# Patient Record
Sex: FEMALE | Race: WHITE | HISPANIC OR LATINO | Employment: FULL TIME | URBAN - METROPOLITAN AREA
[De-identification: names, ages, dates, MRNs, and addresses within clinical notes are randomized per-mention and may not be internally consistent; named-entity substitution may affect disease eponyms.]

---

## 2018-03-03 LAB — EXTERNAL HIV SCREEN: NORMAL

## 2019-11-22 RX ORDER — NORGESTIMATE AND ETHINYL ESTRADIOL 0.25-0.035
KIT ORAL EVERY 24 HOURS
COMMUNITY
Start: 2017-03-24 | End: 2019-11-25

## 2019-11-25 ENCOUNTER — OFFICE VISIT (OUTPATIENT)
Dept: INTERNAL MEDICINE CLINIC | Facility: CLINIC | Age: 44
End: 2019-11-25

## 2019-11-25 VITALS
DIASTOLIC BLOOD PRESSURE: 88 MMHG | OXYGEN SATURATION: 99 % | TEMPERATURE: 98.1 F | BODY MASS INDEX: 26.37 KG/M2 | HEIGHT: 65 IN | HEART RATE: 80 BPM | SYSTOLIC BLOOD PRESSURE: 110 MMHG | WEIGHT: 158.29 LBS

## 2019-11-25 DIAGNOSIS — Z01.419 ROUTINE GYNECOLOGICAL EXAMINATION: ICD-10-CM

## 2019-11-25 DIAGNOSIS — Z13.220 SCREENING, LIPID: ICD-10-CM

## 2019-11-25 DIAGNOSIS — D64.9 ANEMIA, UNSPECIFIED TYPE: ICD-10-CM

## 2019-11-25 DIAGNOSIS — Z00.00 ROUTINE ADULT HEALTH MAINTENANCE: Primary | ICD-10-CM

## 2019-11-25 DIAGNOSIS — M43.6 NECK STIFFNESS: ICD-10-CM

## 2019-11-25 DIAGNOSIS — Z12.39 SCREENING FOR BREAST CANCER: ICD-10-CM

## 2019-11-25 DIAGNOSIS — E66.3 OVERWEIGHT (BMI 25.0-29.9): ICD-10-CM

## 2019-11-25 DIAGNOSIS — Z13.1 SCREENING FOR DIABETES MELLITUS: ICD-10-CM

## 2019-11-25 DIAGNOSIS — Z11.4 SCREENING FOR HIV (HUMAN IMMUNODEFICIENCY VIRUS): ICD-10-CM

## 2019-11-25 PROBLEM — D50.9 IRON DEFICIENCY ANEMIA: Status: ACTIVE | Noted: 2017-03-25

## 2019-11-25 PROCEDURE — 99386 PREV VISIT NEW AGE 40-64: CPT | Performed by: PHYSICIAN ASSISTANT

## 2019-11-25 RX ORDER — CYCLOBENZAPRINE HCL 10 MG
10 TABLET ORAL
Qty: 14 TABLET | Refills: 0 | Status: SHIPPED | OUTPATIENT
Start: 2019-11-25 | End: 2019-12-04

## 2019-11-25 RX ORDER — MELOXICAM 15 MG/1
15 TABLET ORAL DAILY
Qty: 14 TABLET | Refills: 0 | Status: SHIPPED | OUTPATIENT
Start: 2019-11-25 | End: 2020-09-14

## 2019-11-25 NOTE — PATIENT INSTRUCTIONS
Dieta baja en grasa   CUIDADO AMBULATORIO:   Kristen dieta baja en grasa  es un plan alimenticio con un bajo contenido de grasa total, de grasa no saludable y de colesterol  Es posible que deba seguir kristen dieta baja en grasas si tiene dificultad para digerir o absorber las grasas  Puede también que deba seguir robert tipo de dieta si tiene colesterol alto  [place] que consume para bajar fox nivel de colesterol  La fibra soluble es un tipo de fibra que ayuda a bajar el nivel de [place]  Distintos tipos de grasa que contienen los alimentos:   · Limite las grasas no saludables  [place]ra [place] con un alto contenido de colesterol, grasas saturadas y grasas trans puede hacer que fox colesterol suba a un nivel no saludable  El riesgo de tener kristen enfermedad cardíaca aumenta cuando el nivel de colesterol no se encuentra dentro de un margen saludable  ¨ Colesterol:  Limite el consumo de colesterol a menos de 200 mg al día  El colesterol se encuentra en las vasquez, los huevos y productos lácteos  ¨ Grasas saturadas:  Limite el consumo de grasas saturadas a menos del 7% del total de bridgett calorías diarias  Pregunte a fox dietista cuántas calorías necesita por día  Las grasas saturadas se encuentran en la [facility] york, el [facility], el helado, la Judy Vasquez entera y el aceite de cespedes  Las vasquez, harinder la carne de res, de cerdo, la piel de ynes y las vasquez procesadas, también contienen grasas saturadas  Las vasquez procesadas [place], los embutidos y la mortadela  ¨ Grasas trans:  Evite el consumo de grasas trans siempre que pueda  Las grasas trans se usan en los alimentos fritos y de [place]  Pese a que algunos alimentos dicen no tener grasas trans en el paquete, pueden contener hasta 0 5 gramos de grasas trans por porción  · Incluya grasas saludables    Sustituya los [facility] tienen un alto contenido de grasas saturadas y grasas trans por alimentos que tienen un alto contenido de grasas saludables  [place] puede ayudar a [employer] de [facility]  ¨ Grasas monoinsaturadas:  Se encuentran en los [address], las nueces y los [address] [place] [address] Center , harinder el aceite de [place], cancaterina y Dorothy Chaudhry poliinsaturadas: Se pueden encontrar en los [address] [place] [address] , harinder el aceite de soya o de [place]  Las grasas Omega 3 pueden contribuir a disminuir el riesgo de padecer enfermedades cardíacas  Las [place] 3 se [employer] pescados, harinder el salmón, el arenque, la trucha y [place] breeze  Las grasas Omega 3 también se encuentran en los alimentos que provienen de las plantas, harinder las nueces, la linaza, la soya y el aceite de canola o colza  Alimentos que debe limitar o evitar:   · Granos:      ¨ Meriendas elaboradas con aceites parcialmente hidrogenados, harinder papitas fritas, galletas saladas regulares y palomitas de maíz con sabor a mantequilla  ¨ Productos de panadería con un alto contenido de grasa, harinder [facility], [place], donas, tartas, galletas y pasteles      · Productos lácteos:      ¨ Leche entera, leche con 2% de [place], y yogur y helado elaborados con leche entera    ¨ Crema para el café, crema doble y crema batida    ¨ [place], [place] crema y crema agria    · Rebeccaside y proteínas:      ¨ Garcia de carne con un alto contenido de grasa (harinder [place], carne molida regular y costillas)    ¨ Carne de res, de ave (pavo y ynes) o de pescado frita    ¨ Vasquez xin (ynes y pavo) con piel    ¨ Fiambres (vickie o mortadela [facility]), perritos calientes, tocino y salchichas    ¨ Huevos enteros y yemas de [place]    · Verduras y frutas con grasa agregada:      Cory dasilva, con mantequilla o con salsas con un alto contenido de Wendy ramirez, harinder salsas de crema o de queso    ¨ Fruta frita o servida con mantequilla o crema    · Grasas:      sara Weller en [facility] y Kathy    ¨ Aceite de eduardo, de cespedes y de [place] de cespedes  Alimentos que puede incluir: · Granos:      ¨ Panes, cereales y pastas de [place] integral y arroz integral    ¨ Galletas y roscas saladas con bajo contenido de grasa    · Verduras y frutas:      ¨ Verduras frescas, congeladas o enlatadas (sin sal o con bajo contenido de sodio)    ¨ Fruta fresca, seca o enlatada (en almíbar con poca azúcar o en jugo de fruta)    ¨ [place]    · Productos lácteos bajos en grasa:      ¨ Leche descremada (sin gordura) o con un 1% de grasa    ¨ Queso descremado o con un bajo contenido de Wendy ramirez, yogur y requesón    · Rebeccaside y proteínas:      ¨ Carne de ynes o pavo sin piel    ¨ Pescado horneado o asado    ¨ Carne de res o de cerdo [place] (lomo, carne molida extra [place])    ¨ Frijoles y chícharos, nueces sin sal y productos de soya    ¨ Ana de [place] y sustitutos de huevo    ¨ Semillas y nueces    · Grasas:      ¨ Aceites no saturados, harinder de colza o canola, de [place], de [place], soya o girasol    ¨ Margarina blanda o líquida y aceite vegetal para untar    ¨ Aderezo de ensaladas bajo en grasa  Otras formas de disminuir la cantidad de grasa en fox dieta:   · Yolie las etiquetas de los alimentos antes de comprarlos  Elija alimentos con menos de un 30% del total de calorías provenientes de la grasa  Elija productos lácteos con bajo contenido de grasa o descremados  Recuerde que el hecho de que un producto no contenga grasa no quiere decir que no tiene calorías  Estos alimentos tienen calorías y puede aumentar de peso si ingiere muchas calorías  · Recorte la grasa de las gildardo y no consuma alimentos fritos  Recorte toda la grasa visible de las gildardo antes de cocinarlas  Quite la piel de las gildardo de ave  No fría la carne, el [address] aves  Es preferible que cocine al horno, ase o hierva estos alimentos  Evite los alimentos fritos  Consuma kristen papa al horno en lugar de skylar fritas  Coma verduras al vapor en lugar de verduras salteadas en mantequilla  · Agregue menos grasa a los alimentos    Use trocitos de imitación tocino en las ensaladas y en las skylar horneadas, en lugar de tocino de evangelina  Utilice aderezos para ensaladas sin grasa o bajos en grasa en lugar de aderezos comunes  Use un aderezo con sabor a mantequilla con bajo contenido de grasa o sin grasa en lugar de mantequilla o de margarina hollie [address] y otros alimentos  Maneras de usar menos grasa en las recetas de cocina:  Sustituya los ingredientes con un alto contenido de grasa por ingredientes con un bajo contenido de grasa o sin grasa  [place] puede [employer] productos horneados queden más secos que de [place]  Es posible que deba usar aceite en aerosol sin grasa en los moldes para evitar que los alimentos se peguen  Es posible que también deba modificar la cantidad de otros ingredientes de la receta, harinder el [place]  Intente lo siguiente:  · Use margarina liviana o con bajo contenido de grasa en lugar de margarina común o manteca  · Use pechuga de pavo o carne de ynes [place], o carne de res [place] (menos del 5% de grasa), en lugar de carne para hamburguesas  · Añada 1 cucharadita de aceite de canola o colza a 8 onzas de leche descremada en lugar de usar crema  · Use calabacines, zanahorias o manzanas ralladas en los panes en lugar de eduardo     · Use requesón mezclado y bajo en grasa, tofú o queso ricota bajo en grasa en lugar de queso crema  · Use 1 dona de huevo y 1 cucharadita de aceite de canola o use ¼ taza (2 onzas) de sustituto de huevo sin grasa, en lugar de usar un huevo entero  · Al hornear, sustituya la mitad del aceite que requiere la receta por puré de Synchari  Use 3 cucharadas de cocoa en polvo y 1 cucharada de aceite de canola en lugar de un bloquecito de chocolate para hornear  Cómo aumentar la cantidad de [place] en fox dieta:  Consuma suficientes alimentos ricos en fibra con el fin de ingerir entre 20 y 27 gramos de fibra a diario   Para evitar los cólicos estomacales, los gases y [address] [address], aumente gradualmente el consumo de [place]  · Consuma 3 onzas de alimentos integrales al día  Kristen rebanada de pan pesa aproximadamente 1 onza  Coma panes integrales, harinder el pan de kirti integral  El kirti integral, la harina de kirti integral y otros granos integrales deben figurar harinder el primer ingrediente en la etiqueta del producto  Sustituya la harina lorin por [place] integral o use mitad y mitad en bridgett recetas  La harina integral es más pesada que la harina lorin, por lo cual es posible que deba agregar más levadura o polvo de hornear a rankin receta  · Consuma un cereal rico en fibra en el desayuno  La harina de tennille es kristen buena nayla de fibra soluble  Busque los cereales que tengan la palabra salvado o fibra ("bran" o "fiber") en rankin nombre  Elija productos con granos integrales, harinder el arroz integral, la cebada y las pastas elaboradas con [place] integral      · Coma más frijoles, chícharos y lentejas  Agregue, por ejemplo, frijoles a las sopas o ensaladas  Consuma al menos 5 tazas de frutas o verduras a diario  Consuma las frutas y verduras sin pelar, ya que la cáscara tiene un alto contenido de [place]  © 2017 [address] Information is for End User's use only and may not be sold, redistributed or otherwise used for commercial purposes  All illustrations and images included in CareNotes® are the copyrighted property of [employer]  or Derek Light  Esta información es sólo para uso en educación  Rankin intención no es darle un consejo médico sobre enfermedades o tratamientos  Colsulte con rankin [place] farmacéutico antes de seguir cualquier régimen médico para saber si es seguro y efectivo para usted

## 2019-11-25 NOTE — PROGRESS NOTES
Assessment/Plan:      Diagnoses and all orders for this visit:    Routine adult health maintenance    Anemia, unspecified type  -     CBC and differential; Future  -     Iron Panel (Includes Ferritin, Iron Sat%, Iron, and TIBC); Future    Overweight (BMI 25 0-29  9)    Neck stiffness  -     meloxicam (MOBIC) 15 mg tablet; Take 1 tablet (15 mg total) by mouth daily for 14 days  -     cyclobenzaprine (FLEXERIL) 10 mg tablet; Take 1 tablet (10 mg total) by mouth daily at bedtime for 14 days    Screening for breast cancer  -     Mammo screening bilateral w cad; Future    Screening for diabetes mellitus  -     Comprehensive metabolic panel; Future    Screening, lipid  -     Lipid panel; Future    Screening for HIV (human immunodeficiency virus)  -     HIV 1/2 AG-AB combo; Future    Routine gynecological examination  -     Ambulatory referral to Obstetrics / Gynecology; Future      63-year-old female presenting today to establish with our office and for annual physical   Overall she is in good health  Past medical history includes anemia and reports needing IV iron infusions secondary to oral iron GI intolerance  Will check updated CBC and iron panel  She notes that she does not have as heavy periods as she used to which could have contributed to iron deficiency  Only complaint today is some neck stiffness for 2 weeks  Appears muscular in nature  Educated patient on the importance of routine gentle neck stretches at least 15 minutes 3 times a day that were demonstrated in the office in addition to moist heat or heating pad to relax the muscles  I will have patient complete a 2 week treatment of meloxicam with food once a day in the morning, cyclobenzaprine once a day before bed with side effects discussed  Age-appropriate education regarding screenings and prevention were addressed today  Patient has a very poor dietary regimen  She was advised to avoid fast food and high fat foods/fried foods    She was encouraged to eat more fruits and vegetables as well as lean protein and avoiding excessive saturated and trans fats  Also encouraged exercise 3 times a week at least 30 minutes each  Patient was given referral to [facility] gyn as she was due for her next Pap testing in January 2019  She also is overdue for mammogram screening which was provided for her today  She is also interested in other preventative labs so will check CMP, lipids as well as HIV screening  Patient unfortunately declines flu vaccine despite recommendations  Patient to follow up in 1 year for annual physical unless needed sooner regarding test results or ongoing medical issues  Chief Complaint   Patient presents with   [facility]      Patient is here for a physical and routine blood work  Anemia       Subjective:     Patient ID: Laxmi Rm is a 40 y o  female     39y/o female here today for establishment with office and physical  Office  available to establish pt  She reports hx of anemia, otherwise no specific medical diagnosis  She does not take iron  She reports used to get IV iron infusions due to GI intolerance  She is not under tx for any medical problems at present and no medications  States hx of lump right breast and has had testing done  She is overdue for screening mammogram     Last GYN exam 1/2018  Due for PAP 1/2019  LMP: 11/9/19  Periods are once a month, uncomplicated  She does note less heavy bleeding than in past causing anemia  She does not take any oral BC  Currently sexually active monogamous with   She does not use protection  She had tubal ligation  She feels she does not eat healthy, she feels she eats a lot of junk food  She eats 2 meals a day  She eats fast food a lot as well as at restaurants  She drinks a lot of water during the day  No vitamins  ETOH - none  Tobacco - denies past or present  Denies past or present drug use      Last dental exam - 3/2019  Does not require vision correction  Lives in apartment with  and children, feels safe in home  Working smoke alarms  Pt drives, wears seatbelt  She does note some problems with neck pain for 2 weeks  Noted B/L neck into upper back and shoulder  Stiffened and sore  She denies specific injury  States woke up with pain  She works as a   She tried tylenol, no other tx at home  Review of Systems   Constitutional: Negative  HENT: Negative  Eyes: Negative  Respiratory: Negative  Cardiovascular: Negative  Gastrointestinal: Negative  Endocrine: Negative  Genitourinary: Negative  Musculoskeletal:        As in HPI   Skin: Negative  Neurological: Negative  Hematological: Negative  Psychiatric/Behavioral: Negative  The following portions of the patient's history were reviewed and updated as appropriate: allergies, current medications, past family history, past medical history, past social history, past surgical history and problem list       Objective:     Physical Exam   Constitutional: She is oriented to person, place, and time  She appears well-developed  No distress  Mild overweight BMI 26 18   HENT:   Right Ear: Hearing, tympanic membrane and ear canal normal    Left Ear: Hearing, tympanic membrane and ear canal normal    Nose: Nose normal    Mouth/Throat: Oropharynx is clear and moist    Eyes: Conjunctivae, EOM and lids are normal    Neck: Neck supple  Normal carotid pulses present  Carotid bruit is not present  Cardiovascular: Normal rate, regular rhythm, normal heart sounds and normal pulses  No LE edema   Pulmonary/Chest: Effort normal and breath sounds normal    Abdominal: Soft  Normal appearance and bowel sounds are normal  There is no tenderness  Musculoskeletal:   Normal appearance of neck and UE's  Tenderness along B/L cervical paraspinals   Decreased AROM of neck in all directions with neck stiffness, mainly noted difficulty with flexion and extension  Lymphadenopathy:        Head (right side): No submandibular and no tonsillar adenopathy present  Head (left side): No submandibular and no tonsillar adenopathy present  Neurological: She is alert and oriented to person, place, and time  She displays no tremor  No sensory deficit  She exhibits normal muscle tone  Coordination and gait normal    Reflex Scores:       Brachioradialis reflexes are 2+ on the right side and 2+ on the left side  Patellar reflexes are 2+ on the right side and 2+ on the left side  Skin: Skin is intact  Psychiatric: She has a normal mood and affect  Her speech is normal and behavior is normal    Vitals reviewed  Vitals:    11/25/19 1256   BP: 110/88   BP Location: Left arm   Patient Position: Sitting   Cuff Size: Adult   Pulse: 80   Temp: 98 1 °F (36 7 °C)   TempSrc: Oral   SpO2: 99%   Weight: 71 8 kg (158 lb 4 6 oz)   Height: 5' 5 2" (1 656 m)        BMI Counseling: Body mass index is 26 18 kg/m²  The BMI is above normal  Nutrition recommendations include decreasing overall calorie intake, 3-5 servings of fruits/vegetables daily, reducing fast food intake, consuming healthier snacks, moderation in carbohydrate intake, increasing intake of lean protein, reducing intake of saturated fat and trans fat and reducing intake of cholesterol  Exercise recommendations include exercising 3-5 times per week

## 2019-12-02 ENCOUNTER — HOSPITAL ENCOUNTER (EMERGENCY)
Facility: HOSPITAL | Age: 44
Discharge: HOME | End: 2019-12-02
Attending: EMERGENCY MEDICINE | Admitting: EMERGENCY MEDICINE

## 2019-12-02 VITALS
DIASTOLIC BLOOD PRESSURE: 65 MMHG | OXYGEN SATURATION: 100 % | HEART RATE: 79 BPM | BODY MASS INDEX: 24.66 KG/M2 | TEMPERATURE: 99.3 F | RESPIRATION RATE: 18 BRPM | HEIGHT: 65 IN | SYSTOLIC BLOOD PRESSURE: 121 MMHG | WEIGHT: 148 LBS

## 2019-12-02 DIAGNOSIS — R19.7 NAUSEA VOMITING AND DIARRHEA: Primary | ICD-10-CM

## 2019-12-02 DIAGNOSIS — R11.2 NAUSEA VOMITING AND DIARRHEA: Primary | ICD-10-CM

## 2019-12-02 DIAGNOSIS — D64.9 CHRONIC ANEMIA: ICD-10-CM

## 2019-12-02 DIAGNOSIS — E86.0 DEHYDRATION: ICD-10-CM

## 2019-12-02 LAB
ALBUMIN SERPL BCP-MCNC: 4.2 G/DL (ref 3.5–5)
ALP SERPL-CCNC: 63 U/L (ref 46–116)
ALT SERPL W P-5'-P-CCNC: 17 U/L (ref 12–78)
ANION GAP SERPL CALCULATED.3IONS-SCNC: 9 MMOL/L (ref 4–13)
AST SERPL W P-5'-P-CCNC: 13 U/L (ref 5–45)
BASOPHILS # BLD AUTO: 0.01 THOUSANDS/ΜL (ref 0–0.1)
BASOPHILS NFR BLD AUTO: 0 % (ref 0–1)
BILIRUB SERPL-MCNC: 0.8 MG/DL (ref 0.2–1)
BUN SERPL-MCNC: 7 MG/DL (ref 5–25)
CALCIUM SERPL-MCNC: 9.2 MG/DL (ref 8.3–10.1)
CHLORIDE SERPL-SCNC: 107 MMOL/L (ref 100–108)
CO2 SERPL-SCNC: 23 MMOL/L (ref 21–32)
CREAT SERPL-MCNC: 0.75 MG/DL (ref 0.6–1.3)
EOSINOPHIL # BLD AUTO: 0.01 THOUSAND/ΜL (ref 0–0.61)
EOSINOPHIL NFR BLD AUTO: 0 % (ref 0–6)
ERYTHROCYTE [DISTWIDTH] IN BLOOD BY AUTOMATED COUNT: 20.3 % (ref 11.6–15.1)
GFR SERPL CREATININE-BSD FRML MDRD: 97 ML/MIN/1.73SQ M
GLUCOSE SERPL-MCNC: 119 MG/DL (ref 65–140)
HCT VFR BLD AUTO: 29 % (ref 34.8–46.1)
HGB BLD-MCNC: 7.8 G/DL (ref 11.5–15.4)
HOLD SPECIMEN: NORMAL
IMM GRANULOCYTES # BLD AUTO: 0.02 THOUSAND/UL (ref 0–0.2)
IMM GRANULOCYTES NFR BLD AUTO: 0 % (ref 0–2)
LIPASE SERPL-CCNC: 93 U/L (ref 73–393)
LYMPHOCYTES # BLD AUTO: 0.65 THOUSANDS/ΜL (ref 0.6–4.47)
LYMPHOCYTES NFR BLD AUTO: 11 % (ref 14–44)
MCH RBC QN AUTO: 17 PG (ref 26.8–34.3)
MCHC RBC AUTO-ENTMCNC: 26.9 G/DL (ref 31.4–37.4)
MCV RBC AUTO: 63 FL (ref 82–98)
MONOCYTES # BLD AUTO: 0.61 THOUSAND/ΜL (ref 0.17–1.22)
MONOCYTES NFR BLD AUTO: 11 % (ref 4–12)
NEUTROPHILS # BLD AUTO: 4.53 THOUSANDS/ΜL (ref 1.85–7.62)
NEUTS SEG NFR BLD AUTO: 78 % (ref 43–75)
NRBC BLD AUTO-RTO: 0 /100 WBCS
PLATELET # BLD AUTO: 209 THOUSANDS/UL (ref 149–390)
PMV BLD AUTO: 9.8 FL (ref 8.9–12.7)
POTASSIUM SERPL-SCNC: 3.4 MMOL/L (ref 3.5–5.3)
PROT SERPL-MCNC: 8.2 G/DL (ref 6.4–8.2)
RBC # BLD AUTO: 4.6 MILLION/UL (ref 3.81–5.12)
SODIUM SERPL-SCNC: 139 MMOL/L (ref 136–145)
WBC # BLD AUTO: 5.83 THOUSAND/UL (ref 4.31–10.16)

## 2019-12-02 PROCEDURE — 85025 COMPLETE CBC W/AUTO DIFF WBC: CPT | Performed by: EMERGENCY MEDICINE

## 2019-12-02 PROCEDURE — 99283 EMERGENCY DEPT VISIT LOW MDM: CPT

## 2019-12-02 PROCEDURE — 96361 HYDRATE IV INFUSION ADD-ON: CPT

## 2019-12-02 PROCEDURE — 36415 COLL VENOUS BLD VENIPUNCTURE: CPT

## 2019-12-02 PROCEDURE — 99284 EMERGENCY DEPT VISIT MOD MDM: CPT | Performed by: EMERGENCY MEDICINE

## 2019-12-02 PROCEDURE — 80053 COMPREHEN METABOLIC PANEL: CPT | Performed by: EMERGENCY MEDICINE

## 2019-12-02 PROCEDURE — 83690 ASSAY OF LIPASE: CPT | Performed by: EMERGENCY MEDICINE

## 2019-12-02 PROCEDURE — 96375 TX/PRO/DX INJ NEW DRUG ADDON: CPT

## 2019-12-02 PROCEDURE — 96374 THER/PROPH/DIAG INJ IV PUSH: CPT

## 2019-12-02 RX ORDER — DICYCLOMINE HCL 20 MG
20 TABLET ORAL 2 TIMES DAILY
Qty: 20 TABLET | Refills: 0 | Status: SHIPPED | OUTPATIENT
Start: 2019-12-02 | End: 2020-09-14

## 2019-12-02 RX ORDER — METOCLOPRAMIDE 10 MG/1
10 TABLET ORAL EVERY 6 HOURS PRN
Qty: 30 TABLET | Refills: 0 | Status: SHIPPED | OUTPATIENT
Start: 2019-12-02 | End: 2020-09-14

## 2019-12-02 RX ORDER — METOCLOPRAMIDE HYDROCHLORIDE 5 MG/ML
10 INJECTION INTRAMUSCULAR; INTRAVENOUS ONCE
Status: COMPLETED | OUTPATIENT
Start: 2019-12-02 | End: 2019-12-02

## 2019-12-02 RX ORDER — KETOROLAC TROMETHAMINE 30 MG/ML
15 INJECTION, SOLUTION INTRAMUSCULAR; INTRAVENOUS ONCE
Status: COMPLETED | OUTPATIENT
Start: 2019-12-02 | End: 2019-12-02

## 2019-12-02 RX ORDER — DICYCLOMINE HCL 20 MG
20 TABLET ORAL ONCE
Status: COMPLETED | OUTPATIENT
Start: 2019-12-02 | End: 2019-12-02

## 2019-12-02 RX ADMIN — KETOROLAC TROMETHAMINE 15 MG: 30 INJECTION, SOLUTION INTRAMUSCULAR at 17:11

## 2019-12-02 RX ADMIN — METOCLOPRAMIDE 10 MG: 5 INJECTION, SOLUTION INTRAMUSCULAR; INTRAVENOUS at 17:11

## 2019-12-02 RX ADMIN — DICYCLOMINE HYDROCHLORIDE 20 MG: 20 TABLET ORAL at 17:11

## 2019-12-02 RX ADMIN — SODIUM CHLORIDE 1000 ML: 0.9 INJECTION, SOLUTION INTRAVENOUS at 16:15

## 2019-12-02 NOTE — ED ATTENDING ATTESTATION
Shane Laboy MD, saw and evaluated the patient  All available labs and X-rays were ordered by me or the resident and have been reviewed by myself  I discussed the patient with the resident / non-physician and agree with the resident's / non-physician practitioner's findings and plan as documented in the resident's / non-physician practicitioner's note, except where noted  At this point, I agree with the current assessment done in the ED  I was present during key portions of all procedures performed unless otherwise stated  Chief Complaint   Patient presents with    Fever - 9 weeks to 74 years     Pt reports fever and vomitting since Saturday  States she has not been taking temps at home but feels "hot"  Pt reports takign motrin last dose at 8pm, no tylenol   Vomiting     Pt reports vomitting "too many times" since Saturday  Reports umbilical abdominal pain 7/10 "achy" in nature  States she has been able to keep water down but nothing else  This is a 41 y/o F presenting for epigastric pain, n/v/d  For the past few days, since Saturday, she has been feeling subjectively warm, pain in the upper belly that is non-radiating  Unrelated to food intake, just continuously hurting  No one else has similar  No dizziness currently but felt if she got up quickly, more dizzy than she'd normally get  Denies any urinary tract infection symptoms (burning, itching, pain, blood, frequency)  Denies any upper respiratory tract infection symptoms (cough, congestion, rhinorrhea, sore throat)  No recent abx  Diarrhea is non-bloody, non-melanotic  Vomit is multiple times, NBNB as well     No large amount of NSAIDs  PMH:  - Iron deficiency anemia from heavy menses  PSH:  - tubal ligation  No smoking drinking drugs  No recent travel outside of country  No recent ABX  PE:  Vitals:    12/02/19 1606 12/02/19 1754   BP: 131/63 121/65   BP Location: Right arm Right arm   Pulse: 101 79   Resp: 17 18   Temp: 99 3 °F (37 4 °C)    TempSrc: Oral    SpO2: 99% 100%   Weight: 67 1 kg (148 lb)    Height: 5' 5" (1 651 m)    General: VSS, NAD, awake, alert  Well-nourished, well-developed  Appears stated age  Speaking normally in full sentences  Head: Normocephalic, atraumatic, nontender  Eyes: PERRL, EOM-I  No diplopia  No hyphema  No subconjunctival hemorrhages  Symmetrical lids  ENT: Atraumatic external nose and ears  Dry MM  No malocclusion  No stridor  Normal phonation  No drooling  Normal swallowing  Neck: Symmetric, trachea midline  No JVD  CV: RRR  +S1/S2  No murmurs or gallops  Peripheral pulses +2 throughout  No chest wall tenderness  Lungs:   Unlabored No retractions  CTAB, lungs sounds equal bilateral    No tachypnea  Abd: +BS, soft, upper abd tenderness  ND  No heel strike sign  No cvat  No distention  MSK:   FROM   Back:   No rashes  Skin: Dry, intact  Neuro: AAOx3, GCS 15, CN II-XII grossly intact  Motor grossly intact  Psychiatric/Behavioral: Appropriate mood and affect   Exam: deferred  A:  - GE vs viral syndrome  P:  - anti-emetics, IVF, re-evaluate  - 13 point ROS was performed and all are normal unless stated in the history above  - Nursing note reviewed  Vitals reviewed  - Orders placed by myself and/or advanced practitioner / resident    - Previous chart was reviewed  - No language barrier    - History obtained from patient  - There are no limitations to the history obtained  - Critical care time: Not applicable for this patient  Code Status: No Order  Advance Directive and Living Will:      Power of :    POLST:      Final Diagnosis:  1  Nausea vomiting and diarrhea    2  Mild, dehydration    3   Chronic microcytic anemia        ED Course as of Dec 03 1312   Mon Dec 02, 2019   1641 WBC: 5 83   1641 Hemoglobin(!): 7 8   1641 MCV(!): 63   1641 Platelet Count: 827     Medications   sodium chloride 0 9 % bolus 1,000 mL (0 mL Intravenous Stopped 12/2/19 8886) dicyclomine (BENTYL) tablet 20 mg (20 mg Oral Given 12/2/19 1711)   ketorolac (TORADOL) injection 15 mg (15 mg Intravenous Given 12/2/19 1711)   metoclopramide (REGLAN) injection 10 mg (10 mg Intravenous Given 12/2/19 1711)     No orders to display     Orders Placed This Encounter   Procedures    CBC and differential    Comprehensive metabolic panel    Lipase     Labs Reviewed   CBC AND DIFFERENTIAL - Abnormal       Result Value Ref Range Status    WBC 5 83  4 31 - 10 16 Thousand/uL Final    RBC 4 60  3 81 - 5 12 Million/uL Final    Hemoglobin 7 8 (*) 11 5 - 15 4 g/dL Final    Hematocrit 29 0 (*) 34 8 - 46 1 % Final    MCV 63 (*) 82 - 98 fL Final    MCH 17 0 (*) 26 8 - 34 3 pg Final    MCHC 26 9 (*) 31 4 - 37 4 g/dL Final    RDW 20 3 (*) 11 6 - 15 1 % Final    MPV 9 8  8 9 - 12 7 fL Final    Platelets 119  273 - 390 Thousands/uL Final    nRBC 0  /100 WBCs Final    Neutrophils Relative 78 (*) 43 - 75 % Final    Immat GRANS % 0  0 - 2 % Final    Lymphocytes Relative 11 (*) 14 - 44 % Final    Monocytes Relative 11  4 - 12 % Final    Eosinophils Relative 0  0 - 6 % Final    Basophils Relative 0  0 - 1 % Final    Neutrophils Absolute 4 53  1 85 - 7 62 Thousands/µL Final    Immature Grans Absolute 0 02  0 00 - 0 20 Thousand/uL Final    Lymphocytes Absolute 0 65  0 60 - 4 47 Thousands/µL Final    Monocytes Absolute 0 61  0 17 - 1 22 Thousand/µL Final    Eosinophils Absolute 0 01  0 00 - 0 61 Thousand/µL Final    Basophils Absolute 0 01  0 00 - 0 10 Thousands/µL Final   COMPREHENSIVE METABOLIC PANEL - Abnormal    Sodium 139  136 - 145 mmol/L Final    Potassium 3 4 (*) 3 5 - 5 3 mmol/L Final    Chloride 107  100 - 108 mmol/L Final    CO2 23  21 - 32 mmol/L Final    ANION GAP 9  4 - 13 mmol/L Final    BUN 7  5 - 25 mg/dL Final    Creatinine 0 75  0 60 - 1 30 mg/dL Final    Comment: Standardized to IDMS reference method    Glucose 119  65 - 140 mg/dL Final    Comment:   If the patient is fasting, the ADA then defines impaired fasting glucose as > 100 mg/dL and diabetes as > or equal to 123 mg/dL  Specimen collection should occur prior to Sulfasalazine administration due to the potential for falsely depressed results  Specimen collection should occur prior to Sulfapyridine administration due to the potential for falsely elevated results  Calcium 9 2  8 3 - 10 1 mg/dL Final    AST 13  5 - 45 U/L Final    Comment:   Specimen collection should occur prior to Sulfasalazine administration due to the potential for falsely depressed results  ALT 17  12 - 78 U/L Final    Comment:   Specimen collection should occur prior to Sulfasalazine and/or Sulfapyridine administration due to the potential for falsely depressed results       Alkaline Phosphatase 63  46 - 116 U/L Final    Total Protein 8 2  6 4 - 8 2 g/dL Final    Albumin 4 2  3 5 - 5 0 g/dL Final    Total Bilirubin 0 80  0 20 - 1 00 mg/dL Final    eGFR 97  ml/min/1 73sq m Final    Narrative:     [place] guidelines for Chronic Kidney Disease (CKD):     Stage 1 with normal or high GFR (GFR > 90 mL/min/1 73 square meters)    Stage 2 Mild CKD (GFR = 60-89 mL/min/1 73 square meters)    Stage 3A Moderate CKD (GFR = 45-59 mL/min/1 73 square meters)    Stage 3B Moderate CKD (GFR = 30-44 mL/min/1 73 square meters)    Stage 4 Severe CKD (GFR = 15-29 mL/min/1 73 square meters)    Stage 5 End Stage CKD (GFR <15 mL/min/1 73 square meters)  Note: GFR calculation is accurate only with a steady state creatinine   LIPASE - Normal    Lipase 93  73 - 393 u/L Final     Time reflects when diagnosis was documented in both MDM as applicable and the Disposition within this note     Time User Action Codes Description Comment    12/2/2019  4:45 PM Oralia Bussing Add [R11 2,  R19 7] Nausea vomiting and diarrhea     12/2/2019  4:45 PM Oralia Bussing Add [E86 0] Dehydration     12/2/2019  4:45 PM Oralia Bussing Modify [E86 0] Mild, dehydration     12/2/2019 5:14 PM Rocio Anaya Add [D64 9] Chronic anemia     12/2/2019  5:14 PM Rocio Leisure Modify [D64 9] Chronic microcytic anemia       ED Disposition     ED Disposition Condition Date/Time Comment    Discharge Good Mon Dec 2, 2019  5:47 PM Gerardo Dayin discharge to home/self care  Follow-up Information     Follow up With Specialties Details Why Contact Info Additional [facility], [facility] Internal Medicine, Physician Assistant Call in 1 day Please call your PCP tomorrow for follow-up regarding anemia and other medical problems  [facility] Emergency Medicine Go to  If symptoms worsen  [address]  [phone]  ED, [address] [place] [address], [address]   [phone]        Discharge Medication List as of 12/2/2019  5:49 PM      START taking these medications    Details   dicyclomine (BENTYL) 20 mg tablet Take 1 tablet (20 mg total) by mouth 2 (two) times a day, Starting Mon 12/2/2019, Print      metoclopramide (REGLAN) 10 mg tablet Take 1 tablet (10 mg total) by mouth every 6 (six) hours as needed (Nausea and vomiting), Starting Mon 12/2/2019, Print         CONTINUE these medications which have NOT CHANGED    Details   cyclobenzaprine (FLEXERIL) 10 mg tablet Take 1 tablet (10 mg total) by mouth daily at bedtime for 14 days, Starting Mon 11/25/2019, Until Mon 12/9/2019, Normal      meloxicam (MOBIC) 15 mg tablet Take 1 tablet (15 mg total) by mouth daily for 14 days, Starting Mon 11/25/2019, Until Mon 12/9/2019, Normal           No discharge procedures on file  Prior to Admission Medications   Prescriptions Last Dose Informant Patient Reported? Taking?    cyclobenzaprine (FLEXERIL) 10 mg tablet Not Taking at Unknown time  No No   Sig: Take 1 tablet (10 mg total) by mouth daily at bedtime for 14 days   Patient not taking: Reported on 12/2/2019   meloxicam (MOBIC) 15 mg tablet Not Taking at Unknown time  No No   Sig: Take 1 tablet (15 mg total) by mouth daily for 14 days   Patient not taking: Reported on 12/2/2019      Facility-Administered Medications: None       Portions of the record may have been created with voice recognition software  Occasional wrong word or "sound a like" substitutions may have occurred due to the inherent limitations of voice recognition software  Read the chart carefully and recognize, using context, where substitutions have occurred      Electronically signed by:  Lennox Kearns

## 2019-12-02 NOTE — ED PROVIDER NOTES
History  Chief Complaint   Patient presents with    Fever - 9 weeks to 74 years     Pt reports fever and vomitting since Saturday  States she has not been taking temps at home but feels "hot"  Pt reports takign motrin last dose at 8pm, no tylenol   Vomiting     Pt reports vomitting "too many times" since Saturday  Reports umbilical abdominal pain 7/10 "achy" in nature  States she has been able to keep water down but nothing else  54-year-old female with past medical history of anemia requiring IV iron per review of records who is presenting with subjective fever, nausea, vomiting, upper abdominal pain, and diarrhea  Patient states that her symptoms began on Saturday, 2 days prior to presentation  Prior to that, the patient had been in her usual state of health  Patient states that she first felt subjective fever on Saturday  She did not take her temperature at home as she does not have a thermometer  Patient has been taking ibuprofen for the fever  Her last dose was at approximately 2000 yesterday  Patient also had upper abdominal/periumbilical pain which started yesterday as well  Patient states this pain is intermittent, coming and going every several hours without any clear precipitating factor  Patient states that starting today she has had multiple episodes of nonbloody, nonbilious vomiting  She is unable to estimate the number number of episodes of vomiting but thinks that it is at least over 5  Patient reports persistent nausea  She has been able to keep down fluids but nothing else  Patient reports associated nonbloody diarrhea with too numerous to count episodes  Patient last had vomiting and diarrhea at her home prior to arrival to the ER  Patient reports postural dizziness, particularly when she goes from a supine to sitting or sitting to standing position  No vertigo    Patient otherwise denies any shaking chills, chest pain, shortness of breath, dysuria, urinary frequency, and hematuria  Patient had her last menstrual period on November 9th and has no recent missed menstrual periods  Patient has a history of tubal ligation but no other abdominal surgeries  No other complaints on review of systems  No known sick contacts  No foreign travel recently  No recent antibiotics  As above, patient reports a history of chronic iron deficiency anemia  She has required IV iron in the past   Per review of records, this is likely secondary to heavy menstrual periods  Prior to Admission Medications   Prescriptions Last Dose Informant Patient Reported? Taking? cyclobenzaprine (FLEXERIL) 10 mg tablet Not Taking at Unknown time  No No   Sig: Take 1 tablet (10 mg total) by mouth daily at bedtime for 14 days   Patient not taking: Reported on 12/2/2019   meloxicam (MOBIC) 15 mg tablet Not Taking at Unknown time  No No   Sig: Take 1 tablet (15 mg total) by mouth daily for 14 days   Patient not taking: Reported on 12/2/2019      Facility-Administered Medications: None       Past Medical History:   Diagnosis Date    Anemia        Past Surgical History:   Procedure Laterality Date    TUBAL LIGATION         Family History   Problem Relation Age of Onset    Hypertension Mother     Stroke Mother     No Known Problems Father     Hypertension Maternal Grandmother     Stroke Maternal Grandmother     Hypertension Maternal Uncle     Stroke Maternal Uncle     Stroke Paternal Aunt      I have reviewed and agree with the history as documented  Social History     Tobacco Use    Smoking status: Never Smoker    Smokeless tobacco: Never Used   Substance Use Topics    Alcohol use: Never     Frequency: Never    Drug use: Never        Review of Systems   Constitutional: Positive for fever  Negative for diaphoresis and unexpected weight change  HENT: Negative for congestion, rhinorrhea and sore throat  Eyes: Negative for pain, discharge and visual disturbance     Respiratory: Negative for cough, shortness of breath and wheezing  Cardiovascular: Negative for chest pain, palpitations and leg swelling  Gastrointestinal: Positive for abdominal pain, diarrhea, nausea and vomiting  Negative for blood in stool and constipation  Genitourinary: Negative for dysuria, flank pain and hematuria  Musculoskeletal: Negative for arthralgias and joint swelling  Skin: Negative for rash and wound  Allergic/Immunologic: Negative for environmental allergies and food allergies  Neurological: Negative for dizziness, seizures, weakness and numbness  Hematological: Negative for adenopathy  Psychiatric/Behavioral: Negative for confusion and hallucinations  Physical Exam  ED Triage Vitals [12/02/19 1606]   Temperature Pulse Respirations Blood Pressure SpO2   99 3 °F (37 4 °C) 101 17 131/63 99 %      Temp Source Heart Rate Source Patient Position - Orthostatic VS BP Location FiO2 (%)   Oral Monitor Lying Right arm --      Pain Score       7             Orthostatic Vital Signs  Vitals:    12/02/19 1606 12/02/19 1754   BP: 131/63 121/65   Pulse: 101 79   Patient Position - Orthostatic VS: Lying Lying       Physical Exam   Constitutional: She is oriented to person, place, and time  She appears well-developed and well-nourished  No distress  HENT:   Head: Normocephalic and atraumatic  Right Ear: External ear normal    Left Ear: External ear normal    Eyes: Pupils are equal, round, and reactive to light  Conjunctivae and EOM are normal    Neck: Normal range of motion  Neck supple  Cardiovascular: Normal rate, regular rhythm and normal heart sounds  No murmur heard  Pulmonary/Chest: Effort normal and breath sounds normal  No respiratory distress  She has no wheezes  She has no rales  Abdominal: Soft  Bowel sounds are normal  She exhibits no distension  There is tenderness  There is no guarding  There is tenderness to palpation of the upper abdomen, just superior to the umbilicus    No guarding or peritoneal signs  No right upper quadrant tenderness  Negative Thayer sign  No lower abdominal tenderness  Musculoskeletal: Normal range of motion  She exhibits no deformity  Neurological: She is alert and oriented to person, place, and time  No gross motor deficits noted  Cranial nerves II-XII are intact  Speech is fluent without dysarthria or aphasia  Skin: Skin is warm and dry  Capillary refill takes less than 2 seconds  Psychiatric: She has a normal mood and affect  Her behavior is normal    Nursing note and vitals reviewed        ED Medications  Medications   sodium chloride 0 9 % bolus 1,000 mL (0 mL Intravenous Stopped 12/2/19 1736)   dicyclomine (BENTYL) tablet 20 mg (20 mg Oral Given 12/2/19 1711)   ketorolac (TORADOL) injection 15 mg (15 mg Intravenous Given 12/2/19 1711)   metoclopramide (REGLAN) injection 10 mg (10 mg Intravenous Given 12/2/19 1711)       Diagnostic Studies  Results Reviewed     Procedure Component Value Units Date/Time    Comprehensive metabolic panel [[id number]]  (Abnormal) Collected:  12/02/19 1616    Lab Status:  Final result Specimen:  Blood from Arm, Right Updated:  12/02/19 1646     Sodium 139 mmol/L      Potassium 3 4 mmol/L      Chloride 107 mmol/L      CO2 23 mmol/L      ANION GAP 9 mmol/L      BUN 7 mg/dL      Creatinine 0 75 mg/dL      Glucose 119 mg/dL      Calcium 9 2 mg/dL      AST 13 U/L      ALT 17 U/L      Alkaline Phosphatase 63 U/L      Total Protein 8 2 g/dL      Albumin 4 2 g/dL      Total Bilirubin 0 80 mg/dL      eGFR 97 ml/min/1 73sq m     Narrative:       [place] guidelines for Chronic Kidney Disease (CKD):     Stage 1 with normal or high GFR (GFR > 90 mL/min/1 73 square meters)    Stage 2 Mild CKD (GFR = 60-89 mL/min/1 73 square meters)    Stage 3A Moderate CKD (GFR = 45-59 mL/min/1 73 square meters)    Stage 3B Moderate CKD (GFR = 30-44 mL/min/1 73 square meters)    Stage 4 Severe CKD (GFR = 15-29 mL/min/1 73 square meters)    Stage 5 End Stage CKD (GFR <15 mL/min/1 73 square meters)  Note: GFR calculation is accurate only with a steady state creatinine    Lipase [[id number]]  (Normal) Collected:  12/02/19 1616    Lab Status:  Final result Specimen:  Blood from Arm, Right Updated:  12/02/19 1646     Lipase 93 u/L     CBC and differential [[id number]]  (Abnormal) Collected:  12/02/19 1616    Lab Status:  Final result Specimen:  Blood from Arm, Right Updated:  12/02/19 1625     WBC 5 83 Thousand/uL      RBC 4 60 Million/uL      Hemoglobin 7 8 g/dL      Hematocrit 29 0 %      MCV 63 fL      MCH 17 0 pg      MCHC 26 9 g/dL      RDW 20 3 %      MPV 9 8 fL      Platelets 255 Thousands/uL      nRBC 0 /100 WBCs      Neutrophils Relative 78 %      Immat GRANS % 0 %      Lymphocytes Relative 11 %      Monocytes Relative 11 %      Eosinophils Relative 0 %      Basophils Relative 0 %      Neutrophils Absolute 4 53 Thousands/µL      Immature Grans Absolute 0 02 Thousand/uL      Lymphocytes Absolute 0 65 Thousands/µL      Monocytes Absolute 0 61 Thousand/µL      Eosinophils Absolute 0 01 Thousand/µL      Basophils Absolute 0 01 Thousands/µL                  No orders to display         Procedures  Procedures      ED Course  ED Course as of Dec 02 2235   Mon Dec 02, 2019   1613 Pulse: 101   1630 Hemoglobin(!): 7 8   1630 MCV(!): 63                               MDM  Number of Diagnoses or Management Options  Chronic microcytic anemia: established and worsening  Mild, dehydration: new and requires workup  Nausea vomiting and diarrhea: new and requires workup  Diagnosis management comments:     Patient presented with nausea, vomiting, diarrhea, and abdominal pain as described above  Initial vital signs were significant for slight tachycardia  On physical examination, the patient was well appearing  She had diffuse abdominal tenderness to palpation without guarding or peritoneal signs    Overall, history and physical examination were suggestive of viral gastroenteritis as the etiology for the patient's nausea, vomiting, and diarrhea  Labs were ordered by triage nurse  CBC demonstrated microcytic anemia with a hemoglobin of 7 8 and MCV of 63  Most recent CBC from [facility] on March 3, 2018 demonstrated a hemoglobin of 10 5 and MCV of 68  Patient had slight hypokalemia but CMP was otherwise unremarkable  Patient was treated symptomatically with IV fluids, IV Reglan, IV Toradol, and Bentyl with significant improvement of her symptoms  Patient was discharged with return precautions and outpatient follow-up  She was provided with prescriptions for Reglan and Bentyl for symptomatic relief  Patient verbalized understanding of the likely diagnosis, plan for treatment, need for follow-up, and return precautions         Amount and/or Complexity of Data Reviewed  Clinical lab tests: ordered and reviewed  Decide to obtain previous medical records or to obtain history from someone other than the patient: yes  Review and summarize past medical records: yes    Risk of Complications, Morbidity, and/or Mortality  Presenting problems: moderate  Diagnostic procedures: minimal  Management options: minimal    Patient Progress  Patient progress: improved        Disposition  Final diagnoses:   Nausea vomiting and diarrhea   Mild, dehydration   Chronic microcytic anemia     Time reflects when diagnosis was documented in both MDM as applicable and the Disposition within this note     Time User Action Codes Description Comment    12/2/2019  4:45 PM Nakita Boucher [R11 2,  R19 7] Nausea vomiting and diarrhea     12/2/2019  4:45 PM Michaele Bears Add [E86 0] Dehydration     12/2/2019  4:45 PM Nurys Pittman Modify [E86 0] Mild, dehydration     12/2/2019  5:14 PM Edythe Bears Add [D64 9] Chronic anemia     12/2/2019  5:14 PM Nurys Pittman Modify [D64 9] Chronic microcytic anemia       ED Disposition     ED Disposition Condition Date/Time Comment    Discharge Good Mon Dec 2, 2019  5:47 PM Joya Sevilla discharge to home/self care  Follow-up Information     Follow up With Specialties Details Why Contact Info Additional [address], PA-C Internal Medicine, Physician Assistant Call in 1 day Please call your PCP tomorrow for follow-up regarding anemia and other medical problems  [facility] Emergency Medicine Go to  If symptoms worsen  [address]  [phone]  ED, [address] [place] [address], [facility], [address]   [phone]          Discharge Medication List as of 12/2/2019  5:49 PM      START taking these medications    Details   dicyclomine (BENTYL) 20 mg tablet Take 1 tablet (20 mg total) by mouth 2 (two) times a day, Starting Mon 12/2/2019, Print      metoclopramide (REGLAN) 10 mg tablet Take 1 tablet (10 mg total) by mouth every 6 (six) hours as needed (Nausea and vomiting), Starting Mon 12/2/2019, Print         CONTINUE these medications which have NOT CHANGED    Details   cyclobenzaprine (FLEXERIL) 10 mg tablet Take 1 tablet (10 mg total) by mouth daily at bedtime for 14 days, Starting Mon 11/25/2019, Until Mon 12/9/2019, Normal      meloxicam (MOBIC) 15 mg tablet Take 1 tablet (15 mg total) by mouth daily for 14 days, Starting Mon 11/25/2019, Until Mon 12/9/2019, Normal           No discharge procedures on file  ED Provider  Attending physically available and evaluated Joya Sevilla  I managed the patient along with the ED Attending      Electronically Signed by         Nathalie Reid MD  12/02/19 2520

## 2019-12-02 NOTE — DISCHARGE INSTRUCTIONS
Take Tylenol and ibuprofen as directed for abdominal pain  Follow package directions for dose  Take Bentyl as prescribed for abdominal pain  Take Reglan as prescribed for nausea and vomiting  Stay well hydrated  Avoid greasy, spicy, heavy, or sugary foods  Follow up with her PCP regarding anemia  Return to the ER with severe or changing abdominal pain  Return with intractable nausea and vomiting  Return with any other concerning symptoms

## 2019-12-03 ENCOUNTER — TELEPHONE (OUTPATIENT)
Dept: INTERNAL MEDICINE CLINIC | Facility: CLINIC | Age: 44
End: 2019-12-03

## 2019-12-03 DIAGNOSIS — D50.9 IRON DEFICIENCY ANEMIA, UNSPECIFIED IRON DEFICIENCY ANEMIA TYPE: Primary | ICD-10-CM

## 2019-12-03 NOTE — TELEPHONE ENCOUNTER
Please call pt - I reviewed her ED report from when she was seen in ED 11/25  Her hemoglobin is low at 7 8, and it appears she may have iron deficiency anemia like she had in the past     I would like her to at least start an OTC iron supplement called slow Fe 160mg and I want her to take it one pill every other day  I ordered labs from her appt with me prior to ED visit  I would like her to get these done maybe in 2 weeks, because there is more specific testing to confirm iron deficiency, more than what was done at ED  Labs are fasting  Also with how low her hemoglobin is, I would advise seeing hematology, in case she may need iron infusions, if oral iron is not enough  I have order in, please give pt [facility] hemat[facility] number to call and schedule

## 2019-12-04 ENCOUNTER — OFFICE VISIT (OUTPATIENT)
Dept: OBGYN CLINIC | Facility: CLINIC | Age: 44
End: 2019-12-04

## 2019-12-04 VITALS
HEIGHT: 65 IN | BODY MASS INDEX: 25.83 KG/M2 | SYSTOLIC BLOOD PRESSURE: 126 MMHG | DIASTOLIC BLOOD PRESSURE: 78 MMHG | HEART RATE: 77 BPM | WEIGHT: 155 LBS

## 2019-12-04 DIAGNOSIS — D50.0 IRON DEFICIENCY ANEMIA DUE TO CHRONIC BLOOD LOSS: ICD-10-CM

## 2019-12-04 DIAGNOSIS — N84.1 CERVICAL POLYP: ICD-10-CM

## 2019-12-04 DIAGNOSIS — Z01.419 WOMEN'S ANNUAL ROUTINE GYNECOLOGICAL EXAMINATION: Primary | ICD-10-CM

## 2019-12-04 DIAGNOSIS — N92.0 MENORRHAGIA WITH REGULAR CYCLE: ICD-10-CM

## 2019-12-04 DIAGNOSIS — Z01.419 ROUTINE GYNECOLOGICAL EXAMINATION: ICD-10-CM

## 2019-12-04 PROCEDURE — G0145 SCR C/V CYTO,THINLAYER,RESCR: HCPCS | Performed by: NURSE PRACTITIONER

## 2019-12-04 PROCEDURE — 88305 TISSUE EXAM BY PATHOLOGIST: CPT | Performed by: PATHOLOGY

## 2019-12-04 PROCEDURE — 87624 HPV HI-RISK TYP POOLED RSLT: CPT | Performed by: NURSE PRACTITIONER

## 2019-12-04 PROCEDURE — 99386 PREV VISIT NEW AGE 40-64: CPT | Performed by: NURSE PRACTITIONER

## 2019-12-04 RX ORDER — FERROUS SULFATE TAB EC 324 MG (65 MG FE EQUIVALENT) 324 (65 FE) MG
324 TABLET DELAYED RESPONSE ORAL EVERY OTHER DAY
Qty: 45 TABLET | Refills: 3 | Status: SHIPPED | OUTPATIENT
Start: 2019-12-04 | End: 2019-12-14 | Stop reason: SDUPTHER

## 2019-12-04 RX ORDER — LEVONORGESTREL / ETHINYL ESTRADIOL AND ETHINYL ESTRADIOL 150-30(84)
1 KIT ORAL DAILY
Qty: 91 EACH | Refills: 3 | Status: SHIPPED | OUTPATIENT
Start: 2019-12-04 | End: 2020-09-20

## 2019-12-04 NOTE — PROGRESS NOTES
Subjective      Celeste Vallejo is a 40 y o  female who presents for annual well woman exam   Last Pap smear about 2 years ago  Denies history of abnormal Pap smear  Will collect Pap smear today  Last mammogram about 2 years ago  Has mammogram scheduled 2020, ordered by PCP  Periods are regular every 28-30 days, lasting 6 days  First 3 days are extremely heavy necessitating pad/tampon changes every 3-5 hours  Patient is currently anemic- H/H 7  0 MCV 63 on 19  Patient has additional lab work ordered by PCP  States she has had to have IV iron infusions and was on birth control pills in the past to control menses  She is interested in restarting oral contraceptives  Has tubal ligation for contraception  No intermenstrual bleeding, spotting, or discharge  Current contraception: tubal ligation  History of abnormal Pap smear: no  Family history of uterine or ovarian cancer: no  Regular self breast exam: no  History of abnormal mammogram: no  Family history of breast cancer: no  History of abnormal lipids: no  Gardasil:  No    Menstrual History:  OB History        3    Para   3    Term   3            AB        Living   3       SAB        TAB        Ectopic        Multiple        Live Births                    Menarche age: 15  Patient's last menstrual period was 2019 (exact date)  Period Cycle (Days): 30  Period Duration (Days): 6  Period Pattern: Regular  Menstrual Flow: Heavy, Moderate, Light  Menstrual Control Change Freq (Hours): heavy for first 3 days - changing pads every every 3-4 hours   Dysmenorrhea: None    The following portions of the patient's history were reviewed and updated as appropriate: allergies, current medications, past family history, past medical history, past social history, past surgical history and problem list     Review of Systems  Pertinent items are noted in HPI        Objective      /78 (BP Location: Left arm, Patient Position: Sitting, Cuff Size: Standard)   Pulse 77   Ht 5' 5" (1 651 m)   Wt 70 3 kg (155 lb)   LMP 11/09/2019 (Exact Date)   BMI 25 79 kg/m²     General:   alert and oriented, in no acute distress, alert, appears stated age and cooperative   Heart: regular rate and rhythm, S1, S2 normal, no murmur, click, rub or gallop   Lungs: clear to auscultation bilaterally   Abdomen: soft, non-tender, without masses or organomegaly, nondistended and normal bowel sounds   Vulva: normal, Bartholin's, Urethra, [place]'s normal, female escutcheon   Vagina: normal mucosa, normal discharge, no palpable nodules   Cervix: no bleeding following Pap, no cervical motion tenderness and Cervical polyp noted on exam   Removed without difficulty  Specimen sent to pathology  Uterus: normal size, non-tender, normal shape and consistency   Adnexa: normal adnexa and no mass, fullness, tenderness   Breast:  Nontender, no palpable masses, no nipple discharge, no skin changes bilaterally          Assessment      @well woman  no contraindication to begin hormonal therapy@   Plan      All questions answered  Await pap smear results  Biopsy Cervical polyp sent to pathology  Breast self exam technique reviewed and patient encouraged to perform self-exam monthly  Contraception: tubal ligation  Diagnosis explained in detail, including differential   Dietary diary  Discussed healthy lifestyle modifications  Educational material distributed  Follow up in 1 year  Follow up as needed  Thin prep Pap smear  Breast awareness reviewed    Encouraged to continue close contact with PCP for chronic conditions  Reviewed recommendations for influenza vaccine, patient declines despite recommendations  Reviewed recommendations for Gardasil vaccine up to age 39  Written information provided  Patient declines despite recommendation  Encouraged healthy diet, exercise and lifestyle  Anemia/menorrhagia-encouraged patient to obtain labs ordered per PCP    Labs printed and provided to patient  Options to control heavy menses reviewed  Patient would like to restart oral contraceptives  Rx Seasonique 1 tablet daily  Common side effects of breast tenderness, irregular vaginal bleeding and nausea reviewed  ACHES reviewed  Reviewed quick start method  Written information provided  Rx Ferrous sulfate 1 tablet every other day ordered  Encouraged iron rich diet  Written information provided add will recheck CBC in 3 months order entered  Will call with results  VBI-   BMI Counseling: Body mass index is 25 79 kg/m²  The BMI is above normal  Nutrition recommendations include reducing portion sizes, decreasing overall calorie intake and 3-5 servings of fruits/vegetables daily  Exercise recommendations include moderate aerobic physical activity for 150 minutes/week, exercising 3-5 times per week and strength training exercises

## 2019-12-04 NOTE — PATIENT INSTRUCTIONS
Suplementos de luis (Por la boca)   Se [place] para tratar el bajo nivel de luis en la nataly o anemia, ayudando al cuerpo en la producción de glóbulos rojos  Sb(s) : Beef/Iron/Wine, Bifera, BiferaRx, Corvite FE, Duofer, EZFE 200, Enfamil Corby-In-Sol, [facility] Iron Tablets, Fe-20, Femcon Fe, Femiron, Feosol, Corby-Iron, Emily haute, [place]   Existen muchas otras marcas de May  Emily medicamento no debe ser usado cuando:   No use emily medicamento si alguna vez ha tenido kristen reacción alérgica a los suplementos de luis o tiene kristen condición llamada hemacromatosis (por exceso de luis) o hemosiderosis (luis en los pulmones) o algún tipo de anemia que no haya sido causada por deficiencia de luis  Barry de usar emily medicamento:   Dov merritt líquido, [facility], [place], [place], [place], [facility], [place] de liberación prolongada, Tableta de liberación prolongada  · Fox doctor le dirá cuanta medicina usar y que tan a menudo  No tome mas medicina de la que le indique fox doctor, ni con mayor frecuencia  Siga cuidadosamente las instrucciones de fox médico, relacionadas con alguna dieta especial   · Es mejor em emily medicamento con el estómago vacío, 1 hora antes o 2 horas después de Narcisa comida  Tómese el medicamento con un vaso lleno de agua o con jugo de frutas  Si el medicamento le causa malestar estomacal, usted puede tomarlo con comida  · La tableta masticable debe ser masticada o triturada antes de tragarla  · Mida el líquido oral con kristen cuchara o medidor graduado para medir medicamentos  · El líquido oral puede manchar bridgett dientes  Estas manchas pueden prevenirse [employer] con agua u otros líquidos (harinder jugo de frutas o de [place]) y tomando el medicamento usando kristen pajilla (pitillo)  Para remover cualquier shanda de luis, cepille bridgett dientes usando bicarbonato de soda o peróxido    Si kristen dosis es olvidada:   · Use la dosis olvidada lo más pronto posible  No use la dosis olvidada si es hora de fox próxima dosis regular  · No use medicina adicional para compensar kristen dosis Francisco  Forma de guardar y botar robert medicamento:   · Guarde a temperatura ambiente, lejos del calor, la humedad y la jessica directa  · Guarde todos los medicamentos fuera del alcance de los niños y nunca comparta el medicamento con otras personas  Medicamentos y [employer] que debe evitar:   Consulte con fox médico o farmacéutico antes de usar cualquier medicamento, incluyendo los que compra sin receta médica, las vitaminas y los productos herbales  · No tome suplementos de luis por vía oral si también está recibiendo inyecciones de ulis  · No olvide informarle a fox médico si también está usando fenitoína (Dilantin®), ácido acetohidroxamínico (Lithostat®) o antibióticos harinder demeclociclina, , doxiciclina (Vibramycin®), Cipro®, Levaquin®, minociclina, moxifloxacina (Avelox®), Tequin® o tetraciclina  · Infórmele al médico si usted esta usando antiácidos (harinder Maalox® o Mylanta®)  · Por lo menos 1 hora antes o 2 horas después de em fox dosis de luis, [employer] siguientes alimentos o tómelos en pequeñas cantidades: [place], [place], yogur, té o café, cereales y panes integrales  Precauciones ishmael el uso de robert medicamento:   · No olvide informarle a fox médico si está embarazada o dando de lactar o si tiene problemas estomacales o intestinales, krisetn infección activa, diabetes, mercedez u otros problemas médicos  · Informe al doctor o dentista que lo trate que esta utilizando esta [place]  El luis puede Covel Zing de ciertos exámenes médicos  · El luis puede hacer que bridgett excrementos markel más oscuros  [place] es normal y no es motivo de preocupación    Efectos secundarios que pueden presentarse ishmael el uso de robert medicamento:   Consulte inmediatamente con el médico si nota cualquiera de estos efectos secundarios:  · Diarrea con nataly  · Color azulado en los labios, judith o uñas  · Dolor de pecho  · Stevie Olmos o escalofríos  · Piel pálida o fría y húmeda  · Cólicos estomacales severos o continuados, vómito (con o sin nataly)  · Respiración superficial, debilidad, ritmo cardíaco débil chauncey acelerado  Consulte con el médico si nota los siguientes efectos secundarios menos graves:   · Estreñimiento, diarrea, náuseas  · Orina oscura  · [employer] en las piernas  Consulte con el médico si nota otros efectos secundarios que tahmina son causados por robert medicamento  Llame a rankin médico para consultarle [address] puede notificar bridgett efectos secundarios al FDA al [id number]  © 2017 2600 Nnamdi Chery Information is for End User's use only and may not be sold, redistributed or otherwise used for commercial purposes  Esta información es sólo para uso en educación  Rankin intención no es darle un consejo médico sobre enfermedades o tratamientos  Colsulte con rankin Viva Hint farmacéutico antes de seguir cualquier régimen médico para saber si es seguro y efectivo para usted  Dieta caleb en luis   LO QUE NECESITA SABER:   ¿Qué es kristen dieta caleb en luis? Finn Mylar caleb en luis incluye los alimentos que son buenas rivera de luis  Las etapas de la arnulfo cuando las personas requieren más luis son ishmael la niñez, la adolescencia y el [place]  El luis es un mineral que rankin cuerpo necesita para producir hemoglobina  La hemoglobina es parte de rankin nataly y Vaughn a llevar el oxígeno de bridgett pulmones al riya de rankin cuerpo  Podría ser necesario que usted consuma alimentos ricos en luis para tratar o prevenir niveles bajos de luis en la nataly o anemia por deficiencia de luis  ¿Cuánto lius necesito consumir cada día?    · Varones:      ¨ 1 a 3 años de edad: 7 mg    ¨ 4 a 8 años de edad: 10 mg    ¨ 9 a 13 años de edad: 8 mg    ¨ 14 a 18 años de edad: 11 mg    ¨ 19 años y mayores: 8 mg    · Mujeres:      ¨ 1 a 3 años de edad: 7 mg    ¨ 4 a 8 años de edad: 10 mg    ¨ 9 a 13 años de edad: 8 mg    ¨ 14 a 18 años de edad: 15 mg    ¨ 19 a 50 años de edad: 18 mg    ¨ Mayores de 51 años de edad: 8 mg    ¨ Hembras embarazadas:  27 mg  ¿Cuáles alimentos contienen luis? · La carne, el pescado y las aves son buenas rivera de luis  Estas contienen luis hemo, kristen clase de luis que el cuerpo absorbe con facilidad  Las frutas, verduras, [employer] y [place], el arroz y los cereales también contienen luis  Estos alimentos contienen luis no hemo, kristen clase de luis que el cuerpo no absorbe tan fácilmente harinder el luis hemo  Rankin cuerpo puede absorber más luis de estos alimentos si ingiere un alimento con alto contenido de vitamina C al [employer]  También aumenta la absorción del luis no hemo al consumir alimentos de carne, pescado o aves de hansen al [employer]  · El pescado y los mariscos contienen abhinav, un metal que puede ser dañino  Los bebés no nacidos y los niños corren mayor riesgo de daño provocado por el abhinav  Los niños y las mujeres embarazadas deberían de evitar el consumo de pescados rico en abhinav johan harinder el tiburón y el pez lex  Estas personas también deben consumir sólo los pescados con bajo contenido de abhinav, harinder el salmón, bagre y las conservas de atún  Limite el consumo de pescado y mariscos con bajo contenido de abhinav a menos de 12 onzas por semana  ¿Cuáles son algunos alimentos ricos en luis?    · Alimentos que contienen 2 mg o más por porción:      ¨ 3 onzas de carne de res cocida (espaldilla o pierna) o pavo cocido (carne oscura)    ¨ ½ taza de frijoles (negros, habichuelas, lentejas o de soya)    ¨ ½ taza de tofú    ¨ 1 papa mediana cocida    ¨ 1 taza de alcachofa cocida o espinacas cocidas    ¨ ¾ de taza de tennille instantánea    ¨ 1 taza de hojuelas de maíz    · [employer] contienen 1 a 2 mg por porción:      ¨ 3 onzas de ynes    ¨ 3 onzas de cerdo    ¨ 3 onzas de TERE [employer] (carne lorin)    ¨ 3 onzas de atún light (reducido en calorías)    ¨ ½ taza de pasas sin semilla    ¨ 1 rebanada de pan integral o pan jerome  ¿Cuáles alimentos son buenas rivera de vitamina C? Consuma kristen porción de vitamina C con un alimento rico en luis para ayudar a fox cuerpo a absorber más luis  Las siguientes frutas y vegetales son buenas rivera de vitamina C:  · 1 taza de jugo de naranja fresco (124 mg) o jugo de toronja (83 mg)    · 1 taza de fresas (106 mg)    · 1 taza de melón chan (68 mg)     · 1 taza de pimentón amarillo pablito (283 mg)    · 1 taza de brócoli fresco, hervido (116 mg) o 1 taza de coles de Bruselas cocidas (97 mg)    · 1 taza de col rizada (53 mg)    · 1 taza de jugo de tomate (45 mg)  ¿Qué otras pautas kelley seguir? · El té y el café pueden disminuir la cantidad de luis que fox cuerpo absorbe de los alimentos ricos en luis  No milind café y té con los alimentos que son ricos en luis  · Los [employer] de 1 año sólo necesitan 24 onzas de [place] de bhavin al día  Cuando los niños bebemiguel [facility], es probable que coman menos alimentos ricos en luis  [place] puede causar que presenten un nivel bajo de luis en la nataly  ¿Cuáles son los riesgos de no seguir kristen dieta caleb en luis? Si usted no incluye alimentos ricos en luis y vitamina C en fox dieta cada día, podría provocar que bridgett niveles de luis disminuyan  [place] puede conducir a anemia por deficiencia de luis, especialmente ishmael etapas cuando fox cuerpo necesita más luis  La anemia por deficiencia de luis podría causar problemas con el crecimiento de fox fabiana y fox desarrollo  Si usted sufre de anemia por deficiencia de luis, es posible que desarrolle otros problemas de [place]  ACUERDOS SOBRE FOX CUIDADO:   Usted tiene el derecho de ayudar a planear fox cuidado  Discuta bridgett opciones de tratamiento con bridgett médicos para decidir el cuidado que usted desea recibir   Usted siempre tiene el derecho de rechazar el tratamiento  Esta información es sólo para uso en educación  Fox intención no es darle un consejo médico sobre enfermedades o tratamientos  Colsulte con fox Viva Hint farmacéutico antes de seguir cualquier régimen médico para saber si es seguro y efectivo para usted  © 2017 2600 Nnamdi Chery Information is for End User's use only and may not be sold, redistributed or otherwise used for commercial purposes  All illustrations and images included in CareNotes® are the copyrighted property of A D A M , Inc  or Derek Light  Anemia por deficiencia de luis   LO QUE NECESITA SABER:   ¿Qué es la anemia por deficiencia de ulis? La anemia por deficiencia de luis consiste en niveles bajos de glóbulos rojos y hemoglobina debido a la falta de luis en la nataly  El luis ayuda a producir hemoglobina  La hemoglobina es parte de bridgett glóbulos rojos y [place] a transportar el oxígeno a fox cuerpo  Las causas más comunes son la pérdida de nataly y el no ingerir alimentos que tengan suficiente luis  ¿Qué aumenta mi riesgo de presentar anemia por deficiencia de luis? · La menstruación de Jacqulyne Shawl rell    · Donar nataly más de 5 veces al año    · El embarazo y la lactancia    · Leonor dieta vegetariana estricta (vegana)    · Los analgésicos antiinflamatorios no esteroides (AINEs) harinder el ibuprofeno o la aspirina    · Un trauma o un sangrado intestinal  ¿Cuáles son los signos y síntomas de la anemia por deficiencia de luis? · Debilidad y cansancio    · Falta de aire al realizar Jacqulyne Shawl actividad    · Latidos cardíacos rápidos o mareos    · Dolor de ernesto o dificultad para concentrarse    · Piel pálida    · Dolor o inflamación en la lengua y boca    · [place] que se quiebran con facilidad    · Lilo Diones gabriella necesidad de comer hielo, pintura, almidón o mary  ¿Cómo se diagnostica la anemia por deficiencia de luis?    · Los análisis de nataly:  mostrarán la cantidad de luis que hay en fox nataly y la forma en que fox cuerpo Yahoo luis  · Lacretia Sovereign de materia fecal  mostrará si hay nataly en fox materia fecal     · Leonor endoscopia  es un procedimiento que se utiliza para determinar si hay sangrado en el esófago o el BJURHOLM  Un endoscopio es un tubo flexible con José Manuel Maser jessica y cámara en el extremo  Se introduce por fox boca y garganta hasta llegar al esófago  · Leonor colonoscopia  es un procedimiento que se utiliza para determinar si hay sangrado en bridgett intestinos  Se introduce un endoscopio por el recto  ¿Cuál es el tratamiento para la anemia por deficiencia de luis? · Suplementos de luis o ácido fólico  ayudan a elevar los niveles de hemoglobina y glóbulos rojos  · Los ablandadores de evacuaciones  se podrían necesitar si los suplementos de luis causan estreñimiento  · Leonor transfusión de nataly  podría ser necesaria si fox anemia es grave  [place] le ayudará a reemplazar la nataly y el luis que ha perdido  ¿Cómo puedo controlar los síntomas? · Consuma alimentos ricos en luis y proteínas  Alimentos altos en luis y proteína son por ejemplo los anastacia secos, gildardo, verduras de hojas verdes oscuras y frijoles  No tome café, té u otros líquidos que contengan cafeína  Limite el consumo de [place] a 2 tazas al día  Es posible que necesite consultar a un nutricionista para crear un plan de alimentación adecuado para usted  · Consuma líquidos según le indicaron  para ayudar a prevenir el estreñimiento  Pregunte cuánto líquido debe em cada día y cuáles líquidos son los más adecuados para usted  Llame al 911 en ailyn de presentar lo siguiente:   · Usted tiene falta de aliento incluso cuando descansa  ¿Cuándo kelley buscar atención inmediata? · Usted tiene evacuaciones intestinales oscuras o con nataly  · Usted vomita nataly  · Usted está demasiado mareado para ponerse de pie  · Usted tiene dificultad para tragar debido al dolor en fox boca y garganta    ¿Cuándo kelley comunicarme con mi médico?   · Usted tiene [employer], estreñimiento o diarrea  · Usted tiene náuseas o está vomitando  · Usted está mareado o muy cansado  · Usted tiene preguntas o inquietudes acerca de fox condición o cuidado  ACUERDOS SOBRE FOX CUIDADO:   Usted tiene el derecho de ayudar a planear fox cuidado  Aprenda todo lo que pueda sobre fox condición y harinder darle tratamiento  Discuta bridgett opciones de tratamiento con bridgett médicos para decidir el cuidado que usted desea recibir  Usted siempre tiene el derecho de rechazar el tratamiento  Esta información es sólo para uso en educación  Fox intención no es darle un consejo médico sobre enfermedades o tratamientos  Colsulte con fox Fabiola Merl farmacéutico antes de seguir cualquier régimen médico para saber si es seguro y efectivo para usted  © 2017 2600 [facility] Information is for End User's use only and may not be sold, redistributed or otherwise used for commercial purposes  All illustrations and images included in CareNotes® are the copyrighted property of A D A M , Inc  or Derek Light  Menorragia   LO QUE NECESITA SABER:   ¿Qué es la menorragia? La menorragia es el sangrado menstrual intenso que dura más de 7 días o sangrado menstrual severo que dura menos de 7 días  Fox sangrado y rachael menstruales son tan teodora que usted tiene dificultad para realizar bridgett actividades diarias  Fox menstruación también podría ocurrir [place] frecuentemente y usted podría incluso sangrar entre periodos  La menorragia es común en adolescentes y cuando se acerca la menopausia  ¿Qué causa la menorragia?    · Un desequilibrio hormonal    · Ovarios que no funcionan correctamente y no pueden producir huevos    · Fibromas o pólipos uterinos en el revestimiento del útero    · Adenomiosis (engrosamiento del útero)    · Leonor infección pélvica o dispositivo intrauterino (DIU)    · Complicaciones ishmael el embarazo, harinder leonor pérdida o un [place] ectópico    · Condiciones harinder trastornos de la coagulación de la nataly o cáncer uterino  ¿Qué aumenta mi riesgo de menorragia? · Obesidad    · No lamberto tenido niños    · Uso de medicamentos anticoagulantes    · Estructura anormal de bridgett órganos reproductivos    · Historial familiar de cáncer endometrial o del colon  ¿Cuáles son los signos y síntomas de la menorragia? · Empapar kristen toalla sanitaria cada 1 a 2 horas    · El uso de Chong Forrester, Narcisa toalla sanitaria y un tampón    · Despertarse por las noches para cambiarse la toalla o el tampón    · Coágulos sanguíneos además del sangrado y por más de 1 día    · Dolor o calambres abdominales  ¿Cómo se diagnostica la menorragia? Fox médico le preguntará acerca de bridgett síntomas y lo examinará  Dígale la frecuencia con la que usted se cambia las toallas sanitarias o tampón  Es probable que la examine por otros signos de sangrado, harinder moretones o encías sangrientas  Es probable también que le pregunte si algo le [employer], harinder el calor o el medicamento  Dígale a fox médico si usted es sexualmente activa o ha [employer]  Es posible que usted necesite alguno de los siguientes:  · Los análisis de nataly  determinará si usted Naaman Sages y la causa de fox pérdida de nataly  Un examen de nataly también muestra si hay anemia causada por la menorragia  · Un examen pélvico y un papanicolau  podrían necesitarse para revisar el tamaño y forma de fox útero y ovarios  Fox médico introduce cuidadosamente un espéculo en la vagina  El espéculo es un instrumento que abre la vagina para poder liberty el kay del [place]  · Kristen biopsia  es un procedimiento para extraer Narcisa pequeña muestra de tejido del endometrio  El tejido se envía a un laboratorio para hacerle exámenes  · Kristen ecografía  podría mostrar la causa de fox sangrado  Se usan ondas sonoras para mostrar imágenes en un monitor  · Kristen histeroscopia  es un procedimiento para examinar fox endometrio   El endometrio es el revestimiento por dentro del [place]  Fox médico insertará en fox útero un tubo pequeño con kristen cámara en la punta  ¿Cómo se trata la menorragia? · Medicamentos:      ¨ Pueden administrarle suplementos de luis  podrían administrarse si fox nivel de luis en la nataly disminuye debido a la hemorragia  ¨ AINEs (Analgésicos antiinflamatorios no esteroides) , harinder el ibuprofeno, tratan los rachael de la [place]  Robert medicamento esta disponible con o sin kristen receta médica  Los AINEs pueden causar sangrado estomacal o problemas renales en ciertas personas  Si usted araseli un medicamento anticoagulante, siempre pregúntele a fox médico si los TYLER son seguros para usted  Siempre eber la etiqueta de robert medicamento y [place] instrucciones  ¨ Hormonas  ayudan a disminuir o parar fox sangrado y hace que bridgett periodos menstruales markel más regulares  Estos medicamentos podrían darse en pastillas anticonceptivas o por medio de un DIU  · [place] Colon y los procedimientos  pueden ser necesarios si no se pueden usar medicamentos o los medicamentos no dan resultado  Es posible que le tengan que realizar procedimientos para controlar el sangrado, harinder kristen ablación endometrial o kristen dilatación y [address])  Usted podría requerir de kristen embolización uterina o kristen histerectomía  Pregúntele a fox médico sobre estos y otros procedimientos que usted pudiera necesitar  ¿Cómo puedo controlar los síntomas? · Tenga siempre disponible un buen suministro de toallas sanitarias o tampones  Si es posible, no esté lejos del baño  · La aplicación de calor  sobre el abdomen de 20 a 30 minutos cada 2 horas por los [employer] indiquen  El calor ayuda a disminuir el dolor y los calambres  Llame al 911 en ailyn de presentar lo siguiente:   · Usted tiene dolor de pecho y falta de [place]  · Fox corazón está agitado o late más rápido de lo usual en fox ailyn  ¿Cuándo kelley buscar atención inmediata?    · Usted se siente mareado cuando se pone de pie  · Se siente confundido  · Usted tiene dolor abdominal severo, náuseas y vómitos  · Fox piel o las partes xin de bridgett ojos se vuelven amarillentas  ¿Cuándo kelley comunicarme con mi médico?   · Usted tiene que [employer] o tampón [place] de 1 vez por hora, por varias horas seguidas  · Usted se siente más débil y cansado de lo normal      · Usted empieza a sentir frío en las judith y pies  · Usted tiene preguntas o inquietudes acerca de fox condición o cuidado  ACUERDOS SOBRE FOX CUIDADO:   Usted tiene el derecho de ayudar a planear fox cuidado  Aprenda todo lo que pueda sobre fox condición y harinder darle tratamiento  Discuta bridgett opciones de tratamiento con bridgett médicos para decidir el cuidado que usted desea recibir  Usted siempre tiene el derecho de rechazar el tratamiento  Esta información es sólo para uso en educación  Fox intención no es darle un consejo médico sobre enfermedades o tratamientos  Colsulte con fox Alex Mad farmacéutico antes de seguir cualquier régimen médico para saber si es seguro y efectivo para usted  © 2017 2600 [facility] Information is for End User's use only and may not be sold, redistributed or otherwise used for commercial purposes  All illustrations and images included in CareNotes® are the copyrighted property of A D A M , Inc  or Derek Light  Mamografía   INFORMACIÓN GENERAL:   ¿Qué es Vanessa Poot? Kristen mamografía es kristen radiografía que revisa bridgett senos para cáncer  ¿Quién debería tener kristen mamografía? Usted debe realizarse kristen mamografía si siente kristen protuberancia o nota otros cambios ishmael un autoexamen de los senos  Consulte con fox médico acerca de cuándo usted debería iniciar a realizar las mamografías  ¿Cómo se prepara para la mamografía? · No se aplique desodorante, talco, crema o perfume  Estos productos podrían provocar que aparezcan partículas en la mamografía      · Vístase con ropa de dos piezas  · Es necesario que se exprima la leche materna antes de realizar la mamografía si usted está amamantando  · [place] consigo kristen lista con las fechas y el lugar de las [place], estudios de los senos u otros tratamientos que le hoff realizado anteriormente  · Si bridgett senos se encuentran sensibles antes de fox período mensual, no realice krisetn [employer]  Programe fox olesya para la mamografia para kristen semana después de terminar fox período mensual   ¿Cómo se [place]? Usualmente se araseli kristen radiografía de vista superior y Marney Herb de vista lateral para cada seno  Infórmele a los médicos si usted tiene implantes mamarios o problemas en bridgett senos antes de realizar la mamografía  Es posible que necesite radiografías adicionales de cada seno  · Se le dará [facility]  Remueva fox ropa de la cintura para [facility]  Se [place] la [facility] con la apertura en la parte delantera  · Usted se sentará o se parará cerca de kristen pequeña máquina de ever[place] X  El médico le ayudará a colocar christianne de bridgett senos sobre la lámina de [place] X  Se mueve fox brazo y el seno hasta encontrar la posición correcta  · Fox seno se presiona suavemente entre [place] de plástico por unos segundos mientras se araseli la radiografía  [place] podría sentir incómodo  · Se le pedirá que contenga fox respiración mientras se araseli la radiografía  Se efectuará otra radiografía en el mismo seno, luego de modificar la posición de la máquina de [place] X     · Examinarán el otro seno de la [place]  ¿Qué sucede después de la mamografía? Usted podría presentar sensibilidad en bridgett senos por kristen duración corta de tiempo después de la mamografía  Usted puede realizar todas bridgett actividades regulares después de la [place]  Pregúntele al médico cuándo se supone que usted recibirá bridgett [place]  ¿Cuáles son los riegos de la mamografía? Se le expondrá a kristen pequeña cantidad de radiación   Es posible que no se detecte algunos cánceres con la mamografía  ¿Cuándo necesito comunicarme con el médico?  Comuníquese con fox médico de cabecera si:  · Usted no puede acudir a fox olesya a tiempo  · Usted no recibe los resultados cuando esperaba  · Usted tiene preguntas o inquietudes acerca de la mamografía  ACUERDOS SOBRE FOX CUIDADO:   Usted tiene el derecho de participar en la planificación de fox cuidado  Aprenda todo lo que pueda sobre fox condición y harinder darle tratamiento  Discuta con bridgett médicos bridgett opciones de tratamiento para juntos decidir el cuidado que usted quiere recibir  Usted siempre tiene el derecho a rechazar fox tratamiento  Esta información es sólo para uso en educación  Fox intención no es darle un consejo médico sobre enfermedades o tratamientos  Colsulte con fox [place] [employer] farmacéutico antes de seguir cualquier régimen médico para saber si es seguro y efectivo para usted  © 2014 [address] is for End User's use only and may not be sold, redistributed or otherwise used for commercial purposes  All illustrations and images included in CareNotes® are the copyrighted property of A D A M , Inc  or Derek Light  Autoexamen del seno para las mujeres   LO QUE NECESITA SABER:   ¿Qué es el autoexamen de seno? Un autoexamen de seno es Demarcus de revisar si bridgett senos tienen protuberancias u otros cambios  Los autoexámenes de seno regulares pueden ayudarla a conocer cómo se peace y se sienten bridgett senos normalmente  La mayoría de las protuberancias o cambios en el seno  no son cáncer, chauncey usted siempre debería ir con fox médico para que la revise  Fox médico también puede observarla e indicarle si usted está realizando fox autoexamen correctamente  ¿Por qué debería realizarme un autoexamen de seno? El cáncer de seno es el tipo de cáncer más común en las mujeres  Aún si a usted le [employer], todavía puede seguir revisándose regularmente   Si usted sabe cómo se sienten y se peace bridgett senos normalmente, eso podría ayudarle a determinar cuándo comunicarse con fox médico  Es posible que kristen mamografía no detecte algún cáncer  Usted podría encontrar kristen protuberancia ishmael un autoexamen de seno que no se detectó con fox mamografía  ¿Cuándo debería realizarme un autoexamen de seno? Quan fox calendario para que recuerde hacerse un autoexamen del seno en kristen fecha regular  Kristen forma fácil de recordar es realizar el autoexamen de seno en el mismo día cada mes  Si usted tiene [place], es posible que lo mejor sea que se johnny un autoexamen 1 semana después de que terminó fox periodo  [place] es cuando bridgett senos podrían estar menos inflamados, abultados o sensibles  Usted puede realizarse autoexámenes del seno regulares incluso si está dando de lactar o si tiene implantes en el seno  ¿Cómo debería realizarme un autoexamen de seno? · Observe bridgett senos en un og  Observe el tamaño y la forma de cada seno y del pezón  Revise si hay inflamación, protuberancias, hoyuelos, piel descamada u otros cambios en la piel  Vigile los cambios en el pezón, harinder cuando tiene dolor o que comienza a hundirse  Apriete cuidadosamente ambos pezones y revise si sale líquido (que no sea [place]) de ellos  Si usted detecta cualquiera de estos u otros cambios en bridgett senos, comuníquese con fox médico  Revise bridgett senos mientras está sentada o tapia en las 3 siguientes posiciones:    ¨ Cuelgue bridgett brazos en bridgett costados  ¨ Levante bridgett judith y [place] detrás de fox ernesto  ¨ Ejerza presión firme con bridgett judith sobre bridgett caderas  Inclínese un poco hacia adelante mientras observa bridgett senos en el og  · Acuéstese y palpe bridgett senos  Cuando usted se [place], el tejido de bridgett senos se extiende uniformemente sobre fox pecho  [place] facilita que usted sienta protuberancias o cualquier cosa que podría no ser normal para bridgett senos   Realice un autoexamen con un seno a la vez     ¨ Coloque kristen almohada pequeña o kristen toalla debajo de fox hombro bimal  Coloque fox brazo bimal detrás de fox ernesto  ¨ Use los 3 dedos medios de fox mano derecha  Use las yemas de los dedos, en la parte superior  La yema del dedo es la parte más sensible de fox dedo  ¨ Shayan círculos pequeños para sentir el tejido del seno  Use las yemas de bridgett dedos para hacer círculos pequeños empalmados sobre bridgett senos y [place]  Use presión ligera, media y firme  Halima, presione ligeramente  Después, presione con kristen presión media para sentir un poco más profundo en fox seno  Por último, use presión firme para sentir fox seno en lo más profundo  ¨ Examine el área completa de fox seno  Examine el área del seno desde arriba hasta abajo donde usted siente las [place]  Genita Sonido pequeños con las yemas de los dedos, comenzando en la parte media de fox axila  Genita Sonido subiendo y [place] el área del seno  Continúe hacia fox seno, hasta llegar al [employer]  Examine toda el área desde la axila hasta el centro de fox pecho ([facility])  Deténgase en el centro de fox pecho  ¨ Mueva la almohada o toalla hacia fox hombro derecho y coloque fox brazo derecho detrás de fox ernesto  Use las yemas de los 3 dedos medios de fox mano izquierda y repita los pasos anteriores para realizar un autoexamen en fox seno derecho  ¿Qué más puedo hacer para la revisión de problemas de seno o del cáncer de seno? Algunos expertos sugieren que las mujeres de 36 años de edad y mayores deberían realizarse kristen mamografía cada año  Otros sugieren [employer] 48 y 76 años de edad se akil kristen mamografía cada 2 años  Consulte con fox médico sobre cuándo usted debería [employer]  ¿Cuándo kelley comunicarme con mi médico?   · Usted encuentra algún tipo de bulto o cambio en bridgett senos  · Usted tiene [facility], o le sale líquido de bridgett pezones      · Usted tiene preguntas o inquietudes acerca de fox Remona Pare  ACUERDOS SOBRE PEREZ CUIDADO:   Usted tiene el derecho de ayudar a planear perez cuidado  Aprenda todo lo que pueda sobre perez condición y harinder darle tratamiento  Discuta bridgett opciones de tratamiento con bridgett médicos para decidir el cuidado que usted desea recibir  Usted siempre tiene el derecho de rechazar el tratamiento  Esta información es sólo para uso en educación  Perez intención no es darle un consejo médico sobre enfermedades o tratamientos  Colsulte con perez [place] farmacéutico antes de seguir cualquier régimen médico para saber si es seguro y efectivo para usted  © 2017 [address] is for End User's use only and may not be sold, redistributed or otherwise used for commercial purposes  All illustrations and images included in CareNotes® are the copyrighted property of A D A M , Inc  or Derek Light  Prueba de extensión de Pap   INFORMACIÓN GENERAL:   ¿Qué es kristen extensión de Pap? [place] Blood de Pap, o prueba de Pap, es un procedimiento para buscar células anormales en perez kay uterino o cérvix  El kay uterino es la abertura estrecha en la parte inferior de [place]  El kay uterino se une a la parte superior de la vagina  ¿Cómo me preparo para kristen extensión de Pap? El mejor momento para programar kristen prueba es inmediatamente después que termine perez menstruación  No se realice kristen extensión de Pap ishmael perez menstruación  No tenga relaciones sexuales o inserte nada dentro de perez vagina ishmael 24 horas antes de perez examen de extensión de Pap  ¿Qué ocurrirá ishmael kristen extensión de Pap? · Usted se acostará sobre perez espalda y colocará bridgett pies en unos posapiés conocidos harinder estribos  Perez médico cuidadosamente insertará un aparato conocido harinder espéculo dentro de perez vagina  El espéculo se [place] para expandir las balbuena de perez vagina para que él pueda liberty perez cérvix   Él utilizará kristen brocha delgada o un hisopo de algdeandra para obtener células de la parte interior de perez cérvix  · Perez médico también obtendrá células de la superficie de perez cérvix con kristen herramienta de plástico o lee conocida harinder espátula  Es posible que él también raspe cuidadosamente la parte superior de eprez vagina para obtener [place] son colocadas en un envase con líquido o en kristen lámina de yu  Estos se envían a un laboratorio para ser analizados en busca de células anormales  ¿Con qué frecuencia necesito kristen extensión de Pap? Las extensiones de Pap generalmente son realizadas cada 1 a 3 años  Usted puede que necesite kristen extensión de Pap más frecuentemente si usted tiene cualquiera de lo siguiente:  · Un resultado positivo de la prueba del virus del papiloma humano (VPH)    · Intraepitelial neoplasma cervical o cáncer cervical    · VIH    · Un sistema inmune débil    · Exposición al [place] dietilstilbestrol (YRN) cuando perez madre estuvo embarazada de usted  ACUERDOS SOBRE PEREZ CUIDADO:   Usted tiene el derecho de participar en la planificación de perez cuidado  Aprenda todo lo que pueda sobre perez condición y harinder darle tratamiento  Discuta con bridgett médicos bridgett opciones de tratamiento para juntos decidir el cuidado que usted quiere recibir  Usted siempre tiene el derecho a rechazar perez tratamiento  Esta información es sólo para uso en educación  Perez intención no es darle un consejo médico sobre enfermedades o tratamientos  Colsulte con perez [place] farmacéutico antes de seguir cualquier régimen médico para saber si es seguro y efectivo para usted  © 2014 [address] is for End User's use only and may not be sold, redistributed or otherwise used for commercial purposes  All illustrations and images included in CareNotes® are the copyrighted property of A D A M , Inc  or Deerk Light    © 2017 [facility] [facility] Information is for End User's use only and may not be sold, redistributed or otherwise used for commercial purposes  All illustrations and images included in CareNotes® are the copyrighted property of A D A M , Inc  or Derek Light  Birth Control Pills   WHAT YOU NEED TO KNOW:   What are birth control pills? Birth control pills are also called oral contraceptives, or the pill  It is medicine that helps prevent pregnancy  Birth control pills work by preventing ovulation  Ovulation is when the ovaries make and release an egg cell each month  If this egg gets fertilized by sperm, pregnancy occurs  Birth control pills may also help to prevent pregnancy by keeping sperm from fertilizing an egg  What may be done before I can start taking birth control pills? You need to see your healthcare provider to get a prescription  Any of the following may be done before your healthcare provider gives you a prescription:  · Your healthcare provider will ask you about diseases and illnesses you have had in the past  He will check your risk for blood clots, heart conditions, or stroke  He will also check your blood pressure, and may do a breast and pelvic exam  A Pap smear may also be done during the pelvic exam  This is a test to make sure you do not have abnormal changes on your cervix  You may need other tests, such as a urine test, to make sure you are not pregnant  · Your healthcare provider will ask if you take any medicines and if you smoke  Smoking increases your risk for stroke, heart attack, or a blood clot in your lungs  If you smoke, you should not take certain kinds of birth control pills  What are the advantages of birth control pills? When birth control pills are used correctly, the chances of getting pregnant are very low  Birth control pills may help decrease bleeding and pain during your monthly period  They may also help prevent cancer of the uterus and ovaries  What are the disadvantages of birth control pills?   You may have sudden changes in your mood or feelings while you take birth control pills  You may have nausea and decreased sex drive  You may have an increased appetite and rapid weight gain  You may also have bleeding in between periods, less frequent periods, vaginal dryness, and breast pain  Birth control pills will not protect you from sexually transmitted infections  Rarely, some birth control pills can increase your risk for a blood clot  This may become life-threatening  What should I do if I decide I want to get pregnant? If you are planning to have a baby, ask your healthcare provider when you may stop taking your birth control pills  It may take some time for you to start ovulating again  Ask your healthcare provider for more information about pregnancy after birth control pills  When should I start taking birth control pills after I have a baby? If you are not breastfeeding, you may start taking birth control pills 3 weeks after you give birth  You may be able to take certain types of birth control pills if you are breastfeeding  These pills can be started from 6 weeks to 6 months after you give birth  Ask your healthcare provider for more information about when to start taking birth control pills after you give birth  When should I contact my healthcare provider? · You have forgotten to take a birth control pill  · You have mood changes, such as depression, since starting birth control pills  · You have nausea or you are vomiting  · You have severe abdominal pain  · You missed a period and have questions or concerns about being pregnant  · You still have bleeding 4 months after taking birth control pills correctly  · You have questions or concerns about your condition or care  When should I seek immediate care or call 911? · Your arm or leg feels warm, tender, and painful  It may look swollen and red  · You feel lightheaded, short of breath, and have chest pain  · You cough up blood      · You have any of the following signs of a stroke:      ¨ Numbness or drooping on one side of your face     ¨ Weakness in an arm or leg    ¨ Confusion or difficulty speaking    ¨ Dizziness, a severe headache, or vision loss    · You have severe pain, numbness, or swelling in your arms or legs  CARE AGREEMENT:   You have the right to help plan your care  Learn about your health condition and how it may be treated  Discuss treatment options with your caregivers to decide what care you want to receive  You always have the right to refuse treatment  The above information is an  only  It is not intended as medical advice for individual conditions or treatments  Talk to your doctor, nurse or pharmacist before following any medical regimen to see if it is safe and effective for you  © 2017 [facility] Information is for End User's use only and may not be sold, redistributed or otherwise used for commercial purposes  All illustrations and images included in CareNotes® are the copyrighted property of A TERE [employer] SHE[employer] , Inc  or Derek Light

## 2019-12-05 LAB
HPV HR 12 DNA CVX QL NAA+PROBE: NEGATIVE
HPV16 DNA CVX QL NAA+PROBE: NEGATIVE
HPV18 DNA CVX QL NAA+PROBE: NEGATIVE

## 2019-12-06 LAB
LAB AP GYN PRIMARY INTERPRETATION: NORMAL
Lab: NORMAL

## 2019-12-09 NOTE — PROGRESS NOTES
Overdue Results Reminder    Per patient's chart, mammogram is scheduled for 1/8/20      Mammo screening bilateral w cad

## 2019-12-14 ENCOUNTER — APPOINTMENT (OUTPATIENT)
Dept: LAB | Facility: HOSPITAL | Age: 44
End: 2019-12-14

## 2019-12-14 ENCOUNTER — TELEPHONE (OUTPATIENT)
Dept: OTHER | Facility: HOSPITAL | Age: 44
End: 2019-12-14

## 2019-12-14 ENCOUNTER — TELEPHONE (OUTPATIENT)
Dept: OTHER | Facility: OTHER | Age: 44
End: 2019-12-14

## 2019-12-14 DIAGNOSIS — Z13.220 SCREENING, LIPID: ICD-10-CM

## 2019-12-14 DIAGNOSIS — Z13.1 SCREENING FOR DIABETES MELLITUS: ICD-10-CM

## 2019-12-14 DIAGNOSIS — D64.9 ANEMIA, UNSPECIFIED TYPE: ICD-10-CM

## 2019-12-14 DIAGNOSIS — N92.0 MENORRHAGIA WITH REGULAR CYCLE: ICD-10-CM

## 2019-12-14 DIAGNOSIS — Z11.4 SCREENING FOR HIV (HUMAN IMMUNODEFICIENCY VIRUS): ICD-10-CM

## 2019-12-14 LAB
ALBUMIN SERPL BCP-MCNC: 3.6 G/DL (ref 3.5–5)
ALP SERPL-CCNC: 53 U/L (ref 46–116)
ALT SERPL W P-5'-P-CCNC: 19 U/L (ref 12–78)
ANION GAP SERPL CALCULATED.3IONS-SCNC: 5 MMOL/L (ref 4–13)
AST SERPL W P-5'-P-CCNC: 10 U/L (ref 5–45)
BASOPHILS # BLD AUTO: 0.05 THOUSANDS/ΜL (ref 0–0.1)
BASOPHILS NFR BLD AUTO: 1 % (ref 0–1)
BILIRUB SERPL-MCNC: 0.54 MG/DL (ref 0.2–1)
BUN SERPL-MCNC: 7 MG/DL (ref 5–25)
CALCIUM SERPL-MCNC: 9.3 MG/DL (ref 8.3–10.1)
CHLORIDE SERPL-SCNC: 107 MMOL/L (ref 100–108)
CHOLEST SERPL-MCNC: 115 MG/DL (ref 50–200)
CO2 SERPL-SCNC: 25 MMOL/L (ref 21–32)
CREAT SERPL-MCNC: 0.53 MG/DL (ref 0.6–1.3)
EOSINOPHIL # BLD AUTO: 0.12 THOUSAND/ΜL (ref 0–0.61)
EOSINOPHIL NFR BLD AUTO: 2 % (ref 0–6)
ERYTHROCYTE [DISTWIDTH] IN BLOOD BY AUTOMATED COUNT: 21.1 % (ref 11.6–15.1)
GFR SERPL CREATININE-BSD FRML MDRD: 116 ML/MIN/1.73SQ M
GLUCOSE P FAST SERPL-MCNC: 86 MG/DL (ref 65–99)
HCT VFR BLD AUTO: 26.9 % (ref 34.8–46.1)
HDLC SERPL-MCNC: 49 MG/DL
HGB BLD-MCNC: 6.9 G/DL (ref 11.5–15.4)
IMM GRANULOCYTES # BLD AUTO: 0.02 THOUSAND/UL (ref 0–0.2)
IMM GRANULOCYTES NFR BLD AUTO: 0 % (ref 0–2)
LDLC SERPL CALC-MCNC: 56 MG/DL (ref 0–100)
LYMPHOCYTES # BLD AUTO: 1.43 THOUSANDS/ΜL (ref 0.6–4.47)
LYMPHOCYTES NFR BLD AUTO: 18 % (ref 14–44)
MCH RBC QN AUTO: 16.2 PG (ref 26.8–34.3)
MCHC RBC AUTO-ENTMCNC: 25.3 G/DL (ref 31.4–37.4)
MCV RBC AUTO: 64 FL (ref 82–98)
MONOCYTES # BLD AUTO: 0.75 THOUSAND/ΜL (ref 0.17–1.22)
MONOCYTES NFR BLD AUTO: 9 % (ref 4–12)
NEUTROPHILS # BLD AUTO: 5.67 THOUSANDS/ΜL (ref 1.85–7.62)
NEUTS SEG NFR BLD AUTO: 70 % (ref 43–75)
NONHDLC SERPL-MCNC: 66 MG/DL
NRBC BLD AUTO-RTO: 0 /100 WBCS
PLATELET # BLD AUTO: 710 THOUSANDS/UL (ref 149–390)
PMV BLD AUTO: 10.1 FL (ref 8.9–12.7)
POTASSIUM SERPL-SCNC: 4 MMOL/L (ref 3.5–5.3)
PROT SERPL-MCNC: 7.6 G/DL (ref 6.4–8.2)
RBC # BLD AUTO: 4.19 MILLION/UL (ref 3.81–5.12)
SODIUM SERPL-SCNC: 137 MMOL/L (ref 136–145)
TRIGL SERPL-MCNC: 52 MG/DL
WBC # BLD AUTO: 8.04 THOUSAND/UL (ref 4.31–10.16)

## 2019-12-14 PROCEDURE — 80061 LIPID PANEL: CPT

## 2019-12-14 PROCEDURE — 36415 COLL VENOUS BLD VENIPUNCTURE: CPT

## 2019-12-14 PROCEDURE — 87389 HIV-1 AG W/HIV-1&-2 AB AG IA: CPT

## 2019-12-14 PROCEDURE — 80053 COMPREHEN METABOLIC PANEL: CPT

## 2019-12-14 PROCEDURE — 85025 COMPLETE CBC W/AUTO DIFF WBC: CPT

## 2019-12-14 RX ORDER — FERROUS SULFATE TAB EC 324 MG (65 MG FE EQUIVALENT) 324 (65 FE) MG
324 TABLET DELAYED RESPONSE ORAL EVERY OTHER DAY
Qty: 45 TABLET | Refills: 3 | Status: SHIPPED | OUTPATIENT
Start: 2019-12-14 | End: 2021-06-30

## 2019-12-14 NOTE — TELEPHONE ENCOUNTER
12/14/2019 16:15    I received a call from patient's daughter who had questions about the prior instructions that we gave her mother  Informed daughter that we advised patient to go to ED due to her low hemoglobin  I then asked daughter to reiterate this to patient  Patient's daughter spoke with patient inform her that she needs to come to the ED and will need transfusion  Patient adamantly refuses to go to ED, stating that she understands the risks of not doing so  Patient states that prescription for ferrous sulfate was never sent to the pharmacy  I personally reviewed the pharmacy order and there was a pharmacy receipt from [facility] on 12/04/2019 at approximately 1:00 p m  Regardless, I resent prescription to the [facility] on [address] as per patient's request   Daughter confirmed that she would go  prescription this afternoon  I then again reiterated to the daughter that patient ideally should come to ED  Patient's daughter again explained this to the patient, who again adamantly refused and expressed understanding to her daughter

## 2019-12-14 NOTE — TELEPHONE ENCOUNTER
Called patient to inform patient about critical lab value   was used  Patient had lab work done earlier this morning, with a hemoglobin of 6 9  Patient's most recent hemoglobin was 7 8 which was taken on a ED visit on December 2nd  Patient states that she is symptomatic, with dyspnea, chest pain and fatigue  Patient states that she recently had her period and states that her symptoms got worse after that  Advised patient to go to ED for further evaluation and possible transfusion  Patient expressed understanding and was in agreement with plan

## 2019-12-16 LAB — HIV 1+2 AB+HIV1 P24 AG SERPL QL IA: NORMAL

## 2020-01-08 ENCOUNTER — HOSPITAL ENCOUNTER (OUTPATIENT)
Dept: RADIOLOGY | Facility: HOSPITAL | Age: 45
Discharge: HOME | End: 2020-01-08
Payer: COMMERCIAL

## 2020-01-08 VITALS — BODY MASS INDEX: 25.83 KG/M2 | HEIGHT: 65 IN | WEIGHT: 155 LBS

## 2020-01-08 DIAGNOSIS — Z12.39 SCREENING FOR BREAST CANCER: ICD-10-CM

## 2020-01-08 PROCEDURE — 77067 SCR MAMMO BI INCL CAD: CPT

## 2020-08-19 ENCOUNTER — TELEPHONE (OUTPATIENT)
Dept: OBGYN CLINIC | Facility: CLINIC | Age: 45
End: 2020-08-19

## 2020-09-14 ENCOUNTER — TELEMEDICINE (OUTPATIENT)
Dept: INTERNAL MEDICINE CLINIC | Facility: CLINIC | Age: 45
End: 2020-09-14

## 2020-09-14 DIAGNOSIS — K92.1 BLOOD IN STOOL: ICD-10-CM

## 2020-09-14 DIAGNOSIS — R11.0 NAUSEA: ICD-10-CM

## 2020-09-14 DIAGNOSIS — R10.13 EPIGASTRIC PAIN: Primary | ICD-10-CM

## 2020-09-14 DIAGNOSIS — M54.9 MID BACK PAIN: ICD-10-CM

## 2020-09-14 DIAGNOSIS — D50.9 IRON DEFICIENCY ANEMIA, UNSPECIFIED IRON DEFICIENCY ANEMIA TYPE: ICD-10-CM

## 2020-09-14 PROCEDURE — 99214 OFFICE O/P EST MOD 30 MIN: CPT | Performed by: PHYSICIAN ASSISTANT

## 2020-09-14 NOTE — PROGRESS NOTES
Virtual Brief Visit    Assessment/Plan:    Problem List Items Addressed This Visit        Other    Iron deficiency anemia    Relevant Orders    US abdomen limited    CBC and differential    Iron, TIBC and Ferritin Panel    Occult Blood, Fecal Immunochemical    Epigastric pain - Primary    Relevant Orders    US abdomen limited    Occult Blood, Fecal Immunochemical    Mid back pain    Relevant Orders    US abdomen limited    Nausea    Relevant Orders    US abdomen limited    Blood in stool    Relevant Orders    US abdomen limited    CBC and differential    Iron, TIBC and Ferritin Panel    Occult Blood, Fecal Immunochemical            patient is a 46y/o female here for virtual visit for chronic abd pain and associated sxs as described in HPI  Differential includes gallbladder disease vs gastritis vs ulcer (w/ bleed?) vs Hpylori  I am also concerned as patient had no anemia f/u last year and when I had checked CBC in 12/2019, Hgb was < 7 and was advised by resident gabriela to go to ER and she refused  I had prescribed iron supplements prior to receiving the BW results back from that time, which she states she has been taking  I am concerned as I question possible GI ulcer w/ bleed, may need GI testing such as barium swallow vs GI referral for endoscopy  For now, US abdomen ordered and pt given # to Krysten Mendiola central scheduling  Also BW order for CBC, iron panel and occult blood stool test     I did advise pt start omeprazole 20mg OTC in case gastritis or GI ulcer  ED precautions discussed  Otherwise will call with results of tests to determine further treatment plan          Reason for visit is   Chief Complaint   Patient presents with    Abdominal Pain     going on for 5 months    Virtual Regular Visit    Virtual Brief Visit        Encounter provider Sylvia Wyman PA-C    Provider located at [address] [place] [address] [id number] [phone]    Recent Visits  No visits were found meeting these conditions  Showing recent visits within past 7 days and meeting all other requirements     Today's Visits  Date Type Provider Dept   09/14/20 [facility], [address] [place] [address] today's visits and meeting all other requirements     Future Appointments  No visits were found meeting these conditions  Showing future appointments within next 150 days and meeting all other requirements        After connecting through telephone, the patient was identified by name and date of birth  Eliceo Tracy was informed that this is a telemedicine visit and that the visit is being conducted through telephone  My office door was closed  No one else was in the room  [employer] telephone  was used for today's visit  She acknowledged consent and understanding of privacy and security of the platform  The patient has agreed to participate and understands she can discontinue the visit at any time  It was my intent to perform this visit via video technology but the patient was not able to do a video connection so the visit was completed via audio telephone only  Patient is aware this is a billable service  Subjective    Eliceo Tracy is a 39 y o  female presenting for telephone visit for GI sxs as in HPI     44y/o female here today for chronic GI sxs  She cannot get video to work, telephone encounter performed  She reports stomach pain into her back and nausea  States sxs have been for past 4-5 months, but getting worse  States worse at night and after she eats  states sxs are every time she eats  States pain is right in the middle where ribs are at  states "at mouth of stomach"  She denies vomiting or diarrhea  Denies changes to bowels such as constipation or diarrhea  she denies heartburn or acid coming up into throat       She was seen for similar pain in ED 12/2019 and workup was unremarkable, she did not appear to have any GI imaging at that time  Was dx as gastroenteritis  States her stool is very dark but not black  She states she does notice a little red blood on stool intermittent after BM  Unsure if she has hemorrhoids  She has hx of severe anemia, last hgb [id number] was < 7, pt refused to go to ED, iron supplement was sent in and we have not seen patient since that time  She states she is still taking iron supplement OTC       Past Medical History:   Diagnosis Date    Anemia        Past Surgical History:   Procedure Laterality Date    TUBAL LIGATION         Current Outpatient Medications   Medication Sig Dispense Refill    ferrous sulfate 324 (65 Fe) mg Take 1 tablet (324 mg total) by mouth every other day 45 tablet 3    Levonorgest-Eth Estrad 91-Day 0 15-0 03 &0 01 MG TABS Take 1 tablet by mouth daily 91 each 3     No current facility-administered medications for this visit  No Known Allergies    Review of Systems   Constitutional: Negative  Negative for chills, diaphoresis, fatigue and fever  Respiratory: Negative  Cardiovascular: Negative  Gastrointestinal:        As in HPI   Genitourinary: Negative  Musculoskeletal: Negative  Skin: Negative  Neurological: Negative  There were no vitals filed for this visit  UNABLE TO PERFORM EXAM AS THIS WAS LIMITED DUE TO TELEPHONE VISIT    I spent 20 minutes directly with the patient during this visit    VIRTUAL VISIT DISCLAIMER    David Luna acknowledges that she has consented to an online visit or consultation  She understands that the online visit is based solely on information provided by her, and that, in the absence of a face-to-face physical evaluation by the physician, the diagnosis she receives is both limited and provisional in terms of accuracy and completeness  This is not intended to replace a full medical face-to-face evaluation by the physician  Luz Maria Ward understands and accepts these terms      PHQ-9 Depression Screening    PHQ-9:    Frequency of the following problems over the past two weeks:       Little interest or pleasure in doing things:  0 - not at all  Feeling down, depressed, or hopeless:  0 - not at all  PHQ-2 Score:  0

## 2020-09-14 NOTE — PROGRESS NOTES
Virtual Regular Visit      Assessment/Plan:    Problem List Items Addressed This Visit     None               Reason for visit is   Chief Complaint   Patient presents with    Abdominal Pain     going on for 5 months    Virtual Regular Visit        Encounter provider Linda Shah PA-C    Provider located at [address]  [address]  [facility] [address] [phone]      Recent Visits  No visits were found meeting these conditions  Showing recent visits within past 7 days and meeting all other requirements     Today's Visits  Date Type Provider Dept   20 207 [facility], [address] [place] [address] today's visits and meeting all other requirements     Future Appointments  No visits were found meeting these conditions  Showing future appointments within next 150 days and meeting all other requirements        The patient was identified by name and date of birth  Emilia Rowan was informed that this is a telemedicine visit and that the visit is being conducted through {AMB CORONAVIRUS VISIT NQMD}  {Telemedicine confidentiality :71028} {Telemedicine participants:83623}  She acknowledged consent and understanding of privacy and security of the video platform  The patient has agreed to participate and understands they can discontinue the visit at any time  Patient is aware this is a billable service  Subjective  Emilia Rowan is a 39 y o  female ***         HPI     Past Medical History:   Diagnosis Date    Anemia        Past Surgical History:   Procedure Laterality Date    TUBAL LIGATION         Current Outpatient Medications   Medication Sig Dispense Refill    Levonorgest-Eth Estrad -Day 0 15-0 03 &0 01 MG TABS Take 1 tablet by mouth daily 91 each 3    dicyclomine (BENTYL) 20 mg tablet Take 1 tablet (20 mg total) by mouth 2 (two) times a day (Patient not taking: Reported on 9/14/2020) 20 tablet 0    ferrous sulfate 324 (65 Fe) mg Take 1 tablet (324 mg total) by mouth every other day (Patient not taking: Reported on 9/14/2020) 45 tablet 3    meloxicam (MOBIC) 15 mg tablet Take 1 tablet (15 mg total) by mouth daily for 14 days (Patient not taking: Reported on 9/14/2020) 14 tablet 0    metoclopramide (REGLAN) 10 mg tablet Take 1 tablet (10 mg total) by mouth every 6 (six) hours as needed (Nausea and vomiting) (Patient not taking: Reported on 9/14/2020) 30 tablet 0     No current facility-administered medications for this visit  No Known Allergies    Review of Systems    Video Exam    There were no vitals filed for this visit  Physical Exam     {covid time spent:92786}      VIRTUAL VISIT DISCLAIMER    Gato Callahan acknowledges that she has consented to an online visit or consultation  She understands that the online visit is based solely on information provided by her, and that, in the absence of a face-to-face physical evaluation by the physician, the diagnosis she receives is both limited and provisional in terms of accuracy and completeness  This is not intended to replace a full medical face-to-face evaluation by the physician  Gato Callahan understands and accepts these terms

## 2020-09-19 ENCOUNTER — TELEPHONE (OUTPATIENT)
Dept: OTHER | Facility: OTHER | Age: 45
End: 2020-09-19

## 2020-09-19 ENCOUNTER — APPOINTMENT (OUTPATIENT)
Dept: LAB | Facility: HOSPITAL | Age: 45
End: 2020-09-19

## 2020-09-19 DIAGNOSIS — D50.9 IRON DEFICIENCY ANEMIA, UNSPECIFIED IRON DEFICIENCY ANEMIA TYPE: ICD-10-CM

## 2020-09-19 DIAGNOSIS — R10.13 EPIGASTRIC PAIN: ICD-10-CM

## 2020-09-19 DIAGNOSIS — K92.1 BLOOD IN STOOL: ICD-10-CM

## 2020-09-19 LAB
FERRITIN SERPL-MCNC: 2 NG/ML (ref 8–388)
HEMOCCULT STL QL IA: POSITIVE
IRON SATN MFR SERPL: 3 %
IRON SERPL-MCNC: 11 UG/DL (ref 50–170)
TIBC SERPL-MCNC: 396 UG/DL (ref 250–450)

## 2020-09-19 PROCEDURE — G0328 FECAL BLOOD SCRN IMMUNOASSAY: HCPCS

## 2020-09-19 PROCEDURE — 85025 COMPLETE CBC W/AUTO DIFF WBC: CPT

## 2020-09-19 PROCEDURE — 83540 ASSAY OF IRON: CPT

## 2020-09-19 PROCEDURE — 36415 COLL VENOUS BLD VENIPUNCTURE: CPT

## 2020-09-19 PROCEDURE — 83550 IRON BINDING TEST: CPT

## 2020-09-19 PROCEDURE — 82728 ASSAY OF FERRITIN: CPT

## 2020-09-20 ENCOUNTER — TELEPHONE (OUTPATIENT)
Dept: OTHER | Facility: OTHER | Age: 45
End: 2020-09-20

## 2020-09-20 ENCOUNTER — TELEPHONE (OUTPATIENT)
Dept: INTERNAL MEDICINE CLINIC | Age: 45
End: 2020-09-20

## 2020-09-20 ENCOUNTER — TELEPHONE (OUTPATIENT)
Dept: OTHER | Facility: HOSPITAL | Age: 45
End: 2020-09-20

## 2020-09-20 ENCOUNTER — HOSPITAL ENCOUNTER (INPATIENT)
Facility: HOSPITAL | Age: 45
LOS: 2 days | Discharge: HOME | DRG: 952 | End: 2020-09-22
Attending: EMERGENCY MEDICINE | Admitting: INTERNAL MEDICINE
Payer: COMMERCIAL

## 2020-09-20 DIAGNOSIS — N92.0 MENORRHAGIA WITH REGULAR CYCLE: ICD-10-CM

## 2020-09-20 DIAGNOSIS — R10.13 EPIGASTRIC PAIN: ICD-10-CM

## 2020-09-20 DIAGNOSIS — K92.1 BLOOD IN STOOL: ICD-10-CM

## 2020-09-20 DIAGNOSIS — D64.9 SYMPTOMATIC ANEMIA: Primary | ICD-10-CM

## 2020-09-20 DIAGNOSIS — R10.9 ABDOMINAL PAIN, UNSPECIFIED ABDOMINAL LOCATION: ICD-10-CM

## 2020-09-20 DIAGNOSIS — D50.9 IRON DEFICIENCY ANEMIA, UNSPECIFIED IRON DEFICIENCY ANEMIA TYPE: ICD-10-CM

## 2020-09-20 LAB
ALBUMIN SERPL BCP-MCNC: 3.8 G/DL (ref 3.5–5)
ALP SERPL-CCNC: 55 U/L (ref 46–116)
ALT SERPL W P-5'-P-CCNC: 15 U/L (ref 12–78)
ANION GAP SERPL CALCULATED.3IONS-SCNC: 4 MMOL/L (ref 4–13)
AST SERPL W P-5'-P-CCNC: 11 U/L (ref 5–45)
BASOPHILS # BLD AUTO: 0.05 THOUSANDS/ΜL (ref 0–0.1)
BASOPHILS # BLD AUTO: 0.05 THOUSANDS/ΜL (ref 0–0.1)
BASOPHILS NFR BLD AUTO: 1 % (ref 0–1)
BASOPHILS NFR BLD AUTO: 1 % (ref 0–1)
BILIRUB SERPL-MCNC: 0.42 MG/DL (ref 0.2–1)
BUN SERPL-MCNC: 9 MG/DL (ref 5–25)
CALCIUM SERPL-MCNC: 8.7 MG/DL (ref 8.3–10.1)
CHLORIDE SERPL-SCNC: 110 MMOL/L (ref 100–108)
CO2 SERPL-SCNC: 27 MMOL/L (ref 21–32)
CREAT SERPL-MCNC: 0.54 MG/DL (ref 0.6–1.3)
EOSINOPHIL # BLD AUTO: 0.13 THOUSAND/ΜL (ref 0–0.61)
EOSINOPHIL # BLD AUTO: 0.13 THOUSAND/ΜL (ref 0–0.61)
EOSINOPHIL NFR BLD AUTO: 2 % (ref 0–6)
EOSINOPHIL NFR BLD AUTO: 3 % (ref 0–6)
ERYTHROCYTE [DISTWIDTH] IN BLOOD BY AUTOMATED COUNT: 22.7 % (ref 11.6–15.1)
ERYTHROCYTE [DISTWIDTH] IN BLOOD BY AUTOMATED COUNT: 22.8 % (ref 11.6–15.1)
GFR SERPL CREATININE-BSD FRML MDRD: 114 ML/MIN/1.73SQ M
GLUCOSE SERPL-MCNC: 110 MG/DL (ref 65–140)
HCT VFR BLD AUTO: 25.6 % (ref 34.8–46.1)
HCT VFR BLD AUTO: 26.6 % (ref 34.8–46.1)
HGB BLD-MCNC: 6.6 G/DL (ref 11.5–15.4)
HGB BLD-MCNC: 6.8 G/DL (ref 11.5–15.4)
IMM GRANULOCYTES # BLD AUTO: 0.03 THOUSAND/UL (ref 0–0.2)
IMM GRANULOCYTES # BLD AUTO: 0.03 THOUSAND/UL (ref 0–0.2)
IMM GRANULOCYTES NFR BLD AUTO: 0 % (ref 0–2)
IMM GRANULOCYTES NFR BLD AUTO: 0 % (ref 0–2)
LIPASE SERPL-CCNC: 106 U/L (ref 73–393)
LYMPHOCYTES # BLD AUTO: 1.5 THOUSANDS/ΜL (ref 0.6–4.47)
LYMPHOCYTES # BLD AUTO: 1.53 THOUSANDS/ΜL (ref 0.6–4.47)
LYMPHOCYTES NFR BLD AUTO: 18 % (ref 14–44)
LYMPHOCYTES NFR BLD AUTO: 27 % (ref 14–44)
MCH RBC QN AUTO: 16.1 PG (ref 26.8–34.3)
MCH RBC QN AUTO: 16.2 PG (ref 26.8–34.3)
MCHC RBC AUTO-ENTMCNC: 25.6 G/DL (ref 31.4–37.4)
MCHC RBC AUTO-ENTMCNC: 25.8 G/DL (ref 31.4–37.4)
MCV RBC AUTO: 63 FL (ref 82–98)
MCV RBC AUTO: 63 FL (ref 82–98)
MONOCYTES # BLD AUTO: 0.56 THOUSAND/ΜL (ref 0.17–1.22)
MONOCYTES # BLD AUTO: 0.67 THOUSAND/ΜL (ref 0.17–1.22)
MONOCYTES NFR BLD AUTO: 8 % (ref 4–12)
MONOCYTES NFR BLD AUTO: 9 % (ref 4–12)
NEUTROPHILS # BLD AUTO: 3.52 THOUSANDS/ΜL (ref 1.85–7.62)
NEUTROPHILS # BLD AUTO: 5.99 THOUSANDS/ΜL (ref 1.85–7.62)
NEUTS SEG NFR BLD AUTO: 60 % (ref 43–75)
NEUTS SEG NFR BLD AUTO: 71 % (ref 43–75)
NRBC BLD AUTO-RTO: 0 /100 WBCS
NRBC BLD AUTO-RTO: 0 /100 WBCS
PLATELET # BLD AUTO: 229 THOUSANDS/UL (ref 149–390)
PLATELET # BLD AUTO: 289 THOUSANDS/UL (ref 149–390)
POTASSIUM SERPL-SCNC: 3.4 MMOL/L (ref 3.5–5.3)
PROT SERPL-MCNC: 7.6 G/DL (ref 6.4–8.2)
RBC # BLD AUTO: 4.08 MILLION/UL (ref 3.81–5.12)
RBC # BLD AUTO: 4.23 MILLION/UL (ref 3.81–5.12)
SODIUM SERPL-SCNC: 141 MMOL/L (ref 136–145)
WBC # BLD AUTO: 5.79 THOUSAND/UL (ref 4.31–10.16)
WBC # BLD AUTO: 8.4 THOUSAND/UL (ref 4.31–10.16)

## 2020-09-20 PROCEDURE — 99284 EMERGENCY DEPT VISIT MOD MDM: CPT

## 2020-09-20 PROCEDURE — C9113 INJ PANTOPRAZOLE SODIUM, VIA: HCPCS | Performed by: STUDENT IN AN ORGANIZED HEALTH CARE EDUCATION/TRAINING PROGRAM

## 2020-09-20 PROCEDURE — 99285 EMERGENCY DEPT VISIT HI MDM: CPT | Performed by: EMERGENCY MEDICINE

## 2020-09-20 PROCEDURE — 96365 THER/PROPH/DIAG IV INF INIT: CPT

## 2020-09-20 PROCEDURE — 80053 COMPREHEN METABOLIC PANEL: CPT | Performed by: EMERGENCY MEDICINE

## 2020-09-20 PROCEDURE — 83690 ASSAY OF LIPASE: CPT | Performed by: EMERGENCY MEDICINE

## 2020-09-20 PROCEDURE — 36415 COLL VENOUS BLD VENIPUNCTURE: CPT | Performed by: EMERGENCY MEDICINE

## 2020-09-20 PROCEDURE — 85025 COMPLETE CBC W/AUTO DIFF WBC: CPT | Performed by: EMERGENCY MEDICINE

## 2020-09-20 RX ORDER — ONDANSETRON 2 MG/ML
4 INJECTION INTRAMUSCULAR; INTRAVENOUS EVERY 6 HOURS PRN
Status: DISCONTINUED | OUTPATIENT
Start: 2020-09-20 | End: 2020-09-22 | Stop reason: HOSPADM

## 2020-09-20 RX ORDER — ACETAMINOPHEN 325 MG/1
650 TABLET ORAL EVERY 6 HOURS PRN
Status: DISCONTINUED | OUTPATIENT
Start: 2020-09-20 | End: 2020-09-21

## 2020-09-20 RX ORDER — SODIUM CHLORIDE, SODIUM GLUCONATE, SODIUM ACETATE, POTASSIUM CHLORIDE, MAGNESIUM CHLORIDE, SODIUM PHOSPHATE, DIBASIC, AND POTASSIUM PHOSPHATE .53; .5; .37; .037; .03; .012; .00082 G/100ML; G/100ML; G/100ML; G/100ML; G/100ML; G/100ML; G/100ML
125 INJECTION, SOLUTION INTRAVENOUS CONTINUOUS
Status: DISCONTINUED | OUTPATIENT
Start: 2020-09-20 | End: 2020-09-22 | Stop reason: HOSPADM

## 2020-09-20 RX ORDER — POTASSIUM CHLORIDE 20 MEQ/1
20 TABLET, EXTENDED RELEASE ORAL ONCE
Status: COMPLETED | OUTPATIENT
Start: 2020-09-20 | End: 2020-09-20

## 2020-09-20 RX ORDER — SENNOSIDES 8.6 MG
1 TABLET ORAL DAILY
Status: DISCONTINUED | OUTPATIENT
Start: 2020-09-21 | End: 2020-09-22 | Stop reason: HOSPADM

## 2020-09-20 RX ORDER — PANTOPRAZOLE SODIUM 40 MG/1
40 INJECTION, POWDER, FOR SOLUTION INTRAVENOUS EVERY 12 HOURS SCHEDULED
Status: DISCONTINUED | OUTPATIENT
Start: 2020-09-20 | End: 2020-09-20

## 2020-09-20 RX ORDER — POLYETHYLENE GLYCOL 3350 17 G/17G
17 POWDER, FOR SOLUTION ORAL DAILY
Status: DISCONTINUED | OUTPATIENT
Start: 2020-09-21 | End: 2020-09-22 | Stop reason: HOSPADM

## 2020-09-20 RX ORDER — PANTOPRAZOLE SODIUM 40 MG/1
40 INJECTION, POWDER, FOR SOLUTION INTRAVENOUS EVERY 12 HOURS SCHEDULED
Status: DISCONTINUED | OUTPATIENT
Start: 2020-09-21 | End: 2020-09-22 | Stop reason: HOSPADM

## 2020-09-20 RX ORDER — POTASSIUM CHLORIDE 20 MEQ/1
40 TABLET, EXTENDED RELEASE ORAL ONCE
Status: COMPLETED | OUTPATIENT
Start: 2020-09-20 | End: 2020-09-20

## 2020-09-20 RX ORDER — FERROUS SULFATE 325(65) MG
325 TABLET ORAL
Status: DISCONTINUED | OUTPATIENT
Start: 2020-09-21 | End: 2020-09-20

## 2020-09-20 RX ADMIN — IRON SUCROSE 300 MG: 20 INJECTION, SOLUTION INTRAVENOUS at 18:11

## 2020-09-20 RX ADMIN — POTASSIUM CHLORIDE 20 MEQ: 1500 TABLET, EXTENDED RELEASE ORAL at 20:14

## 2020-09-20 RX ADMIN — SODIUM CHLORIDE, SODIUM GLUCONATE, SODIUM ACETATE, POTASSIUM CHLORIDE, MAGNESIUM CHLORIDE, SODIUM PHOSPHATE, DIBASIC, AND POTASSIUM PHOSPHATE 125 ML/HR: .53; .5; .37; .037; .03; .012; .00082 INJECTION, SOLUTION INTRAVENOUS at 20:10

## 2020-09-20 RX ADMIN — POTASSIUM CHLORIDE 40 MEQ: 1500 TABLET, EXTENDED RELEASE ORAL at 21:37

## 2020-09-20 RX ADMIN — ACETAMINOPHEN 325 MG: 325 TABLET, FILM COATED ORAL at 20:14

## 2020-09-20 RX ADMIN — SODIUM CHLORIDE 80 MG: 9 INJECTION, SOLUTION INTRAVENOUS at 22:20

## 2020-09-20 NOTE — TELEPHONE ENCOUNTER
I was notified regarding pt's critical lab value: Hgb of 6 8  Pt has hx of DYLAN  Prior Hgb of 6 9 in 12/2019  Pt was directly contacted by on call [facility] admitting resident, Dr Justo Wallis regarding this and advised pt to seek medical attention at nearest ED  Pt initially responded that he would consider it after further review with family  On further chart review, it appears that pt presented at [facility] ED today 9/20/20 for further evaluation

## 2020-09-20 NOTE — TELEPHONE ENCOUNTER
Teto text from Dr Herrera November, im currently off duty, parminder spoke to the on call provider regarding this inquiry and Dr Adela Campbell has already spoken to pt and advised to come to ED, pt is considering it with her daughter  Yasmeen Mann i asked him to reach out to  or another  staff member to update u on this       My apologies Dr Adela Campbell is not listed on AMION and you are the next option  Provider needs to contact [facility] since they are the ones trying to connect with him  Thanks!

## 2020-09-20 NOTE — LETTER
To whom it may concern,    Vikash Mcmillan has been at [facility] to help care for her mother who has been a patient here  Her mother has been under my direct care  Angie Her has been caring for her mother at the hospital from 9/20/2020-9/22/2020  She can return to work tomorrow, 9/23/2020  If you have any questions, please feel free to reach out      Sincerely,  Debra Estevez MD

## 2020-09-20 NOTE — TELEPHONE ENCOUNTER
Patient called and informed about her severe Iron deficiency anemia  FOBT positive result  She is currently in her menstrual cycle and has a history of menorrhagia with severe DYLAN refused blood transfusion  She was advised to come to [facility] ED for evaluation and blood transfusion  Patient  and her daughter would think about it

## 2020-09-20 NOTE — LETTER
[address] [facility] [address]  [address]  [place] [address]  Dept: [phone]    September 22, 2020     Patient: Pat Malagon   YOB: 1975   Date of Visit: 9/20/2020       To Whom it May Concern:    Pat Malagon is under my professional care  She was seen in the hospital from 9/20/2020   to 09/22/20  She may return to school on 9/24/2020 without limitations  If you have any questions or concerns, please don't hesitate to call           Sincerely,          Rafa Hinton MD

## 2020-09-20 NOTE — ED ATTENDING ATTESTATION
9/20/2020  I, Kisha Uribe MD, saw and evaluated the patient  I have discussed the patient with the resident/non-physician practitioner and agree with the resident's/non-physician practitioner's findings, Plan of Care, and MDM as documented in the resident's/non-physician practitioner's note, except where noted  All available labs and Radiology studies were reviewed  I was present for key portions of any procedure(s) performed by the resident/non-physician practitioner and I was immediately available to provide assistance  At this point I agree with the current assessment done in the Emergency Department  I have conducted an independent evaluation of this patient a history and physical is as follows: This a 51-year-old woman who presents to the emergency department for abnormal blood test   Patient had an outpatient hemoglobin checked yesterday which was less than 7 and was sent in  patient states that she has been having dyspnea on exertion  Patient states that she is globally weak as well  Patient had a positive fecal occult blood test as an outpatient  She has been being worked up for upper abdominal pain which has been longstanding, but has never had upper endoscopy or colonoscopy  Patient declines any transfusion  Review of systems otherwise negative in 12 systems reviewed  On exam the patient is pale  She is hemodynamically stable  Vital signs were reviewed  Patient is awake, alert, interactive  The patient's pupils are equally round reactive to light  Oropharynx is clear with moist mucous membranes  Neck is supple and nontender with no adenopathy or JVD  Heart is regular with no murmurs, rubs, or gallops  Lungs are clear and equal with no wheezes, rales, or rhonchi  Abdomen is soft and nontender with no masses, rebound, or guarding  There is no CVA tenderness  The patient was completely exposed  There is no skin breakdown  There are no rashes or skin changes    Extremities are warm and pale with good pulses  The patient has normal strength, sensation, and cranial nerves  Impression:  Anemia, epigastric pain, heme-positive stools  Patient may benefit from upper and lower endoscopy given that she is ongoing abdominal pain as well as heme-positive stools  Will plan to admit for symptomatic anemia and further evaluation for source of anemia    ED Course         Critical Care Time  Procedures

## 2020-09-20 NOTE — TELEPHONE ENCOUNTER
[facility]/Pt is Alessandro Renee  75/Pt has Critical lab results    Zoila Leonardo was contacted at [phone]    Please give us a call back if the provider does not get back to you in 20-30 mins

## 2020-09-20 NOTE — ED PROVIDER NOTES
History  Chief Complaint   Patient presents with    Abnormal Lab     Pt  reports Dr  sent her in for iron infusion  Has heavy menstrual periods, has been trying to find out for years why  Was told her Hgb was under 7  Pt  refusing blood transfusion  51-year-old female with a past medical history of anemia, menorrhagia, who presents for normal lab result of hemoglobin of 6 9  Other labs demonstrated iron deficiency anemia, with an iron of 11 and ferritin of 2, and a positive FOBT  She was referred here by her doctor for iron infusion  Patient describes that she was being worked up for this as possibly caused by her heavy periods, and has had low hemoglobins in the past, last year had a hemoglobin of 6 9 as well  She refused red blood cell transfusion at this time  She does describe feeling symptomatic from this anemia, reporting shortness of breath with walking as far as a block  She does report occasional headaches and nausea  She does report epigastric pain that started approximately 4-5 months ago, worse when she eats or lies down  She has never received an upper endoscopy in the past   ROS otherwise negative  Prior to Admission Medications   Prescriptions Last Dose Informant Patient Reported?  Taking?   ferrous sulfate 324 (65 Fe) mg   No Yes   Sig: Take 1 tablet (324 mg total) by mouth every other day      Facility-Administered Medications: None       Past Medical History:   Diagnosis Date    Anemia        Past Surgical History:   Procedure Laterality Date    TUBAL LIGATION         Family History   Problem Relation Age of Onset    Hypertension Mother     Stroke Mother     No Known Problems Father     Hypertension Maternal Grandmother     Stroke Maternal Grandmother     Hypertension Maternal Uncle     Stroke Maternal Uncle     Stroke Paternal Aunt     No Known Problems Sister     HIV Brother     No Known Problems Daughter     No Known Problems Son     No Known Problems Maternal Grandfather     No Known Problems Paternal Grandmother     No Known Problems Paternal Grandfather     No Known Problems Sister     No Known Problems Daughter      I have reviewed and agree with the history as documented  E-Cigarette/Vaping    E-Cigarette Use Never User      E-Cigarette/Vaping Substances     Social History     Tobacco Use    Smoking status: Never Smoker    Smokeless tobacco: Never Used   Substance Use Topics    Alcohol use: Never     Frequency: Never    Drug use: Never        Review of Systems   Constitutional: Negative for chills and fever  HENT: Negative for congestion, rhinorrhea and sore throat  Respiratory: Positive for shortness of breath  Negative for cough  Cardiovascular: Negative for chest pain and palpitations  Gastrointestinal: Positive for abdominal pain and nausea  Negative for constipation, diarrhea and vomiting  Genitourinary: Negative for difficulty urinating and flank pain  Musculoskeletal: Negative for arthralgias  Neurological: Positive for headaches  Negative for dizziness, weakness and light-headedness  Psychiatric/Behavioral: Negative for agitation, behavioral problems and confusion  All other systems reviewed and are negative  Physical Exam  ED Triage Vitals [09/20/20 1617]   Temperature Pulse Respirations Blood Pressure SpO2   98 6 °F (37 °C) 82 18 159/84 100 %      Temp Source Heart Rate Source Patient Position - Orthostatic VS BP Location FiO2 (%)   Oral Monitor Sitting Right arm --      Pain Score       6             Orthostatic Vital Signs  Vitals:    09/20/20 1803 09/20/20 1900 09/20/20 1951 09/20/20 2147   BP: 131/65 152/70 159/79 132/66   Pulse: 74 76 77 68   Patient Position - Orthostatic VS:  Sitting         Physical Exam  Constitutional:       Appearance: She is well-developed  HENT:      Head: Normocephalic and atraumatic  Neck:      Musculoskeletal: Normal range of motion and neck supple     Cardiovascular:      Rate and Rhythm: Normal rate and regular rhythm  Heart sounds: Normal heart sounds  No murmur  No friction rub  Pulmonary:      Effort: Pulmonary effort is normal  No respiratory distress  Breath sounds: Normal breath sounds  No wheezing or rales  Abdominal:      General: Bowel sounds are normal  There is no distension  Palpations: Abdomen is soft  Tenderness: There is abdominal tenderness  Comments: Epigastric tenderness   Genitourinary:     Rectum: Guaiac result positive  Comments: Positive FOBT, however, patient is on her period and has residual blood around the genital region  No melena appreciated  Musculoskeletal: Normal range of motion  Skin:     General: Skin is warm  Neurological:      Mental Status: She is alert and oriented to person, place, and time  Coordination: Coordination normal    Psychiatric:         Behavior: Behavior normal          Thought Content:  Thought content normal          Judgment: Judgment normal          ED Medications  Medications   ondansetron (ZOFRAN) injection 4 mg (has no administration in time range)   senna (SENOKOT) tablet 8 6 mg (has no administration in time range)   polyethylene glycol (MIRALAX) packet 17 g (has no administration in time range)   acetaminophen (TYLENOL) tablet 650 mg (325 mg Oral Given 9/20/20 2014)   multi-electrolyte (PLASMALYTE-A/ISOLYTE-S PH 7 4) IV solution (125 mL/hr Intravenous New Bag 9/20/20 2010)   pantoprazole (PROTONIX) injection 40 mg (has no administration in time range)   iron sucrose (VENOFER) 300 mg in sodium chloride 0 9 % 250 mL IVPB (0 mg Intravenous Stopped 9/20/20 2010)   potassium chloride (K-DUR,KLOR-CON) CR tablet 20 mEq (20 mEq Oral Given 9/20/20 2014)   pantoprazole (PROTONIX) 80 mg in sodium chloride 0 9 % 100 mL IVPB (80 mg Intravenous New Bag 9/20/20 2220)   potassium chloride (K-DUR,KLOR-CON) CR tablet 40 mEq (40 mEq Oral Given 9/20/20 2137)       Diagnostic Studies  Results Reviewed Procedure Component Value Units Date/Time    CBC and differential [[id number]]  (Abnormal) Collected:  09/20/20 1700    Lab Status:  Final result Specimen:  Blood from Arm, Left Updated:  09/20/20 1733     WBC 8 40 Thousand/uL      RBC 4 08 Million/uL      Hemoglobin 6 6 g/dL      Hematocrit 25 6 %      MCV 63 fL      MCH 16 2 pg      MCHC 25 8 g/dL      RDW 22 8 %      Platelets 395 Thousands/uL      nRBC 0 /100 WBCs      Neutrophils Relative 71 %      Immat GRANS % 0 %      Lymphocytes Relative 18 %      Monocytes Relative 8 %      Eosinophils Relative 2 %      Basophils Relative 1 %      Neutrophils Absolute 5 99 Thousands/µL      Immature Grans Absolute 0 03 Thousand/uL      Lymphocytes Absolute 1 53 Thousands/µL      Monocytes Absolute 0 67 Thousand/µL      Eosinophils Absolute 0 13 Thousand/µL      Basophils Absolute 0 05 Thousands/µL     Comprehensive metabolic panel [[id number]]  (Abnormal) Collected:  09/20/20 1627    Lab Status:  Final result Specimen:  Blood Updated:  09/20/20 1716     Sodium 141 mmol/L      Potassium 3 4 mmol/L      Chloride 110 mmol/L      CO2 27 mmol/L      ANION GAP 4 mmol/L      BUN 9 mg/dL      Creatinine 0 54 mg/dL      Glucose 110 mg/dL      Calcium 8 7 mg/dL      AST 11 U/L      ALT 15 U/L      Alkaline Phosphatase 55 U/L      Total Protein 7 6 g/dL      Albumin 3 8 g/dL      Total Bilirubin 0 42 mg/dL      eGFR 114 ml/min/1 73sq m     Narrative:       [place] guidelines for Chronic Kidney Disease (CKD):     Stage 1 with normal or high GFR (GFR > 90 mL/min/1 73 square meters)    Stage 2 Mild CKD (GFR = 60-89 mL/min/1 73 square meters)    Stage 3A Moderate CKD (GFR = 45-59 mL/min/1 73 square meters)    Stage 3B Moderate CKD (GFR = 30-44 mL/min/1 73 square meters)    Stage 4 Severe CKD (GFR = 15-29 mL/min/1 73 square meters)    Stage 5 End Stage CKD (GFR <15 mL/min/1 73 square meters)  Note: GFR calculation is accurate only with a steady state creatinine    Lipase [[id number]]  (Normal) Collected:  09/20/20 1627    Lab Status:  Final result Specimen:  Blood Updated:  09/20/20 1716     Lipase 106 u/L                  No orders to display         Procedures  Procedures      ED Course  ED Course as of Sep 20 2334   Sun Sep 20, 2020   1743 Hemoglobin(!!): 6 6   1743 Potassium(!): 3 4   1744 Per hematology, will give 300 mg venofer  May take PO iron once a day after  SBIRT 20yo+      Most Recent Value   SBIRT (24 yo +)   In order to provide better care to our patients, we are screening all of our patients for alcohol and drug use  Would it be okay to ask you these screening questions? Unable to answer at this time Filed at: 09/20/2020 1621                  MDM  Number of Diagnoses or Management Options  Symptomatic anemia:   Diagnosis management comments: Patient's presentation is concerning for symptomatic anemia secondary to either upper GI bleed given her symptoms or uterine bleeding with her history of menorrhagia  Hgb in department at 6 6  Per Hematology, patient given 300 mg venofer  Patient admitted to SOD for further evaluation of her anemia  Disposition  Final diagnoses:   Symptomatic anemia     Time reflects when diagnosis was documented in both MDM as applicable and the Disposition within this note     Time User Action Codes Description Comment    9/20/2020  6:41 PM Marshal Pulse Add [D64 9] Symptomatic anemia     9/20/2020  6:47 PM Elisa Gauss Add [K92 1] Blood in stool     9/20/2020  9:11 PM Anthony Saharinir Add [N92 0] Menorrhagia with regular cycle       ED Disposition     ED Disposition Condition Date/Time Comment    Admit Stable [place] Sep 20, 2020  6:43 PM Case was discussed with SOD and the patient's admission status was agreed to be Admission Status: inpatient status to the service of Dr Eri Contreras          Follow-up Information    None         Current Discharge Medication List      CONTINUE these medications which have NOT CHANGED    Details   ferrous sulfate 324 (65 Fe) mg Take 1 tablet (324 mg total) by mouth every other day  Qty: 45 tablet, Refills: 3    Associated Diagnoses: Menorrhagia with regular cycle           No discharge procedures on file  PDMP Review     None           ED Provider  Attending physically available and evaluated Angel Robin I managed the patient along with the ED Attending      Electronically Signed by         Marylee Barrier, MD  09/20/20 9478

## 2020-09-20 NOTE — TELEPHONE ENCOUNTER
[phone] / Shayy Rivas / Jr Current / pt Andres Juares / [birth year] / critical lab values / lab has been trying to get in contact with office since yesterday  Please call back       Teto texted Dr Khai Cabrera

## 2020-09-21 ENCOUNTER — APPOINTMENT (INPATIENT)
Dept: RADIOLOGY | Facility: HOSPITAL | Age: 45
DRG: 952 | End: 2020-09-21
Payer: COMMERCIAL

## 2020-09-21 PROBLEM — E87.6 HYPOKALEMIA: Status: ACTIVE | Noted: 2020-09-21

## 2020-09-21 LAB
ABO GROUP BLD: NORMAL
ANION GAP SERPL CALCULATED.3IONS-SCNC: 4 MMOL/L (ref 4–13)
BLD GP AB SCN SERPL QL: NEGATIVE
BUN SERPL-MCNC: 6 MG/DL (ref 5–25)
CALCIUM SERPL-MCNC: 8.4 MG/DL (ref 8.3–10.1)
CHLORIDE SERPL-SCNC: 112 MMOL/L (ref 100–108)
CO2 SERPL-SCNC: 26 MMOL/L (ref 21–32)
CREAT SERPL-MCNC: 0.43 MG/DL (ref 0.6–1.3)
ERYTHROCYTE [DISTWIDTH] IN BLOOD BY AUTOMATED COUNT: 22.6 % (ref 11.6–15.1)
GFR SERPL CREATININE-BSD FRML MDRD: 123 ML/MIN/1.73SQ M
GLUCOSE SERPL-MCNC: 84 MG/DL (ref 65–140)
HCT VFR BLD AUTO: 24 % (ref 34.8–46.1)
HCT VFR BLD AUTO: 25.8 % (ref 34.8–46.1)
HGB BLD-MCNC: 6 G/DL (ref 11.5–15.4)
HGB BLD-MCNC: 7.1 G/DL (ref 11.5–15.4)
MCH RBC QN AUTO: 16 PG (ref 26.8–34.3)
MCHC RBC AUTO-ENTMCNC: 25 G/DL (ref 31.4–37.4)
MCV RBC AUTO: 64 FL (ref 82–98)
PLATELET # BLD AUTO: 258 THOUSANDS/UL (ref 149–390)
POTASSIUM SERPL-SCNC: 4 MMOL/L (ref 3.5–5.3)
RBC # BLD AUTO: 3.74 MILLION/UL (ref 3.81–5.12)
RH BLD: POSITIVE
SODIUM SERPL-SCNC: 142 MMOL/L (ref 136–145)
SPECIMEN EXPIRATION DATE: NORMAL
WBC # BLD AUTO: 6.2 THOUSAND/UL (ref 4.31–10.16)

## 2020-09-21 PROCEDURE — 58100 BIOPSY OF UTERUS LINING: CPT | Performed by: OBSTETRICS & GYNECOLOGY

## 2020-09-21 PROCEDURE — 30233N1 TRANSFUSION OF NONAUTOLOGOUS RED BLOOD CELLS INTO PERIPHERAL VEIN, PERCUTANEOUS APPROACH: ICD-10-PCS | Performed by: INTERNAL MEDICINE

## 2020-09-21 PROCEDURE — 88305 TISSUE EXAM BY PATHOLOGIST: CPT | Performed by: PATHOLOGY

## 2020-09-21 PROCEDURE — 0DJD8ZZ INSPECTION OF LOWER INTESTINAL TRACT, VIA NATURAL OR ARTIFICIAL OPENING ENDOSCOPIC: ICD-10-PCS | Performed by: INTERNAL MEDICINE

## 2020-09-21 PROCEDURE — 97162 PT EVAL MOD COMPLEX 30 MIN: CPT

## 2020-09-21 PROCEDURE — 99254 IP/OBS CNSLTJ NEW/EST MOD 60: CPT | Performed by: OBSTETRICS & GYNECOLOGY

## 2020-09-21 PROCEDURE — 86900 BLOOD TYPING SEROLOGIC ABO: CPT | Performed by: STUDENT IN AN ORGANIZED HEALTH CARE EDUCATION/TRAINING PROGRAM

## 2020-09-21 PROCEDURE — 0DB68ZX EXCISION OF STOMACH, VIA NATURAL OR ARTIFICIAL OPENING ENDOSCOPIC, DIAGNOSTIC: ICD-10-PCS | Performed by: INTERNAL MEDICINE

## 2020-09-21 PROCEDURE — NC001 PR NO CHARGE: Performed by: INTERNAL MEDICINE

## 2020-09-21 PROCEDURE — 76856 US EXAM PELVIC COMPLETE: CPT

## 2020-09-21 PROCEDURE — 85018 HEMOGLOBIN: CPT | Performed by: INTERNAL MEDICINE

## 2020-09-21 PROCEDURE — 86920 COMPATIBILITY TEST SPIN: CPT

## 2020-09-21 PROCEDURE — 97166 OT EVAL MOD COMPLEX 45 MIN: CPT

## 2020-09-21 PROCEDURE — 86850 RBC ANTIBODY SCREEN: CPT | Performed by: STUDENT IN AN ORGANIZED HEALTH CARE EDUCATION/TRAINING PROGRAM

## 2020-09-21 PROCEDURE — 85027 COMPLETE CBC AUTOMATED: CPT | Performed by: STUDENT IN AN ORGANIZED HEALTH CARE EDUCATION/TRAINING PROGRAM

## 2020-09-21 PROCEDURE — 85014 HEMATOCRIT: CPT | Performed by: INTERNAL MEDICINE

## 2020-09-21 PROCEDURE — 76830 TRANSVAGINAL US NON-OB: CPT

## 2020-09-21 PROCEDURE — 99222 1ST HOSP IP/OBS MODERATE 55: CPT | Performed by: INTERNAL MEDICINE

## 2020-09-21 PROCEDURE — 0DB98ZX EXCISION OF DUODENUM, VIA NATURAL OR ARTIFICIAL OPENING ENDOSCOPIC, DIAGNOSTIC: ICD-10-PCS | Performed by: INTERNAL MEDICINE

## 2020-09-21 PROCEDURE — P9016 RBC LEUKOCYTES REDUCED: HCPCS

## 2020-09-21 PROCEDURE — 99253 IP/OBS CNSLTJ NEW/EST LOW 45: CPT | Performed by: INTERNAL MEDICINE

## 2020-09-21 PROCEDURE — 99254 IP/OBS CNSLTJ NEW/EST MOD 60: CPT | Performed by: INTERNAL MEDICINE

## 2020-09-21 PROCEDURE — C9113 INJ PANTOPRAZOLE SODIUM, VIA: HCPCS | Performed by: STUDENT IN AN ORGANIZED HEALTH CARE EDUCATION/TRAINING PROGRAM

## 2020-09-21 PROCEDURE — 80048 BASIC METABOLIC PNL TOTAL CA: CPT | Performed by: STUDENT IN AN ORGANIZED HEALTH CARE EDUCATION/TRAINING PROGRAM

## 2020-09-21 PROCEDURE — 86901 BLOOD TYPING SEROLOGIC RH(D): CPT | Performed by: STUDENT IN AN ORGANIZED HEALTH CARE EDUCATION/TRAINING PROGRAM

## 2020-09-21 RX ORDER — ACETAMINOPHEN 325 MG/1
650 TABLET ORAL EVERY 6 HOURS PRN
Status: DISCONTINUED | OUTPATIENT
Start: 2020-09-21 | End: 2020-09-22 | Stop reason: HOSPADM

## 2020-09-21 RX ORDER — CYANOCOBALAMIN 1000 UG/ML
1000 INJECTION INTRAMUSCULAR; SUBCUTANEOUS ONCE
Status: COMPLETED | OUTPATIENT
Start: 2020-09-21 | End: 2020-09-21

## 2020-09-21 RX ORDER — FOLIC ACID 1 MG/1
1 TABLET ORAL DAILY
Status: DISCONTINUED | OUTPATIENT
Start: 2020-09-21 | End: 2020-09-22 | Stop reason: HOSPADM

## 2020-09-21 RX ADMIN — ACETAMINOPHEN 650 MG: 325 TABLET, FILM COATED ORAL at 15:38

## 2020-09-21 RX ADMIN — BISACODYL 10 MG: 5 TABLET, COATED ORAL at 17:12

## 2020-09-21 RX ADMIN — PANTOPRAZOLE SODIUM 40 MG: 40 INJECTION, POWDER, FOR SOLUTION INTRAVENOUS at 08:17

## 2020-09-21 RX ADMIN — FOLIC ACID 1 MG: 1 TABLET ORAL at 10:33

## 2020-09-21 RX ADMIN — CYANOCOBALAMIN 1000 MCG: 1000 INJECTION, SOLUTION INTRAMUSCULAR at 10:33

## 2020-09-21 RX ADMIN — PANTOPRAZOLE SODIUM 40 MG: 40 INJECTION, POWDER, FOR SOLUTION INTRAVENOUS at 20:32

## 2020-09-21 RX ADMIN — TRANEXAMIC ACID 1000 MG: 1 INJECTION, SOLUTION INTRAVENOUS at 20:04

## 2020-09-21 RX ADMIN — SODIUM CHLORIDE, SODIUM GLUCONATE, SODIUM ACETATE, POTASSIUM CHLORIDE, MAGNESIUM CHLORIDE, SODIUM PHOSPHATE, DIBASIC, AND POTASSIUM PHOSPHATE 125 ML/HR: .53; .5; .37; .037; .03; .012; .00082 INJECTION, SOLUTION INTRAVENOUS at 12:33

## 2020-09-21 RX ADMIN — ACETAMINOPHEN 650 MG: 325 TABLET, FILM COATED ORAL at 08:13

## 2020-09-21 RX ADMIN — POLYETHYLENE GLYCOL 3350, SODIUM SULFATE ANHYDROUS, SODIUM BICARBONATE, SODIUM CHLORIDE, POTASSIUM CHLORIDE 4000 ML: 236; 22.74; 6.74; 5.86; 2.97 POWDER, FOR SOLUTION ORAL at 17:00

## 2020-09-21 RX ADMIN — SODIUM CHLORIDE, SODIUM GLUCONATE, SODIUM ACETATE, POTASSIUM CHLORIDE, MAGNESIUM CHLORIDE, SODIUM PHOSPHATE, DIBASIC, AND POTASSIUM PHOSPHATE 125 ML/HR: .53; .5; .37; .037; .03; .012; .00082 INJECTION, SOLUTION INTRAVENOUS at 20:36

## 2020-09-21 NOTE — PLAN OF CARE
Problem: PAIN - ADULT  Goal: Verbalizes/displays adequate comfort level or baseline comfort level  Description: Interventions:  - Encourage patient to monitor pain and request assistance  - Assess pain using appropriate pain scale  - Administer analgesics based on type and severity of pain and evaluate response  - Implement non-pharmacological measures as appropriate and evaluate response  - Consider cultural and social influences on pain and pain management  - Notify physician/advanced practitioner if interventions unsuccessful or patient reports new pain  Outcome: Not Progressing     Problem: INFECTION - ADULT  Goal: Absence or prevention of progression during hospitalization  Description: INTERVENTIONS:  - Assess and monitor for signs and symptoms of infection  - Monitor lab/diagnostic results  - Monitor all insertion sites, i e  indwelling lines, tubes, and drains  - Monitor endotracheal if appropriate and nasal secretions for changes in amount and color  - [place] appropriate cooling/warming therapies per order  - Administer medications as ordered  - Instruct and encourage patient and family to use good hand hygiene technique  - Identify and instruct in appropriate isolation precautions for identified infection/condition  Outcome: Not Progressing  Goal: Absence of fever/infection during neutropenic period  Description: INTERVENTIONS:  - Monitor WBC    Outcome: Not Progressing     Problem: SAFETY ADULT  Goal: Patient will remain free of falls  Description: INTERVENTIONS:  - Assess patient frequently for physical needs  -  Identify cognitive and physical deficits and behaviors that affect risk of falls    -  [place] fall precautions as indicated by assessment   - Educate patient/family on patient safety including physical limitations  - Instruct patient to call for assistance with activity based on assessment  - Modify environment to reduce risk of injury  - Consider OT/PT consult to assist with strengthening/mobility  Outcome: Not Progressing  Goal: Maintain or return to baseline ADL function  Description: INTERVENTIONS:  -  Assess patient's ability to carry out ADLs; assess patient's baseline for ADL function and identify physical deficits which impact ability to perform ADLs (bathing, care of mouth/teeth, toileting, grooming, dressing, etc )  - Assess/evaluate cause of self-care deficits   - Assess range of motion  - Assess patient's mobility; develop plan if impaired  - Assess patient's need for assistive devices and provide as appropriate  - Encourage maximum independence but intervene and supervise when necessary  - Involve family in performance of ADLs  - Assess for home care needs following discharge   - Consider OT consult to assist with ADL evaluation and planning for discharge  - Provide patient education as appropriate  Outcome: Not Progressing  Goal: Maintain or return mobility status to optimal level  Description: INTERVENTIONS:  - Assess patient's baseline mobility status (ambulation, transfers, stairs, etc )    - Identify cognitive and physical deficits and behaviors that affect mobility  - Identify mobility aids required to assist with transfers and/or ambulation (gait belt, sit-to-stand, lift, walker, cane, etc )  - [place] fall precautions as indicated by assessment  - Record patient progress and toleration of activity level on Mobility SBAR; progress patient to next Phase/Stage  - Instruct patient to call for assistance with activity based on assessment  - Consider rehabilitation consult to assist with strengthening/weightbearing, etc   Outcome: Not Progressing     Problem: DISCHARGE PLANNING  Goal: Discharge to home or other facility with appropriate resources  Description: INTERVENTIONS:  - Identify barriers to discharge w/patient and caregiver  - Arrange for needed discharge resources and transportation as appropriate  - Identify discharge learning needs (meds, wound care, etc )  - Arrange for interpretive services to assist at discharge as needed  - Refer to Case Management Department for coordinating discharge planning if the patient needs post-hospital services based on physician/advanced practitioner order or complex needs related to functional status, cognitive ability, or social support system  Outcome: Not Progressing     Problem: Knowledge Deficit  Goal: Patient/family/caregiver demonstrates understanding of disease process, treatment plan, medications, and discharge instructions  Description: Complete learning assessment and assess knowledge base    Interventions:  - Provide teaching at level of understanding  - Provide teaching via preferred learning methods  Outcome: Not Progressing

## 2020-09-21 NOTE — ASSESSMENT & PLAN NOTE
Hemoglobin on presentation 6 6 -> 6 0->7 1->7 8 on 9/21 AM   Recent iron studies showed ferritin of 2,Low iron and TIBC 396  Hegb up to 7 8 after 1u pRBC on 7/21  FOBT positive  Current started on her menstrual cycle  Received Venofer x1 on 9/20  Plan :  Gastroenterology and Ob Gyn Consult    See Symptomatic anemia problem for plan

## 2020-09-21 NOTE — CONSULTS
Consultation - [facility]  Emilia Harpal 39 y o  female MRN: [id number]  [facility] Encounter: [id number]    Reason for Consult / Principal Problem:     Symptomatic anemia, blood in stool    ASSESSMENT AND PLAN:      1  Symptomatic anemia, iron deficiency - largely suspect that patient's anemia is secondary to menorrhagia  However, some abdominal complaints as well as reported dark stools  Cannot rule out PUD versus gastritis versus AVM versus malignancy versus H pylori versus celiac versus other  Hgb 6 this morning without overt GI bleeding  · Patient initially refused blood transfusion but after discussion, she is now agreeable  · I have asked primary team to transfuse patient with hemoglobin goal greater than 7  · Monitor blood counts  · Plan for EGD/colonoscopy tomorrow  · Clear liquid diet today; NPO after midnight  · Continue PPI BID  · Avoid NSAIDs  · Recommend gynecology consult    2  Abdominal pain - etiology unclear  Differential remains broad including PUD versus gastritis versus biliary etiology versus functional versus other including gynecologic  · Plan for EGD/colonoscopy tomorrow as detailed above  · Continue supportive care and clinical monitoring    3  Constipation - long-history of constipation  Reports hard stools  · Plan for colonoscopy as detailed above  · Recommend increasing fiber to 25-30 g/daily and adding stool softeners as needed  · Encourage hydration  ______________________________________________________________________    HISTORY OF PRESENT ILLNESS:  59-year-old female with history of menorrhagia, abnormal uterine bleeding previously on OCPs, iron deficiency anemia, presented to ED on 09/20 with abnormal lab results showing severe iron deficiency anemia  Sent in by PCP for evaluation  Patient reports fatigue, dyspnea on exertion, palpitations, and headaches  Many of these are chronic issues    Also reports some dizziness and lightheadedness  Patient reports that she has had some mild right-sided abdominal discomfort  Patient also reports dark stools but denies melena, hematochezia, diarrhea  She reports constipation with hard stools  Denies any dysphagia, odynophagia, hematemesis  Reports some subjective unintentional weight loss  Denies any previous EGD or colonoscopy  Denies family history of GI malignancy or IBD  Nonsmoker, nondrinker  GI consultation requested for evaluation of symptomatic anemia, iron deficiency anemia, and abdominal pain        REVIEW OF SYSTEMS:    CONSTITUTIONAL: Denies any fever, chills, rigors, and weight loss  HEENT: No earache or tinnitus, denies hearing loss or visual disturbances  CARDIOVASCULAR: No chest pain or palpitations   RESPIRATORY: Denies any cough, hemoptysis, shortness of breath or dyspnea on exertion  GASTROINTESTINAL: As noted in the History of Present Illness   GENITOURINARY: No problems with urination, denies any hematuria or dysuria  NEUROLOGIC: No dizziness or vertigo, denies headaches   MUSCULOSKELETAL: Denies any muscle or joint pain   SKIN: Denies skin rashes or itching   ENDOCRINE: Denies excessive thirst, denies intolerance to heat or cold  PSYCHOSOCIAL: Denies depression or anxiety, denies any recent memory loss     Historical Information   Past Medical History:   Diagnosis Date    Anemia      Past Surgical History:   Procedure Laterality Date    TUBAL LIGATION       Social History   Social History     Substance and Sexual Activity   Alcohol Use Never    Frequency: Never     Social History     Substance and Sexual Activity   Drug Use Never     Social History     Tobacco Use   Smoking Status Never Smoker   Smokeless Tobacco Never Used     Family History   Problem Relation Age of Onset    Hypertension Mother     Stroke Mother     No Known Problems Father     Hypertension Maternal Grandmother     Stroke Maternal Grandmother     Hypertension Maternal Uncle     Stroke Maternal Uncle     Stroke Paternal Aunt     No Known Problems Sister     HIV Brother     No Known Problems Daughter     No Known Problems Son     No Known Problems Maternal Grandfather     No Known Problems Paternal Grandmother     No Known Problems Paternal Grandfather     No Known Problems Sister     No Known Problems Daughter        Meds/Allergies   Medications Prior to Admission   Medication    ferrous sulfate 324 (65 Fe) mg     Current Facility-Administered Medications   Medication Dose Route Frequency    acetaminophen (TYLENOL) tablet 650 mg  650 mg Oral Q6H PRN    multi-electrolyte (PLASMALYTE-A/ISOLYTE-S PH 7 4) IV solution  125 mL/hr Intravenous Continuous    ondansetron (ZOFRAN) injection 4 mg  4 mg Intravenous Q6H PRN    pantoprazole (PROTONIX) injection 40 mg  40 mg Intravenous Q12H MURALI    polyethylene glycol (MIRALAX) packet 17 g  17 g Oral Daily    senna (SENOKOT) tablet 8 6 mg  1 tablet Oral Daily     No Known Allergies      PHYSICAL EXAM:      Objective   Blood pressure 132/76, pulse 78, temperature 98 6 °F (37 °C), resp  rate 18, height 5' 6" (1 676 m), weight 70 kg (154 lb 5 2 oz), last menstrual period 09/20/2020, SpO2 100 %  Body mass index is 24 91 kg/m²      Intake/Output Summary (Last 24 hours) at 9/21/2020 4402  Last data filed at 9/21/2020 9072  Gross per 24 hour   Intake 250 ml   Output 850 ml   Net -600 ml       General Appearance:   Alert, cooperative, no distress   HEENT:   Normocephalic, atraumatic, anicteric     Neck:   Supple, symmetrical, trachea midline   Lungs:   Clear to auscultation bilaterally; no rales, rhonchi or wheezing; respirations unlabored    Heart:   Regular rate and rhythm; no murmur, rub, or gallop   Abdomen:   Soft, mild tenderness in RLQ on deep palpation, non-distended; normal bowel sounds; no masses, no organomegaly    Genitalia:   Deferred    Rectal:   Deferred    Extremities:   No cyanosis, clubbing or edema    Pulses:   2+ and symmetric all extremities    Skin:   No jaundice, rashes, or lesions    Lymph Nodes:   No palpable cervical lymphadenopathy        LAB RESULTS:     Admission on 09/20/2020   Component Date Value    WBC 09/20/2020 8 40     RBC 09/20/2020 4 08     Hemoglobin 09/20/2020 6 6*    Hematocrit 09/20/2020 25 6*    MCV 09/20/2020 63*    MCH 09/20/2020 16 2*    MCHC 09/20/2020 25 8*    RDW 09/20/2020 22 8*    Platelets 77/10/1100 289     nRBC 09/20/2020 0     Neutrophils Relative 09/20/2020 71     Immat GRANS % 09/20/2020 0     Lymphocytes Relative 09/20/2020 18     Monocytes Relative 09/20/2020 8     Eosinophils Relative 09/20/2020 2     Basophils Relative 09/20/2020 1     Neutrophils Absolute 09/20/2020 5 99     Immature Grans Absolute 09/20/2020 0 03     Lymphocytes Absolute 09/20/2020 1 53     Monocytes Absolute 09/20/2020 0 67     Eosinophils Absolute 09/20/2020 0 13     Basophils Absolute 09/20/2020 0 05     Sodium 09/20/2020 141     Potassium 09/20/2020 3 4*    Chloride 09/20/2020 110*    CO2 09/20/2020 27     ANION GAP 09/20/2020 4     BUN 09/20/2020 9     Creatinine 09/20/2020 0 54*    Glucose 09/20/2020 110     Calcium 09/20/2020 8 7     AST 09/20/2020 11     ALT 09/20/2020 15     Alkaline Phosphatase 09/20/2020 55     Total Protein 09/20/2020 7 6     Albumin 09/20/2020 3 8     Total Bilirubin 09/20/2020 0 42     eGFR 09/20/2020 114     Lipase 09/20/2020 106     Sodium 09/21/2020 142     Potassium 09/21/2020 4 0     Chloride 09/21/2020 112*    CO2 09/21/2020 26     ANION GAP 09/21/2020 4     BUN 09/21/2020 6     Creatinine 09/21/2020 0 43*    Glucose 09/21/2020 84     Calcium 09/21/2020 8 4     eGFR 09/21/2020 123     WBC 09/21/2020 6 20     RBC 09/21/2020 3 74*    Hemoglobin 09/21/2020 6 0*    Hematocrit 09/21/2020 24 0*    MCV 09/21/2020 64*    MCH 09/21/2020 16 0*    MCHC 09/21/2020 25 0*    RDW 09/21/2020 22 6*    Platelets 50/24/2443 258        RADIOLOGY RESULTS: I have personally reviewed pertinent imaging studies     ==  Deborah Patton DO  Gastroenterology Fellow, PGY-4  [facility]

## 2020-09-21 NOTE — ASSESSMENT & PLAN NOTE
Likely due to gastritis  Reports epigastric pain radiating to the back worse with meals   History of over-the-counter NSAID use  Associated melanotic stools  Lipase 106  Plan-  Protonix IV 40 mg b i d  Consult gastroenterology, input appreciated  NPO after midnight for EGD/colonscopy on 9/22  Ramonita Elder

## 2020-09-21 NOTE — OCCUPATIONAL THERAPY NOTE
Occupational Therapy Evaluation     Patient Name: Mariah Novoa  NRWNS'E Date: 9/21/2020  Problem List  Principal Problem:    Symptomatic anemia  Active Problems:    Iron deficiency anemia    Blood in stool    Abdominal pain    Hypokalemia    Past Medical History  Past Medical History:   Diagnosis Date    Anemia      Past Surgical History  Past Surgical History:   Procedure Laterality Date    TUBAL LIGATION             09/21/20 0905   OT Last Visit   OT Visit Date 09/21/20   Note Type   Note type Eval only   Restrictions/Precautions   Weight Bearing Precautions Per Order No   Other Precautions Fall Risk;Pain   Pain Assessment   Pain Assessment Tool 0-10   Pain Score 3   Pain Location/Orientation Location: Head   Hospital Pain Intervention(s) Repositioned; Ambulation/increased activity; Emotional support   Home Living   Type of Home Apartment   Home Layout Two level   Bathroom Shower/Tub Tub/shower unit   Bathroom Toilet Standard   Additional Comments pt had limited English - pt's daughter was there to interpret and give home set up - pt lives in a second floor apartment that does not have elevator access - approx 18 stairs with rails to get into house and 5 GINGER with rails - does not have any DME per pt's daughter    Prior Function   Level of [place] Independent with ADLs and functional mobility   Lives With Spouse; Family   Receives Help From Family   ADL Assistance Independent   IADLs Independent   Falls in the last 6 months 0   Vocational Full time employment   Comments pt lives with her  and 2 kids - the daugther present for session lives nearby as well    Lifestyle   Autonomy independent with ADLs, IADLs, and functional mobility without AD - +    Reciprocal Relationships supportive family    Service to Others works FT    Intrinsic Gratification active participant - has children (2 are under 25)   Psychosocial   Psychosocial (WDL) WDL   Subjective   Subjective pt reports that her head hurts a little bit   ADL   Eating Assistance [address] 7  Independent   Additional Comments pt is independent with all ADLs no AD required    Bed Mobility   Supine to Sit 6  Modified independent   Additional items HOB elevated; Bedrails   Transfers   Sit to Stand 7  Independent   Additional items Increased time required   Stand to Sit 7  Independent   Additional items Increased time required   Functional Mobility   Functional Mobility 7  Independent   Activity Tolerance   Activity Tolerance Patient tolerated treatment well   Medical Staff Made Aware Andrea Linder DPT    RUE Assessment   RUE Assessment WFL   LUE Assessment   LUE Assessment WFL   Cognition   Overall Cognitive Status WFL   Arousal/Participation Alert; Responsive; Cooperative   Attention Within functional limits   Orientation Level Oriented to person;Oriented to place   Memory Within functional limits   Following Commands Follows all commands and directions without difficulty   Comments language barrier for some directions and questions - pt's daughter assisted with communication    Assessment   Limitation Decreased endurance   Prognosis Good   Assessment Pt is a 39 y o  female who was [facility] to [facility] on 9/20/2020 w/ Symptomatic anemia  Pt has a past medical history of menorrhagia, abnormal uterine bleeding, and iron-deficiency anemia for the past 10 years  Pt lives with her  and children in a second floor apartment with 5 GINGER and 18 steps inside  At baseline, pt was completing ADLs independently and functional mobility and transfers independently without any AD  Currently, pt is independent overall for ADLs and independent for mobility and transfers   Pt presents with deficits in the following categories: steps to enter environment endurance and communication  These deficits, along with pt's pain limit the pt's ability to safely engage in areas of occupation such as: care of children, homemaking, community mobility, job performance/volunteering and social participation  Pt has expressed that they will receive adequate support from her family in order to safely perform these occupations  Upon discharge, OT would recommend pt return home with family support when medically cleared  Pt presents with no further OT concerns - will be discharged from caseload at this time      Goals   Patient Goals none stated due to language barrier   Recommendation   OT Discharge Recommendation Return to previous environment with social support   OT - OK to Discharge Yes   Additional Comments  when medically cleared        Gui Joya, OTS

## 2020-09-21 NOTE — PROGRESS NOTES
Bedside Endometrial biopsy    Patient verbally consented for endometrial biopsy for menorrhagia in 39year old patinet  We reviewed the risk of discomfort, infection, bleeding, need for repeat or further procedures and uterine perforation  We reviewed the benefits and alternatives inpatient was agreeable to proceed with endometrial biopsy  Patient was placed in supine position  Sterile clothes were donned speculum was placed to visualize cervix  A moderate amount of blood was noted in the vaginal fornix  The endometrial biopsy Pipelle was advanced to the cervix and sounded to 8 cm  A moderate amount of tissue was obtained in 1 pass and sent for pathological evaluation  After the procedure was completed the speculum was removed  Overall patient tolerated well and we reviewed that the biopsy results take approximately 7 days and that they would be reviewed in the office      Endometrial biopsy completed with Dr Adenike Lynch MD  09/21/20

## 2020-09-21 NOTE — TELEPHONE ENCOUNTER
I received tiger text over weekend about critical result   I did send message back to lab staff noting that she needs to notify the doctor on call to address results with pt and most likely needs ED evaluation, transfusion, etc

## 2020-09-21 NOTE — UTILIZATION REVIEW
Initial Clinical Review    Admission: Date/Time/Statement:   Admission Orders (From admission, onward)     Ordered        09/20/20 1843  Inpatient Admission  Once                   Orders Placed This Encounter   Procedures    Inpatient Admission     Standing Status:   Standing     Number of Occurrences:   1     Order Specific Question:   Admitting Physician     Answer:   Ye Mascorro [[id number]]     Order Specific Question:   Level of Care     Answer:   Med Surg [16]     Order Specific Question:   Estimated length of stay     Answer:   More than 2 Midnights     Order Specific Question:   Certification     Answer:   I certify that inpatient services are medically necessary for this patient for a duration of greater than two midnights  See H&P and MD Progress Notes for additional information about the patient's course of treatment  ED Arrival Information     Expected Arrival Acuity Means of Arrival Escorted By Service Admission Type    - 9/20/2020 16:00 Emergent Walk-In Family [facility] General Medicine Emergency    Arrival Complaint    abnormal labs         Chief Complaint   Patient presents with    Abnormal Lab     Pt  reports Dr  sent her in for iron infusion  Has heavy menstrual periods, has been trying to find out for years why  Was told her Hgb was under 7  Pt  refusing blood transfusion  Assessment/Plan:   Ms Marleny Bryant is a 38 yo female who presents to the ED from home with c/o abnormal lab results showing severe iron-deficiency anemia with hgb of 6 6 and mild hypokalemia  She has fatigue, SOB on minimal exertion, palpitations, headaches, dizziness and lightheadedness with menstrual cycle which is heavy and comes with clots, exertional retrosternal chest pain, burning, epigastric and constant abd pain with radiation to back worse with eating and sometimes fasting, dark stools, use of NSAIDs  PMH: menorrhagia, abnormal uterine bleeding previously on OCPs, iron-deficiency anemia x10 yrs    In the ED she was started on IV Venofer  She refuses blood transfusion  She is admitted to INPATIENT status with Symptomatic Anemia - likey r/t GI bleed and menorrhagia with abnormal uterine bleeding - Venofer infusion, iron pills,burning epigastric and constant hemeOnc, OB GYN consult, H H q 12 hrs  Abdominal pain - likely r/t gastritis with melanotic stools - IV Protonix, GI consult,  NPO p MN for possible GI procedure  Hypokalemia 3 1 - replete and trend  9/21 Heme-Onc Consult - pt has fear of blood transfusion reaction and after it was discussed with her she will agree to transfusion if necessary  She may need malabsorptive workup  Oral supplementation, or IV Venofer x 5 doses    Will need OP f/u with hematology group       9/21 hgb today is 6 0    ED Triage Vitals [09/20/20 1617]   Temperature Pulse Respirations Blood Pressure SpO2   98 6 °F (37 °C) 82 18 159/84 100 %      Temp Source Heart Rate Source Patient Position - Orthostatic VS BP Location FiO2 (%)   Oral Monitor Sitting Right arm --      Pain Score       6          Wt Readings from Last 1 Encounters:   09/20/20 70 kg (154 lb 5 2 oz)     Additional Vital Signs:   09/21/20 07:21:11   98 6 °F (37 °C)   78   18   132/76   95   100 %   --   --    09/20/20 21:47:06   98 2 °F (36 8 °C)   68   18   132/66   88   100 %   --   --    09/20/20 19:51:16   99 4 °F (37 4 °C)   77   18   159/79   106   100 %   --   --    09/20/20 1900   --   76   16   152/70   101   100 %   None (Room air)   Sitting    09/20/20 1803   --   74   16   131/65   --   100 %   None (Room air)   --      Pertinent Labs/Diagnostic Test Results:     Results from last 7 days   Lab Units 09/21/20  0527 09/20/20  1700 09/19/20  0935   WBC Thousand/uL 6 20 8 40 5 79   HEMOGLOBIN g/dL 6 0* 6 6* 6 8*   HEMATOCRIT % 24 0* 25 6* 26 6*   PLATELETS Thousands/uL 258 289 229   NEUTROS ABS Thousands/µL  --  5 99 3 52         Results from last 7 days   Lab Units 09/21/20  0527 09/20/20  1627   SODIUM mmol/L 142 141   POTASSIUM mmol/L 4 0 3 4*   CHLORIDE mmol/L 112* 110*   CO2 mmol/L 26 27   ANION GAP mmol/L 4 4   BUN mg/dL 6 9   CREATININE mg/dL 0 43* 0 54*   EGFR ml/min/1 73sq m 123 114   CALCIUM mg/dL 8 4 8 7     Results from last 7 days   Lab Units 09/20/20  1627   AST U/L 11   ALT U/L 15   ALK PHOS U/L 55   TOTAL PROTEIN g/dL 7 6   ALBUMIN g/dL 3 8   TOTAL BILIRUBIN mg/dL 0 42         Results from last 7 days   Lab Units 09/21/20  0527 09/20/20  1627   GLUCOSE RANDOM mg/dL 84 110     Results from last 7 days   Lab Units 09/19/20  0935   FERRITIN ng/mL 2*     Results from last 7 days   Lab Units 09/20/20  1627   LIPASE u/L 106     ED Treatment:   Medication Administration from 09/20/2020 1600 to 09/20/2020 1939    Date/Time Order Dose Route Action   09/20/2020 1811 iron sucrose (VENOFER) 300 mg in sodium chloride 0 9 % 250 mL IVPB 300 mg Intravenous New Bag        Past Medical History:   Diagnosis Date    Anemia      Present on Admission:   Iron deficiency anemia   Blood in stool    Admitting Diagnosis: Blood in stool [K92 1]  Weakness [R53 1]  Symptomatic anemia [D64 9]     Age/Sex: 39 y o  female     Admission Orders:  Scheduled Medications:  bisacodyl, 10 mg, Oral  folic acid, 1 mg, Oral, Daily  [START ON 9/22/2020] iron sucrose, 200 mg, Intravenous, Once  pantoprazole, 40 mg, Intravenous, Q12H [facility] & long-term  polyethylene glycol, 4,000 mL,   polyethylene glycol, 17 g, Oral, Daily  senna, 1 tablet, Oral, Daily    Continuous IV Infusions:  multi-electrolyte, 125 mL/hr, Intravenous, Continuous      PRN Meds:  acetaminophen, 650 mg, Oral, Q6H PRN - x 1 9/20, 9/211  ondansetron, 4 mg, Intravenous, Q6H PRN    SCDs  Ambulate q shift   9/21 diet cl liq   9/22 NPO p MN for colonoscopy and EGD   IP CONSULT TO GASTROENTEROLOGY  IP CONSULT TO CASE MANAGEMENT  IP CONSULT TO HEMATOLOGY  IP CONSULT TO OB GYN    Network Utilization Review Department  [email or web address]  ATTENTION: Please call with any questions or concerns to [phone] and carefully listen to the prompts so that you are directed to the right person  All voicemails are confidential   Sudie Crigler all requests for admission clinical reviews, approved or denied determinations and any other requests to dedicated fax number below belonging to the campus where the patient is receiving treatment   List of dedicated fax numbers for the Facilities:  [address] DENIALS (Administrative/Medical Necessity) [phone]   1000  [facility] (Maternity/NICU/Pediatrics) [phone] [facility] [phone]   Kelsi Sheets [phone]   Angle Juniper [phone]   Rhodia Clore [phone]   [address] [phone]   [facility]  [phone]   [address] [facility] [address]  [address] [facility] [address] [facility] [phone]

## 2020-09-21 NOTE — H&P
INTERNAL MEDICINE RESIDENCY ADMISSION H&P     Name: Angel Robin   Age & Sex: 39 y o  female   MRN: [id number]  Unit/Bed#: [facility] 924-01   Encounter: [id number]  Primary Care Provider: Jaja Darling PA-C    Code Status: Level 1 - Full Code  Admission Status: INPATIENT   Disposition: Patient requires Med/Surg    Admit to team: SOD Team C     ASSESSMENT/PLAN     Principal Problem:    Symptomatic anemia  Active Problems:    Abdominal pain    Iron deficiency anemia    Blood in stool    Hypokalemia      * Symptomatic anemia  Assessment & Plan  Likely due to GI bleed and menorrhagia with abnormal uterine bleeding  Patient Has a history of Iron deficiency anemia for x10yrs   Declined blood transfusion in past   Reported Epigastric pain worse with food radiates to the back, associated with melanotic stools  No previous colonoscopy or EGD  history of menorrhagia and abnormal uterine bleeding previously on OCPs  Reported he easy fatigability, shortness of breath on exertion, palpitations, headache  Patient also reporting exertional chest pain-may be due to chronic anemia vs cardiac source  Hb on presentation 6  6  Called to come to ED because of abnormal results showing iron deficiency anemia and positive FOBT  Patient  currently declining blood transfusion due to personal reasons despite been told the need for urgent blood transfusion and consequences of having low Hb given her continued blood loss  PLAN  Venofer infusion x1   Can start iron pills tomorrow  Gastroenterology , OBGYN consult  Can monitor her H &H q 12h as she is still refusing BT   NPO midnight for possible GI procedure  Consult hematology respiration is currently refusing blood transfusion  Abdominal pain  Assessment & Plan  Likely due to gastritis  Reports epigastric pain radiating to the back worse with meals   History of over-the-counter NSAID use  Associated melanotic stools  Lipase 106    Plan-  Protonix IV 40 mg b i d  Consult gastroenterology, input appreciated  NPO midnight for possible GI procedure       Hypokalemia  Assessment & Plan  3 1 on admission  Repleted with 40mEq KCl once  Iron deficiency anemia  Assessment & Plan  Hemoglobin on presentation 6 6  Recent iron studies showed ferritin of 2,Low iron and TIBC 396  FOBT positive  Current started her menstrual cycle  Plan :  Venofer , iron supplementation tomorrow  Gastroenterology and Ob Gyn Consult  VTE Pharmacologic Prophylaxis: Sequential compression device (Venodyne)   VTE Mechanical Prophylaxis: sequential compression device    CHIEF COMPLAINT     Chief Complaint   Patient presents with    Abnormal Lab     Pt  reports Dr  sent her in for iron infusion  Has heavy menstrual periods, has been trying to find out for years why  Was told her Hgb was under 7  Pt  refusing blood transfusion  HISTORY OF PRESENT ILLNESS     77-year-old Danish-speaking female with a past medical history of menorrhagia, abnormal uterine bleeding previously on OCPs, iron-deficiency anemia x10 yrs who came into the ED on 09/20/2020 due to abnormal lab results showing severe iron-deficiency anemia  History obtained using  phone  Patient was recently sent for CBC and iron studies by PCP and was called because of abnormal lab results  She reported easy fatigability, shortness of breath on minimal exertion, palpitation, headaches which has been known to you from a many years now  She also reported dizziness and lightheadedness which is associated with menstrual cycle   Menstrual cycle is heavy and comes in clots  is  Patient also reported exertional chest pain which is retrosternal-could not specify how long this has been happening  She also reported abdominal pain, epigastric in location burning in nature, constant, radiates to the back, worse with eating and sometimes fasting    Associated dark stools, and history of over-the-counter NSAID use    Denied any fever, chills,blood in urine, n/v, diarrhea or constipation  At the ED,/84 , otherwise vitals stable, labs showed severe microcytic anemia with Hb 6 6  Mild hypokalemia  She was started on Venofer under admitted for symptomatic anemia  She has a history of chronic iron deficiency anemia has refused blood transfusions in the past due to personal reasons  NKDA  She is para 3  And tubal ligation after last pregnancy  Family history of heart attack in [address] mum ,stroke in sister, Lupus  Works in a warehouse, Lives with her 3 kids,no history of tobacco/ alcohol / recreational drug use  REVIEW OF SYSTEMS     Review of Systems   Constitutional: Positive for fatigue  Negative for chills, fever and unexpected weight change  HENT: Negative for congestion, sneezing and sore throat  Respiratory: Positive for shortness of breath  Negative for cough, chest tightness and wheezing  Cardiovascular: Positive for palpitations  Negative for chest pain and leg swelling  Gastrointestinal: Positive for abdominal pain  Negative for constipation, diarrhea, nausea and vomiting  Endocrine: Negative for polydipsia, polyphagia and polyuria  Genitourinary: Positive for vaginal bleeding  Negative for dyspareunia, dysuria and urgency  Musculoskeletal: Positive for back pain  Neurological: Positive for dizziness, light-headedness and headaches  Negative for tremors and speech difficulty  Psychiatric/Behavioral: Negative for behavioral problems, confusion, hallucinations and sleep disturbance  The patient is not nervous/anxious        OBJECTIVE     Vitals:    09/20/20 1803 09/20/20 1900 09/20/20 1951 09/20/20 2147   BP: 131/65 152/70 159/79 132/66   BP Location:  Right arm     Pulse: 74 76 77 68   Resp: 16 16 18 18   Temp:   99 4 °F (37 4 °C) 98 2 °F (36 8 °C)   TempSrc:       SpO2: 100% 100% 100% 100%   Weight:   70 kg (154 lb 5 2 oz)    Height:   5' 6" (1 676 m)       Temperature:   Temp (24hrs), Av 7 °F (37 1 °C), Min:98 2 °F (36 8 °C), Max:99 4 °F (37 4 °C)    Temperature: 98 2 °F (36 8 °C)  Intake & Output:  I/O     None        Weights:   IBW: 59 3 kg    Body mass index is 24 91 kg/m²  Weight (last 2 days)     Date/Time   Weight    20 19:51:16   70 (154 32)    20 1617   69 9 (154)            Physical Exam  Constitutional:       General: She is not in acute distress  Appearance: Normal appearance  She is not ill-appearing, toxic-appearing or diaphoretic  HENT:      Head: Normocephalic and atraumatic  Nose: No congestion or rhinorrhea  Mouth/Throat:      Mouth: Mucous membranes are moist       Pharynx: No oropharyngeal exudate  Eyes:      Extraocular Movements: Extraocular movements intact  Pupils: Pupils are equal, round, and reactive to light  Cardiovascular:      Rate and Rhythm: Normal rate and regular rhythm  Pulses: Normal pulses  Heart sounds: No murmur  No friction rub  No gallop  Pulmonary:      Effort: Pulmonary effort is normal  No respiratory distress  Breath sounds: Normal breath sounds  No wheezing  Abdominal:      General: Abdomen is flat  Bowel sounds are normal  There is no distension  Palpations: There is no mass  Tenderness: There is abdominal tenderness (epigastric)  There is no guarding  Musculoskeletal:         General: No swelling or deformity  Skin:     Capillary Refill: Capillary refill takes less than 2 seconds  Coloration: Skin is pale  Findings: No bruising, erythema or rash  Neurological:      General: No focal deficit present  Mental Status: She is alert and oriented to person, place, and time  Cranial Nerves: No cranial nerve deficit  Motor: No weakness     Psychiatric:         Mood and Affect: Mood normal          Behavior: Behavior normal        PAST MEDICAL HISTORY     Past Medical History:   Diagnosis Date    Anemia      PAST SURGICAL HISTORY     Past Surgical History:   Procedure Laterality Date    TUBAL LIGATION       SOCIAL & FAMILY HISTORY     Social History     Substance and Sexual Activity   Alcohol Use Never    Frequency: Never     Substance and Sexual Activity   Alcohol Use Never    Frequency: Never        Substance and Sexual Activity   Drug Use Never     Social History     Tobacco Use   Smoking Status Never Smoker   Smokeless Tobacco Never Used     Family History   Problem Relation Age of Onset    Hypertension Mother     Stroke Mother     No Known Problems Father     Hypertension Maternal Grandmother     Stroke Maternal Grandmother     Hypertension Maternal Uncle     Stroke Maternal Uncle     Stroke Paternal Aunt     No Known Problems Sister     HIV Brother     No Known Problems Daughter     No Known Problems Son     No Known Problems Maternal Grandfather     No Known Problems Paternal Grandmother     No Known Problems Paternal Grandfather     No Known Problems Sister     No Known Problems Daughter      LABORATORY DATA     Labs: I have personally reviewed pertinent reports  Results from last 7 days   Lab Units 09/20/20  1700 09/19/20  0935   WBC Thousand/uL 8 40 5 79   HEMOGLOBIN g/dL 6 6* 6 8*   HEMATOCRIT % 25 6* 26 6*   PLATELETS Thousands/uL 289 229   NEUTROS PCT % 71 60   MONOS PCT % 8 9      Results from last 7 days   Lab Units 09/20/20  1627   POTASSIUM mmol/L 3 4*   CHLORIDE mmol/L 110*   CO2 mmol/L 27   BUN mg/dL 9   CREATININE mg/dL 0 54*   CALCIUM mg/dL 8 7   ALK PHOS U/L 55   ALT U/L 15   AST U/L 11                          Micro:  No results found for: BLOODCX, URINECX, WOUNDCULT, SPUTUMCULTUR  IMAGING & DIAGNOSTIC TESTS     Imaging: I have personally reviewed pertinent reports  No results found  EKG, Pathology, and Other Studies: I have personally reviewed pertinent reports  ALLERGIES   No Known Allergies  MEDICATIONS PRIOR TO ARRIVAL     Prior to Admission medications    Medication Sig Start Date End Date Taking? Authorizing Provider   ferrous sulfate 324 (65 Fe) mg Take 1 tablet (324 mg total) by mouth every other day 12/14/19  Yes Daniel Teo,    Levonorgest-Eth [address] 91-Day 0 15-0 03 &0 01 MG TABS Take 1 tablet by mouth daily  Patient not taking: Reported on 9/20/2020 12/4/19 9/20/20  LAURA Wong     MEDICATIONS ADMINISTERED IN LAST 24 HOURS     Medication Administration - last 24 hours from 09/20/2020 0322 to 09/21/2020 0322       Date/Time Order Dose Route Action Action by     09/20/2020 2010 iron sucrose (VENOFER) 300 mg in sodium chloride 0 9 % 250 mL IVPB 0 mg Intravenous Stopped Gal Petit RN     09/20/2020 1811 iron sucrose (VENOFER) 300 mg in sodium chloride 0 9 % 250 mL IVPB 300 mg Intravenous New Bag Donna Nicolas RN     09/20/2020 2014 acetaminophen (TYLENOL) tablet 650 mg 325 mg Oral Given Gal Petit RN     09/20/2020 2010 multi-electrolyte (PLASMALYTE-A/ISOLYTE-S PH 7 4) IV solution 125 mL/hr Intravenous [address] Gal Petit RN     09/20/2020 2014 potassium chloride (K-DUR,KLOR-CON) CR tablet 20 mEq 20 mEq Oral Given Gal Petit RN     09/20/2020 2220 pantoprazole (PROTONIX) 80 mg in sodium chloride 0 9 % 100 mL IVPB 80 mg Intravenous [address] Gal Petit RN     09/20/2020 2137 potassium chloride (K-DUR,KLOR-CON) CR tablet 40 mEq 40 mEq Oral Given Gal Petit RN        CURRENT MEDICATIONS     Current Facility-Administered Medications   Medication Dose Route Frequency Provider Last Rate    acetaminophen  650 mg Oral Q6H PRN Arianna Pickering MD      multi-electrolyte  125 mL/hr Intravenous Continuous Arianna Pickering  mL/hr (09/20/20 2010)    ondansetron  4 mg Intravenous Q6H PRN Arianna Pickering MD      pantoprazole  40 mg Intravenous Q12H [address], MD      polyethylene glycol  17 g Oral Daily Arianna Pickering MD      senna  1 tablet Oral Daily Arianna Pickering MD       multi-electrolyte, 125 mL/hr, Last Rate: 125 mL/hr (09/20/20 2010)      acetaminophen, 650 mg, Q6H PRN  ondansetron, 4 mg, Q6H PRN        Admission Time  I spent 45 minutes admitting the patient  This involved direct patient contact where I performed a full history and physical, reviewing previous records, and reviewing laboratory and other diagnostic studies  Portions of the record may have been created with voice recognition software  Occasional wrong word or "sound a like" substitutions may have occurred due to the inherent limitations of voice recognition software    Read the chart carefully and recognize, using context, where substitutions have occurred     ==  Saúl Hernandez MD  [address] [facility]  Internal Medicine Residency PGY-2

## 2020-09-21 NOTE — PHYSICAL THERAPY NOTE
Physical Therapy Evaluation    Patient's Name: Francis Hernandez    Admitting Diagnosis  Blood in stool [K92 1]  Weakness [R53 1]  Symptomatic anemia [D64 9]    Problem List  Patient Active Problem List   Diagnosis    Iron deficiency anemia    Menorrhagia with regular cycle    Epigastric pain    Mid back pain    Nausea    Blood in stool    Symptomatic anemia    Abdominal pain    Hypokalemia       Past Medical History  Past Medical History:   Diagnosis Date    Anemia        Past Surgical History  Past Surgical History:   Procedure Laterality Date    TUBAL LIGATION         Recent Imaging  US pelvis complete non OB    (Results Pending)       Recent Vital Signs  Vitals:    09/20/20 1900 09/20/20 1951 09/20/20 2147 09/21/20 0721   BP: 152/70 159/79 132/66 132/76   BP Location: Right arm      Pulse: 76 77 68 78   Resp: 16 18 18 18   Temp:  99 4 °F (37 4 °C) 98 2 °F (36 8 °C) 98 6 °F (37 °C)   TempSrc:       SpO2: 100% 100% 100% 100%   Weight:  70 kg (154 lb 5 2 oz)     Height:  5' 6" (1 676 m)          09/21/20 0904   PT Last Visit   PT Visit Date 09/21/20   Note Type   Note type Eval only   Pain Assessment   Pain Assessment Tool 0-10   Pain Score 3   Pain Location/Orientation Location: Head   Home Living   Type of Home Apartment   Home Layout Two level;Stairs to enter with rails  (2nd floor apartment, 2 flights up)   Bathroom Shower/Tub Tub/shower unit   [employer] Standard   Additional Comments 5 GINGER   Prior Function   Level of [place] Independent with ADLs and functional mobility   Lives With Spouse; Family   Receives Help From Family   ADL Assistance Independent   IADLs Independent   Falls in the last 6 months 0   Vocational Full time employment   Comments ind at baseline   Restrictions/Precautions   Weight Bearing Precautions Per Order No   Other Precautions Fall Risk;Pain   General   Family/Caregiver Present No   Cognition   Overall Cognitive Status WFL   Attention Within functional limits   Orientation Level Oriented to place;Oriented to person   Memory Within functional limits   Following Commands Follows all commands and directions without difficulty   Comments mild language barrier; preferred language is Malian   RLE Assessment   RLE Assessment WFL   LLE Assessment   LLE Assessment WFL   Coordination   Movements are Fluid and Coordinated 1   Sensation WFL   Bed Mobility   Supine to Sit 6  Modified independent   Additional items HOB elevated; Bedrails   Transfers   Sit to Stand 7  Independent   Additional items Increased time required   Stand to Sit 7  Independent   Additional items Increased time required   Ambulation/Elevation   Gait pattern Excessively slow; Short stride;Decreased foot clearance   Gait Assistance 7  Independent   Assistive Device None   Distance 100ft   Stair Management Assistance 6  Modified independent   Additional items Increased time required   Stair Management Technique One rail R;Step to pattern; Foreward   Number of Stairs 3   Balance   Static Sitting Good   Dynamic Sitting Fair +   Static Standing Fair   Dynamic Standing Fair   Ambulatory Fair   Endurance Deficit   Endurance Deficit No   Activity Tolerance   Activity Tolerance Patient tolerated treatment well   Medical Staff Made Aware Anna OT   Assessment   Prognosis Good   Problem List Decreased endurance; Impaired balance;Decreased mobility; Decreased strength   Barriers to Discharge Inaccessible home environment;Decreased caregiver support   Goals   Patient Goals none stated at this time   Plan   PT Frequency One time visit   Recommendation   PT Discharge Recommendation Return to previous environment with social support   PT - OK to Discharge Yes   Additional Comments home when medically cleared       ASSESSMENT                                                                                                                     Serjio Butler is a 39 y o  female admitted [facility]/2020 for Symptomatic anemia  Pt  has a past medical history of Anemia    PT was consulted and pt was seen on 9/21/2020 for mobility assessment and d/c planning  Pt presents supine in bed alert and agreeable to therapy  She is reporting mild headache at this time  She demonstrates functional strength in BLEs, sensation WFL  She ambulates with overall steady gait although with reduced speed  She demonstrated ability to complete steps without problems although unable to assess FF due to IV running  Pt is currently functioning at a modified independent assistance level for bed mobility, independent level for transfers, independent level for ambulation with no assistive device, and modified independent assistance for elevations  Currently PT recommendations for DME include none  At this time PT recommendations for d/c are home with social support from family          Matthias Booth PT, DPT

## 2020-09-21 NOTE — CONSULTS
Consult - OB/GYN   Reta French 39 y o  female MRN: [id number]  Unit/Bed#: [facility] 924-01 Encounter: [id number]      Chief complaint:  Heavy menses    HPI:  Patient is in room with her daughter and declines - would prefer to use her daughter for interpretation  Kelly Holley is a 39 y o   sexually active reproductive aged female with tubal ligation for contraception admitted for symptomatic anemia  Patient reports a longstanding history  of very heavy mensesfor least the past 10 years  Patient reports a regular menstrual cycle approximately 28-30 days, lasting approximately 6 days  The kristy 3 days of her menses are very heavy and she saturates about 8-10 pads per day  She started her menstrual cycle yesterday, 2020 and reports her bleeding has not been that heavy  She reports she has tried OCPs in the past but these had caused her N/V and stomach pain so she discontinued  She denies any significant GYN history of abnormal pap smears, infections or GYN surgeries besides her tubal ligation  She denies any familial history of GYN or colon cancers  Patient reports she has had anemia for a long time that she takes oral iron for- she has had venofer transfusions in the past  She reports she had been having symptoms of shortness of breath, chest heaviness and dizziness  Hg on this admission is 6 0 and she recently just agreed to blood transfusion  Patient also reports melanotic stools for which she is being followed by GI who plans to complete EGD and colonoscopy tomorrow, 2020 for italo  She is a patient of [facility]  PMH:  Past Medical History:   Diagnosis Date    Anemia      PSH:  Past Surgical History:   Procedure Laterality Date    TUBAL LIGATION       Social Hx:  Denies tobacco, alcohol or iliicit drug use    Meds:  No current facility-administered medications on file prior to encounter        Current Outpatient Medications on File Prior to Encounter Medication Sig Dispense Refill    ferrous sulfate 324 (65 Fe) mg Take 1 tablet (324 mg total) by mouth every other day 45 tablet 3     Family History   Problem Relation Age of Onset    Hypertension Mother     Stroke Mother     No Known Problems Father     Hypertension Maternal Grandmother     Stroke Maternal Grandmother     Hypertension Maternal Uncle     Stroke Maternal Uncle     Stroke Paternal Aunt     No Known Problems Sister     HIV Brother     No Known Problems Daughter     No Known Problems Son     No Known Problems Maternal Grandfather     No Known Problems Paternal Grandmother     No Known Problems Paternal Grandfather     No Known Problems Sister     No Known Problems Daughter      Allergies:  No Known Allergies    Review of Systems   Constitutional: Positive for fatigue  Negative for activity change, appetite change, chills, fever and unexpected weight change  Respiratory: Positive for shortness of breath  Cardiovascular: Negative for chest pain, palpitations and leg swelling  Gastrointestinal: Positive for blood in stool  Negative for abdominal distention, abdominal pain, constipation, diarrhea, nausea and vomiting  Genitourinary: Positive for menstrual problem and vaginal bleeding  Negative for dyspareunia, dysuria, flank pain, frequency, hematuria, pelvic pain, vaginal discharge and vaginal pain  Neurological: Positive for dizziness  All other systems reviewed and are negative  Physical Exam:  /76   Pulse 78   Temp 98 6 °F (37 °C)   Resp 18   Ht 5' 6" (1 676 m)   Wt 70 kg (154 lb 5 2 oz)   LMP 09/20/2020   SpO2 100%   BMI 24 91 kg/m²     Physical Exam  Vitals signs reviewed  Constitutional:       General: She is not in acute distress  Appearance: She is well-developed  She is not diaphoretic  HENT:      Head: Normocephalic and atraumatic  Neck:      Musculoskeletal: Normal range of motion and neck supple     Cardiovascular:      Rate and Rhythm: Normal rate and regular rhythm  Heart sounds: Normal heart sounds  No murmur  No friction rub  No gallop  Pulmonary:      Effort: Pulmonary effort is normal  No respiratory distress  Breath sounds: Normal breath sounds  No wheezing or rales  Abdominal:      Palpations: Abdomen is soft  Tenderness: There is no abdominal tenderness  There is no guarding or rebound  Genitourinary:     Vagina: Normal       Comments: On speculum examination cervix appeared multiparous with with moderate blood in fornix, cervix, vagina and vulva appear grossly normal  On bimanual exam a slightly enlarged uterus, mobile and anteverted was noted with o adnexal masses or fullness appreciated  Skin:     General: Skin is warm and dry  Neurological:      Mental Status: She is alert and oriented to person, place, and time  Psychiatric:         Behavior: Behavior normal        Assessment/ Plan:   39 y o    female with tubal ligation for contraception admitted for symptomatic anemia likely secondary to menorrhagia given this is a chronic anemia with a chronic history of very heavy menses  Patient also is being worked up for GI bleeding by Gastroenterology for EGD and colonoscopy tomorrow  1  Menorrhagia and symptomatic anemia   - Hg 6 0- patient now agreeable to transfusion, heme onc following   - Endometrial biopsy completed at bedside today-please see separate note   - Transvaginal pelvic ultrasound ordered to assess architecture uterus   - Patient is not a good candidate for OCPs given her age and it appears in addition she has elevated blood pressures here while in hospital   She would be a candidate for Aygestin or TX a while in hospital   At this time patient does not have very heavy menstrual bleeding- would recommend TXA 1 g IV over 10 minutes if you would like to slow her vaginal bleeding down secondary to anemia     - we reviewed other treatments for menorrhagia including Depo-Provera, hormonal IUD and surgery- patient would like to follow up outpatient to discuss treatment further in office  She would be an excellent candidate for Mirena IUD  Patient seen with Dr Jackson Harris MD  09/21/20

## 2020-09-21 NOTE — CONSULTS
[address]    Hematology Oncology CONSULTATION   Vashti Godoy 39 y o  female MRN: [id number]  [facility] Encounter: [id number]    Code Status: Level 1 - Full Code  POA:      Reason for Admission / Principal Problem: Symptomatic Anemia  Reason for Consult: Iron Deficiency Anemia  Impression:  Patient Active Problem List   Diagnosis    Iron deficiency anemia    Menorrhagia with regular cycle    Epigastric pain    Mid back pain    Nausea    Blood in stool    Symptomatic anemia    Abdominal pain    Hypokalemia       A/P:  Symptomatic anemia  Hemoglobin currently 6 0, was admitted with hemoglobin of 6 8  Likely secondary to menorrhagia, iron deficiency anemia  For command transfusion for hemoglobin less than 7, did discuss with patient as to why she refused in the past   She admits to apprehension with regards to possible reaction to transfusion  Did explain transfusion procedures, screenings, medications that can be given if she has an allergic reaction, patient seemed to understand and was agreeable to transfusion only if necessary  Continue to monitor hemoglobin and supplement iron as described below  Currently undergoing OB Gyn, GI evaluation    Iron deficiency anemia  Patient's hemoglobin 6 0, MCV 64, RDW 22  Likely secondary to menorrhagia, currently on day 1 of 6  Patient is on panel from 09/19 showed iron saturation of 3, TIBC of 396, iron of 11, ferritin of 2  Did receive IV Venofer on admission  Her iron deficit as per Anatoly Anaya with target Hemoglobin of 7 is 668mg  May need malabsorptive workup (possible celiac)  Recommend initiating oral supplementation with ferrous sulfate 324 mg daily if patient is able tolerate or IV Venofer 200 mg IV x5 doses  Will need to follow up with hematology for IV venofer infusions as an outpatient      Please see attending at attestation further recommendations  Rest of care per primary team    HPI: Tyler Bailey is a 39 y o  female  with history of menorrhagia and iron deficiency anemia who presents to [facility] for symptomatic anemia  Patient's daughter was in the room while history was taken and assisted in translation  Patient states that she has been diagnosed with iron deficiency anemia for approximately 10 years  Has never received a blood transfusion in the past   Over the past few days she has felt more headaches, shortness of breath with minimal exertion  She is currently undergoing her menses  Does note dark stools was has been on chronic iron supplementation  Denies any nausea, vomiting, hematemesis, coffee-ground emesis, hematochezia  Denies any family history of bleeding disorders, cancers  History obtained from patient and chart review       PMH:  Past Medical History:   Diagnosis Date    Anemia         PSH:  Past Surgical History:   Procedure Laterality Date    TUBAL LIGATION         Allergies: No Known Allergies    Family History:   Family History   Problem Relation Age of Onset    Hypertension Mother     Stroke Mother     No Known Problems Father     Hypertension Maternal Grandmother     Stroke Maternal Grandmother     Hypertension Maternal Uncle     Stroke Maternal Uncle     Stroke Paternal Aunt     No Known Problems Sister     HIV Brother     No Known Problems Daughter     No Known Problems Son     No Known Problems Maternal Grandfather     No Known Problems Paternal Grandmother     No Known Problems Paternal Grandfather     No Known Problems Sister     No Known Problems Daughter        Social History:   Social History     Tobacco Use   Smoking Status Never Smoker   Smokeless Tobacco Never Used      Social History     Substance and Sexual Activity   Alcohol Use Never    Frequency: Never      Social History     Substance and Sexual Activity   Drug Use Never        Home Medications:   Prior to Admission medications    Medication Sig Start Date End Date Taking? Authorizing Provider   ferrous sulfate 324 (65 Fe) mg Take 1 tablet (324 mg total) by mouth every other day 12/14/19  Yes Pippa Gracia DO       ROS:   Review of Systems   Constitutional: Positive for fatigue  Negative for chills and fever  HENT: Negative for congestion and sore throat  Eyes: Positive for itching  Respiratory: Positive for shortness of breath  Negative for cough  Cardiovascular: Negative for chest pain, palpitations and leg swelling  Gastrointestinal: Positive for nausea  Negative for abdominal pain, constipation, diarrhea and vomiting  Genitourinary: Positive for vaginal bleeding  Negative for frequency and hematuria  Musculoskeletal: Negative for arthralgias and back pain  Neurological: Negative for dizziness and headaches  Psychiatric/Behavioral: Negative for agitation and confusion  All other systems reviewed were negative  Vitals:   Vitals:    09/20/20 1900 09/20/20 1951 09/20/20 2147 09/21/20 0721   BP: 152/70 159/79 132/66 132/76   BP Location: Right arm      Pulse: 76 77 68 78   Resp: 16 18 18 18   Temp:  99 4 °F (37 4 °C) 98 2 °F (36 8 °C) 98 6 °F (37 °C)   TempSrc:       SpO2: 100% 100% 100% 100%   Weight:  70 kg (154 lb 5 2 oz)     Height:  5' 6" (1 676 m)       Temp  Min: 98 2 °F (36 8 °C)  Max: 99 4 °F (37 4 °C)  IBW: 59 3 kg  Body mass index is 24 91 kg/m²  PHYSICAL EXAM:  Physical Exam  Constitutional:       General: She is not in acute distress  Appearance: Normal appearance  She is not ill-appearing  HENT:      Head: Normocephalic and atraumatic  Mouth/Throat:      Mouth: Mucous membranes are moist    Eyes:      Extraocular Movements: Extraocular movements intact  Conjunctiva/sclera: Conjunctivae normal       Pupils: Pupils are equal, round, and reactive to light        Comments: Pale conjunctiva bilaterally   Cardiovascular:      Rate and Rhythm: Normal rate and regular rhythm  Pulses: Normal pulses  Heart sounds: Normal heart sounds  No murmur  No friction rub  No gallop  Pulmonary:      Effort: Pulmonary effort is normal  No respiratory distress  Breath sounds: Normal breath sounds  No wheezing or rales  Abdominal:      General: Abdomen is flat  Bowel sounds are normal  There is no distension  Palpations: Abdomen is soft  There is no mass  Tenderness: There is abdominal tenderness  There is no guarding  Comments: Some mild epigastric tenderness   Musculoskeletal:      Right lower leg: No edema  Left lower leg: No edema  Skin:     General: Skin is warm and dry  Neurological:      General: No focal deficit present  Mental Status: She is alert and oriented to person, place, and time  Psychiatric:         Mood and Affect: Mood normal          Behavior: Behavior normal          Labs:   Results from last 7 days   Lab Units 09/21/20  0527 09/20/20  1700 09/19/20  0935   WBC Thousand/uL 6 20 8 40 5 79   HEMOGLOBIN g/dL 6 0* 6 6* 6 8*   HEMATOCRIT % 24 0* 25 6* 26 6*   PLATELETS Thousands/uL 258 289 229   NEUTROS PCT %  --  71 60   MONOS PCT %  --  8 9     Results from last 7 days   Lab Units 09/21/20  0527 09/20/20  1627   POTASSIUM mmol/L 4 0 3 4*   CHLORIDE mmol/L 112* 110*   CO2 mmol/L 26 27   BUN mg/dL 6 9   CREATININE mg/dL 0 43* 0 54*   CALCIUM mg/dL 8 4 8 7   ALK PHOS U/L  --  55   ALT U/L  --  15   AST U/L  --  11         No results found for: PHOS       No results found for: TROPONINT  ABG:No results found for: PHART, SVW4LTN, PO2ART, UHS3PYV, J6XKZZDT, BEART, SOURCE    Imaging: I have personally reviewed pertinent reports  EKG: This was personally reviewed by myself     Micro:  Blood Culture: No results found for: BLOODCX  Urine Culture: No results found for: URINECX  Sputum Culture: No components found for: SPUTUMCX  Wound Culure: No results found for: WOUNDCULT    VTE Pharmacologic Prophylaxis: RX contraindicated due to:  Anemia  VTE Mechanical Prophylaxis: sequential compression device    DO Hanny  9/21/2020 8:35 AM

## 2020-09-21 NOTE — CASE MANAGEMENT
Met with pt and aylin, explained CM program/CM role  LOS-1  Bundle-no  Unplanned readmission color-green  30 day readmission-no  Resides with family in an apartment, [address], main floor set up  I PTA for ADL's/ambulation, she drives, is employed  PCP RICKI Boles PA  Uses [facility] [address]  Denies HHC/DME/IP Rehab  Denies MH illness, D&A abuse  Primary contact is aylin [address], [phone]  No POA/LW  Family will transport home    Patient/caregiver received discharge checklist  Content reviewed  Patient/caregiver encouraged to participate in discharge plan of care prior to discharge home  CM reviewed d/c planning process including the following: identifying help at home, patient preference for d/c planning needs, Discharge Lounge, [facility] Meds to Bed program, availability of treatment team to discuss questions or concerns patient and/or family may have regarding understanding medications and recognizing signs and symptoms once discharged  CM also encouraged patient to follow up with all recommended appointments after discharge  Patient advised of importance for patient and family to participate in managing patients medical well being

## 2020-09-21 NOTE — PROGRESS NOTES
INTERNAL MEDICINE RESIDENCY PROGRESS NOTE     Name: Vashti Godoy   Age & Sex: 39 y o  female   MRN: [id number]  Unit/Bed#: [facility] 924-01   Encounter: [id number]  Team: SOD Team C     PATIENT INFORMATION     Name: Vashti Godoy   Age & Sex: 39 y o  female   MRN: [id number]  Hospital Stay Days: 1    ASSESSMENT/PLAN     Principal Problem:    Symptomatic anemia  Active Problems:    Iron deficiency anemia    Blood in stool    Abdominal pain    Hypokalemia      Hypokalemia  Assessment & Plan  3 1 on admission  Repleted with 40mEq KCl once and 20mEq KCl once  Currently 4 0  Monitor BMP    Abdominal pain  Assessment & Plan  Likely due to gastritis  Reports epigastric pain radiating to the back worse with meals   History of over-the-counter NSAID use  Associated melanotic stools  Lipase 106  Plan-  Protonix IV 40 mg b i d  Consult gastroenterology, input appreciated  NPO after midnight for EGD/colonscopy on 9/22       Iron deficiency anemia  Assessment & Plan  Hemoglobin on presentation 6 6 -> 6 0 on 9/21 AM   Recent iron studies showed ferritin of 2,Low iron and TIBC 396  FOBT positive  Current started on her menstrual cycle  Received Venofer x1 on 9/20  Plan :  Iron supplementation yesterday  Gastroenterology and Ob Gyn Consult  See Symptomatic anemia problem for plan    * Symptomatic anemia  Assessment & Plan  Likely due to GI bleed and menorrhagia with abnormal uterine bleeding  Patient Has a history of Iron deficiency anemia for x10yrs   Declined blood transfusion in past   Reported Epigastric pain worse with food radiates to the back, associated with melanotic stools  No previous colonoscopy or EGD  history of menorrhagia and abnormal uterine bleeding previously on OCPs  Reported he easy fatigability, shortness of breath on exertion, palpitations, headache  Patient also reporting exertional chest pain-may be due to chronic anemia vs cardiac source    Hb on presentation 6 6 -> 6 0 on  AM Called to come to ED because of abnormal results showing iron deficiency anemia and positive FOBT  Patient  currently declining blood transfusion due to personal reasons despite been told the need for urgent blood transfusion and consequences of having low Hb given her continued blood loss  Discussed with her again on   After discussion again with GI, she agreed to blood transfusion  PLAN  Venofer infusion x1 on   Gastroenterology, OBGYN consult  Per GI, will undergo EGD/colonscopy on   Patient agreed to blood transfusion in order to under EGD/colonoscopy, as GI requires a minimum Hgb of 7 for procedure  Will transfuse today to bring Hgb up to 7  NPO after midnight  K33 and folic acid supplementation ordered  Disposition: Inpatient, awaiting EGD/colonoscopy on   SUBJECTIVE     Patient seen and examined  No acute events overnight  Patient complaining of mild headache, improved with Tylenol, but otherwise has no complaints  Still feels a little tired, but otherwise denies dizziness, lightheadedness, fevers, chills, nausea, vomiting, abdominal pain, chest pain, SOB, or any other symptoms  She has not had a bowel movement since she was in the ED  OBJECTIVE     Vitals:    20 1900 20 1951 20 2147 20 0721   BP: 152/70 159/79 132/66 132/76   BP Location: Right arm      Pulse: 76 77 68 78   Resp: 16 18 18 18   Temp:  99 4 °F (37 4 °C) 98 2 °F (36 8 °C) 98 6 °F (37 °C)   TempSrc:       SpO2: 100% 100% 100% 100%   Weight:  70 kg (154 lb 5 2 oz)     Height:  5' 6" (1 676 m)        Temperature:   Temp (24hrs), Av 7 °F (37 1 °C), Min:98 2 °F (36 8 °C), Max:99 4 °F (37 4 °C)    Temperature: 98 6 °F (37 °C)  Intake & Output:  I/O       701 -  07 07 -  0700 701 -  0700    P  O   0     IV Piggyback  250     Total Intake(mL/kg)  250 (3 6)     Urine (mL/kg/hr)  850     Total Output  850     Net  -600 Weights:   IBW: 59 3 kg    Body mass index is 24 91 kg/m²  Weight (last 2 days)     Date/Time   Weight    09/20/20 19:51:16   70 (154 32)    09/20/20 1617   69 9 (154)            Physical Exam  Constitutional:       Appearance: Normal appearance  HENT:      Head: Normocephalic  Eyes:      General: No scleral icterus  Extraocular Movements: Extraocular movements intact  Comments: Conjunctival pallor    Cardiovascular:      Rate and Rhythm: Normal rate and regular rhythm  Pulses: Normal pulses  Heart sounds: Normal heart sounds  No murmur  No friction rub  No gallop  Pulmonary:      Effort: Pulmonary effort is normal       Breath sounds: Normal breath sounds  No wheezing, rhonchi or rales  Abdominal:      Palpations: Abdomen is soft  Tenderness: There is abdominal tenderness  Comments: Mild epigastric tenderness   Musculoskeletal:      Right lower leg: No edema  Left lower leg: No edema  Skin:     Coloration: Skin is pale  Neurological:      General: No focal deficit present  Mental Status: She is alert and oriented to person, place, and time  Psychiatric:         Mood and Affect: Mood normal        LABORATORY DATA     Labs: I have personally reviewed pertinent reports  Results from last 7 days   Lab Units 09/21/20  0527 09/20/20  1700 09/19/20  0935   WBC Thousand/uL 6 20 8 40 5 79   HEMOGLOBIN g/dL 6 0* 6 6* 6 8*   HEMATOCRIT % 24 0* 25 6* 26 6*   PLATELETS Thousands/uL 258 289 229   NEUTROS PCT %  --  71 60   MONOS PCT %  --  8 9      Results from last 7 days   Lab Units 09/21/20  0527 09/20/20  1627   POTASSIUM mmol/L 4 0 3 4*   CHLORIDE mmol/L 112* 110*   CO2 mmol/L 26 27   BUN mg/dL 6 9   CREATININE mg/dL 0 43* 0 54*   CALCIUM mg/dL 8 4 8 7   ALK PHOS U/L  --  55   ALT U/L  --  15   AST U/L  --  11                            IMAGING & DIAGNOSTIC TESTING     Radiology Results: I have personally reviewed pertinent reports  No results found    Other Diagnostic Testing: I have personally reviewed pertinent reports  ACTIVE MEDICATIONS     Current Facility-Administered Medications   Medication Dose Route Frequency    acetaminophen (TYLENOL) tablet 650 mg  650 mg Oral Q6H PRN    bisacodyl (DULCOLAX) EC tablet 10 mg  10 mg Oral See Admin Instructions    folic acid (FOLVITE) tablet 1 mg  1 mg Oral Daily    multi-electrolyte (PLASMALYTE-A/ISOLYTE-S PH 7 4) IV solution  125 mL/hr Intravenous Continuous    ondansetron (ZOFRAN) injection 4 mg  4 mg Intravenous Q6H PRN    pantoprazole (PROTONIX) injection 40 mg  40 mg Intravenous Q12H MURALI    polyethylene glycol (GOLYTELY) bowel prep 4,000 mL  4,000 mL Oral See Admin Instructions    polyethylene glycol (MIRALAX) packet 17 g  17 g Oral Daily    senna (SENOKOT) tablet 8 6 mg  1 tablet Oral Daily       VTE Pharmacologic Prophylaxis: Sequential compression device (Venodyne)   VTE Mechanical Prophylaxis: sequential compression device    Portions of the record may have been created with voice recognition software  Occasional wrong word or "sound a like" substitutions may have occurred due to the inherent limitations of voice recognition software    Read the chart carefully and recognize, using context, where substitutions have occurred   ==  Yary Silverio MD  [address]  Internal Medicine Residency PGY-1

## 2020-09-22 ENCOUNTER — APPOINTMENT (INPATIENT)
Dept: GASTROENTEROLOGY | Facility: HOSPITAL | Age: 45
DRG: 952 | End: 2020-09-22
Payer: COMMERCIAL

## 2020-09-22 ENCOUNTER — ANESTHESIA (INPATIENT)
Dept: GASTROENTEROLOGY | Facility: HOSPITAL | Age: 45
DRG: 952 | End: 2020-09-22
Payer: COMMERCIAL

## 2020-09-22 ENCOUNTER — ANESTHESIA EVENT (INPATIENT)
Dept: GASTROENTEROLOGY | Facility: HOSPITAL | Age: 45
DRG: 952 | End: 2020-09-22
Payer: COMMERCIAL

## 2020-09-22 VITALS
HEIGHT: 66 IN | DIASTOLIC BLOOD PRESSURE: 81 MMHG | WEIGHT: 154.32 LBS | RESPIRATION RATE: 20 BRPM | SYSTOLIC BLOOD PRESSURE: 132 MMHG | BODY MASS INDEX: 24.8 KG/M2 | TEMPERATURE: 97.9 F | HEART RATE: 65 BPM | OXYGEN SATURATION: 100 %

## 2020-09-22 VITALS — HEART RATE: 79 BPM

## 2020-09-22 LAB
ABO GROUP BLD BPU: NORMAL
ANION GAP SERPL CALCULATED.3IONS-SCNC: 5 MMOL/L (ref 4–13)
BASOPHILS # BLD AUTO: 0.06 THOUSANDS/ΜL (ref 0–0.1)
BASOPHILS NFR BLD AUTO: 1 % (ref 0–1)
BPU ID: NORMAL
BUN SERPL-MCNC: 3 MG/DL (ref 5–25)
CALCIUM SERPL-MCNC: 9.1 MG/DL (ref 8.3–10.1)
CHLORIDE SERPL-SCNC: 112 MMOL/L (ref 100–108)
CO2 SERPL-SCNC: 26 MMOL/L (ref 21–32)
CREAT SERPL-MCNC: 0.53 MG/DL (ref 0.6–1.3)
CROSSMATCH: NORMAL
EOSINOPHIL # BLD AUTO: 0.22 THOUSAND/ΜL (ref 0–0.61)
EOSINOPHIL NFR BLD AUTO: 4 % (ref 0–6)
ERYTHROCYTE [DISTWIDTH] IN BLOOD BY AUTOMATED COUNT: 24.1 % (ref 11.6–15.1)
EXT PREGNANCY TEST URINE: NEGATIVE
EXT. CONTROL: NORMAL
GFR SERPL CREATININE-BSD FRML MDRD: 115 ML/MIN/1.73SQ M
GLUCOSE SERPL-MCNC: 85 MG/DL (ref 65–140)
HCT VFR BLD AUTO: 29 % (ref 34.8–46.1)
HGB BLD-MCNC: 7.8 G/DL (ref 11.5–15.4)
IMM GRANULOCYTES # BLD AUTO: 0.02 THOUSAND/UL (ref 0–0.2)
IMM GRANULOCYTES NFR BLD AUTO: 0 % (ref 0–2)
INR PPP: 1.08 (ref 0.84–1.19)
LYMPHOCYTES # BLD AUTO: 1.21 THOUSANDS/ΜL (ref 0.6–4.47)
LYMPHOCYTES NFR BLD AUTO: 21 % (ref 14–44)
MCH RBC QN AUTO: 17.6 PG (ref 26.8–34.3)
MCHC RBC AUTO-ENTMCNC: 26.9 G/DL (ref 31.4–37.4)
MCV RBC AUTO: 66 FL (ref 82–98)
MONOCYTES # BLD AUTO: 0.53 THOUSAND/ΜL (ref 0.17–1.22)
MONOCYTES NFR BLD AUTO: 9 % (ref 4–12)
NEUTROPHILS # BLD AUTO: 3.87 THOUSANDS/ΜL (ref 1.85–7.62)
NEUTS SEG NFR BLD AUTO: 65 % (ref 43–75)
NRBC BLD AUTO-RTO: 0 /100 WBCS
PLATELET # BLD AUTO: 336 THOUSANDS/UL (ref 149–390)
PMV BLD AUTO: 9.8 FL (ref 8.9–12.7)
POTASSIUM SERPL-SCNC: 4.1 MMOL/L (ref 3.5–5.3)
PROTHROMBIN TIME: 14 SECONDS (ref 11.6–14.5)
RBC # BLD AUTO: 4.42 MILLION/UL (ref 3.81–5.12)
SODIUM SERPL-SCNC: 143 MMOL/L (ref 136–145)
UNIT DISPENSE STATUS: NORMAL
UNIT PRODUCT CODE: NORMAL
UNIT RH: NORMAL
WBC # BLD AUTO: 5.91 THOUSAND/UL (ref 4.31–10.16)

## 2020-09-22 PROCEDURE — 80048 BASIC METABOLIC PNL TOTAL CA: CPT | Performed by: STUDENT IN AN ORGANIZED HEALTH CARE EDUCATION/TRAINING PROGRAM

## 2020-09-22 PROCEDURE — 85025 COMPLETE CBC W/AUTO DIFF WBC: CPT | Performed by: STUDENT IN AN ORGANIZED HEALTH CARE EDUCATION/TRAINING PROGRAM

## 2020-09-22 PROCEDURE — NC001 PR NO CHARGE: Performed by: INTERNAL MEDICINE

## 2020-09-22 PROCEDURE — 85610 PROTHROMBIN TIME: CPT | Performed by: STUDENT IN AN ORGANIZED HEALTH CARE EDUCATION/TRAINING PROGRAM

## 2020-09-22 PROCEDURE — 0UDB7ZX EXTRACTION OF ENDOMETRIUM, VIA NATURAL OR ARTIFICIAL OPENING, DIAGNOSTIC: ICD-10-PCS | Performed by: STUDENT IN AN ORGANIZED HEALTH CARE EDUCATION/TRAINING PROGRAM

## 2020-09-22 PROCEDURE — 88305 TISSUE EXAM BY PATHOLOGIST: CPT | Performed by: PATHOLOGY

## 2020-09-22 PROCEDURE — 43239 EGD BIOPSY SINGLE/MULTIPLE: CPT | Performed by: INTERNAL MEDICINE

## 2020-09-22 PROCEDURE — 45378 DIAGNOSTIC COLONOSCOPY: CPT | Performed by: INTERNAL MEDICINE

## 2020-09-22 PROCEDURE — 81025 URINE PREGNANCY TEST: CPT | Performed by: ANESTHESIOLOGY

## 2020-09-22 PROCEDURE — 99238 HOSP IP/OBS DSCHRG MGMT 30/<: CPT | Performed by: INTERNAL MEDICINE

## 2020-09-22 PROCEDURE — C9113 INJ PANTOPRAZOLE SODIUM, VIA: HCPCS | Performed by: STUDENT IN AN ORGANIZED HEALTH CARE EDUCATION/TRAINING PROGRAM

## 2020-09-22 RX ORDER — PROPOFOL 10 MG/ML
INJECTION, EMULSION INTRAVENOUS AS NEEDED
Status: DISCONTINUED | OUTPATIENT
Start: 2020-09-22 | End: 2020-09-22

## 2020-09-22 RX ORDER — SODIUM CHLORIDE 9 MG/ML
INJECTION, SOLUTION INTRAVENOUS CONTINUOUS PRN
Status: DISCONTINUED | OUTPATIENT
Start: 2020-09-22 | End: 2020-09-22

## 2020-09-22 RX ORDER — PROPOFOL 10 MG/ML
INJECTION, EMULSION INTRAVENOUS CONTINUOUS PRN
Status: DISCONTINUED | OUTPATIENT
Start: 2020-09-22 | End: 2020-09-22

## 2020-09-22 RX ORDER — LIDOCAINE HYDROCHLORIDE 10 MG/ML
INJECTION, SOLUTION EPIDURAL; INFILTRATION; INTRACAUDAL; PERINEURAL AS NEEDED
Status: DISCONTINUED | OUTPATIENT
Start: 2020-09-22 | End: 2020-09-22

## 2020-09-22 RX ORDER — FAMOTIDINE 20 MG/1
20 TABLET, FILM COATED ORAL 2 TIMES DAILY
Qty: 60 TABLET | Refills: 0 | Status: SHIPPED | OUTPATIENT
Start: 2020-09-22 | End: 2021-06-30

## 2020-09-22 RX ORDER — MAGNESIUM CARB/ALUMINUM HYDROX 105-160MG
296 TABLET,CHEWABLE ORAL ONCE
Status: COMPLETED | OUTPATIENT
Start: 2020-09-22 | End: 2020-09-22

## 2020-09-22 RX ADMIN — IRON SUCROSE 300 MG: 20 INJECTION, SOLUTION INTRAVENOUS at 12:45

## 2020-09-22 RX ADMIN — POLYETHYLENE GLYCOL 3350 17 G: 17 POWDER, FOR SOLUTION ORAL at 09:06

## 2020-09-22 RX ADMIN — PROPOFOL 40 MG: 10 INJECTION, EMULSION INTRAVENOUS at 13:35

## 2020-09-22 RX ADMIN — SODIUM CHLORIDE: 0.9 INJECTION, SOLUTION INTRAVENOUS at 13:53

## 2020-09-22 RX ADMIN — MAGNESIUM CITRATE 296 ML: 1.75 LIQUID ORAL at 09:07

## 2020-09-22 RX ADMIN — SODIUM CHLORIDE: 0.9 INJECTION, SOLUTION INTRAVENOUS at 13:29

## 2020-09-22 RX ADMIN — SODIUM CHLORIDE, SODIUM GLUCONATE, SODIUM ACETATE, POTASSIUM CHLORIDE, MAGNESIUM CHLORIDE, SODIUM PHOSPHATE, DIBASIC, AND POTASSIUM PHOSPHATE 125 ML/HR: .53; .5; .37; .037; .03; .012; .00082 INJECTION, SOLUTION INTRAVENOUS at 05:00

## 2020-09-22 RX ADMIN — FOLIC ACID 1 MG: 1 TABLET ORAL at 09:07

## 2020-09-22 RX ADMIN — PROPOFOL 120 MCG/KG/MIN: 10 INJECTION, EMULSION INTRAVENOUS at 13:31

## 2020-09-22 RX ADMIN — PROPOFOL 30 MG: 10 INJECTION, EMULSION INTRAVENOUS at 13:56

## 2020-09-22 RX ADMIN — LIDOCAINE HYDROCHLORIDE 80 MG: 10 INJECTION, SOLUTION EPIDURAL; INFILTRATION; INTRACAUDAL; PERINEURAL at 13:31

## 2020-09-22 RX ADMIN — SENNOSIDES 8.6 MG: 8.6 TABLET, FILM COATED ORAL at 09:07

## 2020-09-22 RX ADMIN — PROPOFOL 40 MG: 10 INJECTION, EMULSION INTRAVENOUS at 13:38

## 2020-09-22 RX ADMIN — PROPOFOL 50 MG: 10 INJECTION, EMULSION INTRAVENOUS at 13:32

## 2020-09-22 RX ADMIN — PROPOFOL 120 MG: 10 INJECTION, EMULSION INTRAVENOUS at 13:31

## 2020-09-22 RX ADMIN — PANTOPRAZOLE SODIUM 40 MG: 40 INJECTION, POWDER, FOR SOLUTION INTRAVENOUS at 09:07

## 2020-09-22 NOTE — ANESTHESIA POSTPROCEDURE EVALUATION
Post-Op Assessment Note    CV Status:  Stable  Pain Score: 0    Pain management: adequate     Mental Status:  Sleepy   Hydration Status:  Euvolemic   PONV Controlled:  Controlled   Airway Patency:  Patent      Post Op Vitals Reviewed: Yes      Staff: CRNA         No complications documented      /59 (09/22/20 1413)    Temp      Pulse 78 (09/22/20 1413)   Resp 18 (09/22/20 1413)    SpO2 100 % (09/22/20 1413)

## 2020-09-22 NOTE — DISCHARGE INSTRUCTIONS
Follow up with your primary care provider within 1 week of discharge  The gynecology team at [place] [address] asks that you please call as soon as possible to set up an appointment with [facility]  Anemia   LO QUE NECESITA SABER:   La anemia es cuando el número de glóbulos rojos o la cantidad de [facility] glóbulos rojos es baja  La hemoglobina es kristen proteína que ayuda a transportar el oxígeno a través de fox cuerpo  Los glóbulos rojos usan el luis para crear hemoglobina  La anemia podría desarrollarse si fox cuerpo no tiene suficiente luis  También podría desarrollarse si fox cuerpo no produce suficientes glóbulos rojos o si ellos mueren antes de que fox cuerpo pueda producirlos  INSTRUCCIONES SOBRE EL JOEL HOSPITALARIA:   Llame al 911 o johnny que alguien llame al 911 en cualquiera de los siguientes casos:   · Usted pierde el conocimiento  · Usted tiene un dolor intenso en el pecho  Busque atención médica de inmediato si:   · Usted tiene evacuaciones intestinales oscuras o con nataly  Pregúntele a fox [place] vitaminas y minerales son adecuados para usted  · Darlene síntomas empeoran, aún después del [place]  · Usted tiene preguntas o inquietudes acerca de fox condición o cuidado  Medicamentos:   · Suplementos de luis o ácido fólico  ayudan a aumentar darlene glóbulos rojos y los niveles de hemoglobina  · Inyecciones de vitamina B12  podrían ayudar a aumentar el conteo de darlene glóbulos rojos y a disminuir darlene síntomas  Pregunte a fox médico cómo se inyecta la B12  · [place] darlene medicamentos harinder se le haya indicado  Consulte con fox médico si usted tahmina que fox medicamento no le está ayudando o si presenta efectos secundarios  Infórmele si es alérgico a algún medicamento  Mantenga kristen lista actualizada de los [place], las vitaminas y los productos herbales que araseli  Incluya los siguientes datos de los medicamentos: cantidad, frecuencia y motivo de administración   Rendall Spice con usted la lista o los envases de la píldoras a bridgett citas de seguimiento  Lleve la lista de los medicamentos con usted en ailyn de kristen emergencia  Evite la anemia:  Consuma alimentos sanos altos en luis y vitamina C  Magali Esquivel, vegetales de hojas gabrielle oscuro y frijoles son altos en luis y proteína  La vitamina C ayuda a rankin cuerpo a absorber el luis  Los [employer] en vitamina C incluyen naranjas y otros cítricos  [place]ídale a rankin médico kristen lista de otros alimentos altos en luis y vitamina C  Pregunte si usted necesita llevar kristen dieta especial    Deatrice Shock a bridgett consultas de control con rankin médico según le indicaron  Anote bridgett preguntas para que se acuerde de hacerlas ishmael bridgett visitas  © 2017 [address] Information is for End User's use only and may not be sold, redistributed or otherwise used for commercial purposes  All illustrations and images included in CareNotes® are the copyrighted property of A D A M , Inc  or Derek Light  Esta información es sólo para uso en educación  Rankin intención no es darle un consejo médico sobre enfermedades o tratamientos  Colsulte con rankin [place] farmacéutico antes de seguir cualquier régimen médico para saber si es seguro y efectivo para usted

## 2020-09-22 NOTE — PLAN OF CARE
Problem: PAIN - ADULT  Goal: Verbalizes/displays adequate comfort level or baseline comfort level  Description: Interventions:  - Encourage patient to monitor pain and request assistance  - Assess pain using appropriate pain scale  - Administer analgesics based on type and severity of pain and evaluate response  - Implement non-pharmacological measures as appropriate and evaluate response  - Consider cultural and social influences on pain and pain management  - Notify physician/advanced practitioner if interventions unsuccessful or patient reports new pain  Outcome: Progressing     Problem: INFECTION - ADULT  Goal: Absence or prevention of progression during hospitalization  Description: INTERVENTIONS:  - Assess and monitor for signs and symptoms of infection  - Monitor lab/diagnostic results  - Monitor all insertion sites, i e  indwelling lines, tubes, and drains  - Monitor endotracheal if appropriate and nasal secretions for changes in amount and color  - [place] appropriate cooling/warming therapies per order  - Administer medications as ordered  - Instruct and encourage patient and family to use good hand hygiene technique  - Identify and instruct in appropriate isolation precautions for identified infection/condition  Outcome: Progressing  Goal: Absence of fever/infection during neutropenic period  Description: INTERVENTIONS:  - Monitor WBC    Outcome: Progressing     Problem: SAFETY ADULT  Goal: Patient will remain free of falls  Description: INTERVENTIONS:  - Assess patient frequently for physical needs  -  Identify cognitive and physical deficits and behaviors that affect risk of falls    -  [place] fall precautions as indicated by assessment   - Educate patient/family on patient safety including physical limitations  - Instruct patient to call for assistance with activity based on assessment  - Modify environment to reduce risk of injury  - Consider OT/PT consult to assist with strengthening/mobility  Outcome: Progressing  Goal: Maintain or return to baseline ADL function  Description: INTERVENTIONS:  -  Assess patient's ability to carry out ADLs; assess patient's baseline for ADL function and identify physical deficits which impact ability to perform ADLs (bathing, care of mouth/teeth, toileting, grooming, dressing, etc )  - Assess/evaluate cause of self-care deficits   - Assess range of motion  - Assess patient's mobility; develop plan if impaired  - Assess patient's need for assistive devices and provide as appropriate  - Encourage maximum independence but intervene and supervise when necessary  - Involve family in performance of ADLs  - Assess for home care needs following discharge   - Consider OT consult to assist with ADL evaluation and planning for discharge  - Provide patient education as appropriate  Outcome: Progressing  Goal: Maintain or return mobility status to optimal level  Description: INTERVENTIONS:  - Assess patient's baseline mobility status (ambulation, transfers, stairs, etc )    - Identify cognitive and physical deficits and behaviors that affect mobility  - Identify mobility aids required to assist with transfers and/or ambulation (gait belt, sit-to-stand, lift, walker, cane, etc )  - [place] fall precautions as indicated by assessment  - Record patient progress and toleration of activity level on Mobility SBAR; progress patient to next Phase/Stage  - Instruct patient to call for assistance with activity based on assessment  - Consider rehabilitation consult to assist with strengthening/weightbearing, etc   Outcome: Progressing     Problem: DISCHARGE PLANNING  Goal: Discharge to home or other facility with appropriate resources  Description: INTERVENTIONS:  - Identify barriers to discharge w/patient and caregiver  - Arrange for needed discharge resources and transportation as appropriate  - Identify discharge learning needs (meds, wound care, etc )  - Arrange for interpretive services to assist at discharge as needed  - Refer to Case Management Department for coordinating discharge planning if the patient needs post-hospital services based on physician/advanced practitioner order or complex needs related to functional status, cognitive ability, or social support system  Outcome: Progressing     Problem: Knowledge Deficit  Goal: Patient/family/caregiver demonstrates understanding of disease process, treatment plan, medications, and discharge instructions  Description: Complete learning assessment and assess knowledge base    Interventions:  - Provide teaching at level of understanding  - Provide teaching via preferred learning methods  Outcome: Progressing

## 2020-09-22 NOTE — DISCHARGE SUMMARY
INTERNAL MEDICINE RESIDENCY DISCHARGE SUMMARY     Angel Robin   39 y o  female  MRN: [id number]  Room/Bed: [place] [address]/[facility] [address] [place] [address] GI LAB INTRA   Encounter: [id number]    Principal Problem:    Symptomatic anemia  Active Problems:    Iron deficiency anemia    Blood in stool    Abdominal pain    Hypokalemia      Hypokalemia  Assessment & Plan  3 1 on admission  Repleted with 40mEq KCl once and 20mEq KCl once  Currently 4 1  Monitor BMP    Abdominal pain  Assessment & Plan  Likely due to gastritis  Reports epigastric pain radiating to the back worse with meals   History of over-the-counter NSAID use  Associated melanotic stools  Lipase 106  Plan-  Protonix IV 40 mg b i d  Consult gastroenterology, input appreciated  NPO after midnight for EGD/colonscopy on 9/22       Iron deficiency anemia  Assessment & Plan  Hemoglobin on presentation 6 6 -> 6 0->7 1->7 8 on 9/21 AM   Recent iron studies showed ferritin of 2,Low iron and TIBC 396  Hegb up to 7 8 after 1u pRBC on 7/21  FOBT positive  Current started on her menstrual cycle  Received Venofer x1 on 9/20  Plan :  Gastroenterology and Ob Gyn Consult  See Symptomatic anemia problem for plan    * Symptomatic anemia  Assessment & Plan  Likely due to GI bleed and menorrhagia with abnormal uterine bleeding  Patient Has a history of Iron deficiency anemia for x10yrs   Declined blood transfusion in past   Reported Epigastric pain worse with food radiates to the back, associated with melanotic stools  No previous colonoscopy or EGD  history of menorrhagia and abnormal uterine bleeding previously on OCPs  Reported he easy fatigability, shortness of breath on exertion, palpitations, headache  Patient also reporting exertional chest pain-may be due to chronic anemia vs cardiac source    Hb on presentation 6 6 -> 6 0-> 7 1 ->7 8 on 9/22 AM Called to come to ED on 7/20 because of abnormal results showing iron deficiency anemia and positive FOBT  Discussed with her about blood transfusion on 9/21  After discussion again with GI, she agreed to blood transfusion  Hgb went up to 7 8 after 1u pRBC  Underwent endometrial biopsy with OBGYN on 9/21  Given TXA 1g on 9/21 to slow menstrual bleeding in setting of anemia  -EGD/colonscopy was normal except for a Schatzki ring  Based on EGD/colonoscopy, GI is confident anemia is not a GI source, but likely due to menorrhagia  PLAN  Venofer infusion x1 on 9/20  2nd dose given on 9/22  Gastroenterology, OBGYN consult  Per GI, will undergo EGD/colonscopy on 9/22  Patient agreed to blood transfusion in order to under EGD/colonoscopy, as GI requires a minimum Hgb of 7 for procedure  NPO currently  Folic acid supplementation ordered  [address] COURSE     Patient is a 39yo female with hx of menorrhagia, abnormal uterine bleeding on OCP, and iron-deficiency anemia x10 years who presented after outpatient labs showed a very low hemoglobin  She had described symptoms of fatigue, lightheadedness, SOB with exertion, abdominal pain, dark stools, and hx of heavy uterine bleeding with clots  Hemoglobin in the ED was 6 6 and iron panel was consistent with iron deficiency anemia  FOBT was positive  She was given 1 dose of Venofer and GI and obgyn were consulted  Patient refused blood transfusion upon admission  Patient agreed to a blood transfusion after discussion with GI that Hgb needed to be >7 0 to undergo EGD and colonoscopy  Patient underwent EGD and colonoscopy on 9/22 to rule out GI cause of anemia  EGD/colonoscopy was normal except for a Schatzki ring  GI concluded that source of bleeding is very unlikely to be coming from GI source and is likely 2/2 menorrhagia  GI and obgyn both felt that source of anemia was likely due to mennaghoria   OBGYN performed a bedside endometrial biopsy on 9/21, and also recommended giving patient tranexamic acid to slow bleeding from menses  Patient felt symptoms had improved after receiving IV tranexamic acid  She will follow up with OBGYN as an outpatient  DISCHARGE INFORMATION     PCP at Discharge: Samy Anand [place], [place]    Admitting Provider: Nathen Marcial MD  Admission Date: 9/20/2020    Discharge Provider: Nathen Marcial MD  Discharge Date: 9/22/20    Discharge Disposition: Home/Self Care  Discharge Condition: good  Discharge with Lines: no    Discharge Diet: regular diet  Activity Restrictions: none  Test Results Pending at Discharge: Endometrial biopsy results  Will follow up with obgyn  Discharge Diagnoses:  Principal Problem:    Symptomatic anemia  Active Problems:    Iron deficiency anemia    Blood in stool    Abdominal pain    Hypokalemia  Resolved Problems:    * No resolved hospital problems  *      Consulting Providers: Gastroenterology, OBGYN      Diagnostic & Therapeutic Procedures Performed:  Us Pelvis Complete W Transvaginal    Result Date: 9/21/2020  Impression:  Ill-defined endometrium with a few scattered subendometrial small echogenic foci  The uterus is also somewhat globular in appearance  Findings could suggest adenomyosis  This can be further evaluated with MRI, if clinically warranted  Maximal measured endometrial thickness of 16 mm in this patient with unknown LMP  If the patient is deemed postmenopausal, this should be considered abnormal   Review of electronic medical record reveals that the patient was consented for an endometrial  biopsy earlier today by Dr Cathleen Gibson  Unremarkable ovaries   Workstation performed: [id number]       Code Status: Level 1 - Full Code  Advance Directive & Living Will: <no information>  Power of :    POLST:      Medications:  Current Discharge Medication List        Current Discharge Medication List        Current Discharge Medication List      CONTINUE these medications which have NOT CHANGED    Details   ferrous sulfate 324 (65 Fe) mg Take 1 tablet (324 mg total) by mouth every other day  Qty: 45 tablet, Refills: 3    Associated Diagnoses: Menorrhagia with regular cycle             Allergies:  No Known Allergies    FOLLOW-UP     PCP Outpatient Follow-up:  Call primary care provider and set up appointmetn within 1 week  Consulting Providers Follow-up:  Patient will call to set up an appointment with [facility]     Active Issues Requiring Follow-up:   Anemia, menorrhagia     Discharge Statement:   I spent 30 minutes minutes discharging the patient  This time was spent on the day of discharge  I had direct contact with the patient on the day of discharge  Additional documentation is required if more than 30 minutes were spent on discharge  Portions of the record may have been created with voice recognition software  Occasional wrong word or "sound a like" substitutions may have occurred due to the inherent limitations of voice recognition software    Read the chart carefully and recognize, using context, where substitutions have occurred     ==  Lola Kellogg MD  [address]  Internal Medicine Resident PGY-1

## 2020-09-22 NOTE — PROGRESS NOTES
INTERNAL MEDICINE RESIDENCY PROGRESS NOTE     Name: Jamie Huizar   Age & Sex: 39 y o  female   MRN: [id number]  Unit/Bed#: [facility] 924-01   Encounter: [id number]  Team: SOD Team C     PATIENT INFORMATION     Name: Jamie Huizar   Age & Sex: 39 y o  female   MRN: [id number]  Hospital Stay Days: 2    ASSESSMENT/PLAN     Principal Problem:    Symptomatic anemia  Active Problems:    Iron deficiency anemia    Blood in stool    Abdominal pain    Hypokalemia      Hypokalemia  Assessment & Plan  3 1 on admission  Repleted with 40mEq KCl once and 20mEq KCl once  Currently 4 1  Monitor BMP    Abdominal pain  Assessment & Plan  Likely due to gastritis  Reports epigastric pain radiating to the back worse with meals   History of over-the-counter NSAID use  Associated melanotic stools  Lipase 106  Plan-  Protonix IV 40 mg b i d  Consult gastroenterology, input appreciated  NPO after midnight for EGD/colonscopy on 9/22       Iron deficiency anemia  Assessment & Plan  Hemoglobin on presentation 6 6 -> 6 0->7 1->7 8 on 9/21 AM   Recent iron studies showed ferritin of 2,Low iron and TIBC 396  Hegb up to 7 8 after 1u pRBC on 7/21  FOBT positive  Current started on her menstrual cycle  Received Venofer x1 on 9/20  Plan :  Gastroenterology and Ob Gyn Consult  See Symptomatic anemia problem for plan    * Symptomatic anemia  Assessment & Plan  Likely due to GI bleed and menorrhagia with abnormal uterine bleeding  Patient Has a history of Iron deficiency anemia for x10yrs   Declined blood transfusion in past   Reported Epigastric pain worse with food radiates to the back, associated with melanotic stools  No previous colonoscopy or EGD  history of menorrhagia and abnormal uterine bleeding previously on OCPs  Reported he easy fatigability, shortness of breath on exertion, palpitations, headache  Patient also reporting exertional chest pain-may be due to chronic anemia vs cardiac source    Hb on presentation 6 6 -> 6 0-> 7 1 ->7 8 on  AM Called to come to ED on  because of abnormal results showing iron deficiency anemia and positive FOBT  Discussed with her about blood transfusion on   After discussion again with GI, she agreed to blood transfusion  Hgb went up to 7 8 after 1u pRBC  Underwent endometrial biopsy with OBGYN on   Given TXA 1g on  to slow menstrual bleeding in setting of anemia  PLAN  Venofer infusion x1 on   2nd dose given on   Gastroenterology, OBGYN consult  Per GI, will undergo EGD/colonscopy on   Patient agreed to blood transfusion in order to under EGD/colonoscopy, as GI requires a minimum Hgb of 7 for procedure  NPO currently  Folic acid supplementation ordered  Disposition: Inpatient, awaiting EGD/colonoscopy on      SUBJECTIVE     Patient seen and examined  No acute events overnight  Patient says she is feeling betters  Denies fevers, chills, chest pain, SOB, nausea, vomiting, blood in stool, headache, lightheadedness, or dizziness  OBJECTIVE     Vitals:    20 1630 20 1906 20 2210 20 0704   BP: 136/79 112/81 114/80 133/81   BP Location:  Right arm     Pulse: 78 76 82 77   Resp: 16 16 17 18   Temp: 98 7 °F (37 1 °C) 98 8 °F (37 1 °C) 98 3 °F (36 8 °C) 98 5 °F (36 9 °C)   TempSrc: Oral Oral     SpO2: 98% 98% 100% 100%   Weight:       Height:          Temperature:   Temp (24hrs), Av 7 °F (37 1 °C), Min:98 3 °F (36 8 °C), Max:99 1 °F (37 3 °C)    Temperature: 98 5 °F (36 9 °C)  Intake & Output:  I/O        07 -  0700 701 -  0700  07 -  0700    P  O  0 240     I V  (mL/kg)  2600 (37 1)     Blood  300     IV Piggyback 250 100     Total Intake(mL/kg) 250 (3 6) 3240 (46 3)     Urine (mL/kg/hr) 850 0 (0)     Stool  0     Total Output 850 0     Net -600 +3240            Unmeasured Urine Occurrence  5 x     Unmeasured Stool Occurrence  1 x         Weights:   IBW: 59 3 kg    Body mass index is 24 91 kg/m²  Weight (last 2 days)     Date/Time   Weight    09/20/20 19:51:16   70 (154 32)    09/20/20 1617   69 9 (154)            Physical Exam  Constitutional:       General: She is not in acute distress  HENT:      Head: Normocephalic  Mouth/Throat:      Mouth: Mucous membranes are moist    Eyes:      Comments: Conjunctival pallor   Cardiovascular:      Rate and Rhythm: Normal rate and regular rhythm  Pulses: Normal pulses  Heart sounds: No murmur  No friction rub  No gallop  Pulmonary:      Effort: Pulmonary effort is normal       Breath sounds: Normal breath sounds  No wheezing, rhonchi or rales  Abdominal:      Palpations: Abdomen is soft  Tenderness: There is no abdominal tenderness  There is no guarding or rebound  Musculoskeletal:      Right lower leg: No edema  Left lower leg: No edema  Skin:     Coloration: Skin is not pale  Neurological:      Mental Status: She is alert and oriented to person, place, and time  Psychiatric:         Mood and Affect: Mood normal        LABORATORY DATA     Labs: I have personally reviewed pertinent reports    Results from last 7 days   Lab Units 09/22/20  0537 09/21/20 2001 09/21/20  0527 09/20/20  1700 09/19/20  0935   WBC Thousand/uL 5 91  --  6 20 8 40 5 79   HEMOGLOBIN g/dL 7 8* 7 1* 6 0* 6 6* 6 8*   HEMATOCRIT % 29 0* 25 8* 24 0* 25 6* 26 6*   PLATELETS Thousands/uL 336  --  258 289 229   NEUTROS PCT % 65  --   --  71 60   MONOS PCT % 9  --   --  8 9      Results from last 7 days   Lab Units 09/22/20  0537 09/21/20  0527 09/20/20  1627   POTASSIUM mmol/L 4 1 4 0 3 4*   CHLORIDE mmol/L 112* 112* 110*   CO2 mmol/L 26 26 27   BUN mg/dL 3* 6 9   CREATININE mg/dL 0 53* 0 43* 0 54*   CALCIUM mg/dL 9 1 8 4 8 7   ALK PHOS U/L  --   --  55   ALT U/L  --   --  15   AST U/L  --   --  11              Results from last 7 days   Lab Units 09/22/20  0537   INR  1 08               IMAGING & DIAGNOSTIC TESTING     Radiology Results: I have personally reviewed pertinent reports  Us Pelvis Complete W Transvaginal    Result Date: 9/21/2020  Impression:  Ill-defined endometrium with a few scattered subendometrial small echogenic foci  The uterus is also somewhat globular in appearance  Findings could suggest adenomyosis  This can be further evaluated with MRI, if clinically warranted  Maximal measured endometrial thickness of 16 mm in this patient with unknown LMP  If the patient is deemed postmenopausal, this should be considered abnormal   Review of electronic medical record reveals that the patient was consented for an endometrial  biopsy earlier today by Dr Tonie Mijares  Unremarkable ovaries  Workstation performed: [id number]     Other Diagnostic Testing: I have personally reviewed pertinent reports  ACTIVE MEDICATIONS     Current Facility-Administered Medications   Medication Dose Route Frequency    acetaminophen (TYLENOL) tablet 650 mg  650 mg Oral Q6H PRN    bisacodyl (DULCOLAX) EC tablet 10 mg  10 mg Oral See Admin Instructions    folic acid (FOLVITE) tablet 1 mg  1 mg Oral Daily    iron sucrose (VENOFER) 300 mg in sodium chloride 0 9 % 250 mL IVPB  300 mg Intravenous Once    multi-electrolyte (PLASMALYTE-A/ISOLYTE-S PH 7 4) IV solution  125 mL/hr Intravenous Continuous    ondansetron (ZOFRAN) injection 4 mg  4 mg Intravenous Q6H PRN    pantoprazole (PROTONIX) injection 40 mg  40 mg Intravenous Q12H MURALI    polyethylene glycol (GOLYTELY) bowel prep 4,000 mL  4,000 mL Oral See Admin Instructions    polyethylene glycol (MIRALAX) packet 17 g  17 g Oral Daily    senna (SENOKOT) tablet 8 6 mg  1 tablet Oral Daily       VTE Pharmacologic Prophylaxis: Sequential compression device (Venodyne)   VTE Mechanical Prophylaxis: sequential compression device    Portions of the record may have been created with voice recognition software    Occasional wrong word or "sound a like" substitutions may have occurred due to the inherent limitations of voice recognition software    Read the chart carefully and recognize, using context, where substitutions have occurred   ==  Taco Bailey MD  [address]  Internal Medicine Residency PGY-1

## 2020-09-22 NOTE — ANESTHESIA PREPROCEDURE EVALUATION
Procedure:  COLONOSCOPY  EGD    Relevant Problems   HEMATOLOGY   (+) Iron deficiency anemia   (+) Symptomatic anemia      MUSCULOSKELETAL   (+) Mid back pain      Other   (+) Menorrhagia with regular cycle      Hb 6 0 9/21  Given 1U PRBC, Hb 7 8 this AM      Physical Exam    Airway    Mallampati score: II  TM Distance: >3 FB  Neck ROM: full     Dental   No notable dental hx implants,     Cardiovascular  Rhythm: regular, Rate: normal, Cardiovascular exam normal    Pulmonary  Pulmonary exam normal     Other Findings        Anesthesia Plan  ASA Score- 2     Anesthesia Type- IV sedation with anesthesia with ASA Monitors  Additional Monitors:   Airway Plan:           Plan Factors-Exercise tolerance (METS): >4 METS  Chart reviewed  Existing labs reviewed  Patient summary reviewed  Patient is not a current smoker  Patient did not smoke on day of surgery  Induction- intravenous  Postoperative Plan-     Informed Consent- Anesthetic plan and risks discussed with patient  I personally reviewed this patient with the CRNA  Discussed and agreed on the Anesthesia Plan with the MARA June

## 2020-09-23 ENCOUNTER — TELEPHONE (OUTPATIENT)
Dept: OBGYN CLINIC | Facility: CLINIC | Age: 45
End: 2020-09-23

## 2020-09-24 ENCOUNTER — OFFICE VISIT (OUTPATIENT)
Dept: OBGYN CLINIC | Facility: CLINIC | Age: 45
End: 2020-09-24

## 2020-09-24 ENCOUNTER — TRANSITIONAL CARE MANAGEMENT (OUTPATIENT)
Dept: INTERNAL MEDICINE CLINIC | Facility: CLINIC | Age: 45
End: 2020-09-24

## 2020-09-24 VITALS
WEIGHT: 153 LBS | HEART RATE: 94 BPM | TEMPERATURE: 96.9 F | HEIGHT: 68 IN | BODY MASS INDEX: 23.19 KG/M2 | SYSTOLIC BLOOD PRESSURE: 135 MMHG | DIASTOLIC BLOOD PRESSURE: 80 MMHG

## 2020-09-24 DIAGNOSIS — N93.9 ABNORMAL UTERINE BLEEDING (AUB): Primary | ICD-10-CM

## 2020-09-24 PROCEDURE — 99213 OFFICE O/P EST LOW 20 MIN: CPT | Performed by: STUDENT IN AN ORGANIZED HEALTH CARE EDUCATION/TRAINING PROGRAM

## 2020-09-24 RX ORDER — ACETAMINOPHEN AND CODEINE PHOSPHATE 120; 12 MG/5ML; MG/5ML
1 SOLUTION ORAL DAILY
Qty: 28 TABLET | Refills: 3 | Status: SHIPPED | OUTPATIENT
Start: 2020-09-24 | End: 2021-06-30

## 2020-09-24 NOTE — ASSESSMENT & PLAN NOTE
In the setting of heavy menstrual bleeding  TVUS without structural cause of bleeding  EMB collected and pending  Reviewed options for medical management of abnormal uterine bleeding-OCPs versus depo-provera versus IUD  While she desires definitive surgical management currently, reviewed need to attempt regulating abnormal uterine bleeding by medical means and then moving to definitive surgical management if she were to fail medical management  She elects for trial of OCPs-will prescribe [place] given age and hypertension  Reviewed indications for Mirena placement and literature given for patient to review  She should continue PO iron therapy  RTC 3 months for AUB follow-up, possible Mirena placement      Given AUB as regular heavy menstrual bleeding and BMI of 23, can be a candidate for endometrial ablation if EMB normal

## 2020-09-24 NOTE — PROGRESS NOTES
Symptomatic anemia  In the setting of heavy menstrual bleeding  TVUS without structural cause of bleeding  EMB collected and pending  Reviewed options for medical management of abnormal uterine bleeding-OCPs versus depo-provera versus IUD  While she desires definitive surgical management currently, reviewed need to attempt regulating abnormal uterine bleeding by medical means and then moving to definitive surgical management if she were to fail medical management  She elects for trial of OCPs-will prescribe [place]or given age and hypertension  Reviewed indications for Mirena placement and literature given for patient to review  She should continue PO iron therapy  RTC 3 months for AUB follow-up, possible Mirena placement  Given AUB as regular heavy menstrual bleeding and BMI of 23, can be a candidate for endometrial ablation if EMB normal      Lyssa Aly, DO     Preethi Kiser present during encounter for interpritation    Nadira Estevez is a 40year old  LMP  s/p tubal ligation, with hx heavy menstrual bleeding, who presents for hospital follow-up  She was admitted under the internal medicine in the setting of iron deficiency anemia, notable for hemoglobin of 6 0 at admission, subsequently receiving venofer and 1 units packed red blood cells, with subsequent rise in hemoglobin to 8 7  She underwent an endometrial biopsy during this encounter, as well as a transvaginal ultrasound which showed an ill-defined endometrium of 16 mm, otherwise normal     She reports overall feeling well since discharge from the hospital on Wednesday, currently reporting light vaginal bleeding as her menses are ongoing  She reports current period is much lighter than it normally is  With regards to her heavy menses, she states that it is distressing and that she has been dealing with heavy menses for over a decade    She desires hysterectomy, stating that she is done having heavy periods and wishes to just "have it removed"  She reports taking combined OCPs when she was younger to regulate menses  Vitals:    09/24/20 0847   BP: 135/80   Pulse: 94   Temp: (!) 96 9 °F (36 1 °C)     Physical Exam  Constitutional:       Appearance: Normal appearance  Cardiovascular:      Rate and Rhythm: Normal rate and regular rhythm  Pulses: Normal pulses  Heart sounds: Normal heart sounds  Pulmonary:      Effort: Pulmonary effort is normal       Breath sounds: Normal breath sounds  Abdominal:      General: Abdomen is flat  Bowel sounds are normal       Palpations: Abdomen is soft  Skin:     General: Skin is warm and dry  Neurological:      General: No focal deficit present  Mental Status: She is alert and oriented to person, place, and time  Psychiatric:         Mood and Affect: Mood normal          Behavior: Behavior normal        PELVIC ULTRASOUND, COMPLETE     INDICATION:  39years old  menorrhagia  Unknown LMP      COMPARISON: 4/17/2017     TECHNIQUE:   Transabdominal pelvic ultrasound was performed in sagittal and transverse planes with a curvilinear transducer  Additional transvaginal imaging was performed to better evaluate the endometrium and ovaries  Imaging included volumetric   sweeps as well as traditional still imaging technique      FINDINGS:     UTERUS:  The uterus is anteverted in position, measuring 10 2 x 5 2 x 6 3 cm  Somewhat lobular in appearance  No focal abnormality  The cervix shows no suspicious abnormality      ENDOMETRIUM:    Maximal thickness of 16 mm  Ill-defined, noting a few scattered subendometrial echogenic foci      OVARIES/ADNEXA:  Right ovary:  2 0 x 1 4 x 1 9 cm  No suspicious right ovarian abnormality  Doppler flow within normal limits      Left ovary:  3 8 x 1 2 x 2 3 cm  Identified on transabdominal imaging only  No suspicious left ovarian abnormality    Doppler flow within normal limits      No suspicious adnexal mass or loculated collections      No free pelvic fluid      IMPRESSION:     Ill-defined endometrium with a few scattered subendometrial small echogenic foci  The uterus is also somewhat globular in appearance  Findings could suggest adenomyosis  This can be further evaluated with MRI, if clinically warranted      Maximal measured endometrial thickness of 16 mm in this patient with unknown LMP  If the patient is deemed postmenopausal, this should be considered abnormal   Review of electronic medical record reveals that the patient was consented for an endometrial   biopsy earlier today by Dr Ivonne Vinson      Unremarkable ovaries

## 2020-09-24 NOTE — PATIENT INSTRUCTIONS
Hemorragia uterina disfuncional   LO QUE NECESITA SABER:   La hemorragia uterina disfuncional es un sangrado uterino anormal causado por un problema hormonal  Es posible que fox útero nataly en un momento que no sea fox período menstrual regular  Darlene menstruaciones pueden durar más o ser mas cortos, y podría sangrar kristen mayor o frank cantidad que de costumbre  INSTRUCCIONES SOBRE EL [facility]ITAL[facility]:   Medicamentos:   · Hormonas  hacen que darlene períodos menstruales markel más regulares y contribuyen a disminuir la hemorragia  A veces emily medicamento se administra en forma de pastillas anticonceptivas  · AINEs (Analgésicos antiinflamatorios no esteroides)  ayudan a disminuir la inflamación, el dolor y la [place]  Emily medicamento esta disponible con o sin kristen receta médica  Los AINEs pueden causar sangrado estomacal o problemas renales en ciertas personas  Si usted araseli un medicamento anticoagulante, siempre pregúntele a fox médico si los TYLER son seguros para usted  Siempre eber la etiqueta de meily medicamento y [place] instrucciones  · Pueden administrarle suplementos de luis  podrían administrarse si fox nivel de luis en la nataly disminuye debido a la hemorragia  El luis puede causar estreñimiento  Consulte con fox médico de qué manera puede evitar o tratar el estreñimiento  El luis Cynthia Beau hacer que darlene evacuaciones intestinales markel de color oscuro o negras  · [place] darlene medicamentos harinder se le haya indicado  Consulte con fox médico si usted tahmina que fox medicamento no le está ayudando o si presenta efectos secundarios  Infórmele si es alérgico a cualquier medicamento  Mantenga kristen lista actualizada de los M D ANA  [employer], las vitaminas y los productos herbales que araseli  Incluya los siguientes datos de los medicamentos: cantidad, frecuencia y motivo de administración  Traiga con usted la lista o los envases de la píldoras a darlene citas de seguimiento   Lleve la lista de los medicamentos con usted en ailyn de kristen emergencia  Acuda a bridgett consultas de control con fox médico según le indicaron  Anote bridgett preguntas para que se acuerde de hacerlas ishmael bridgett visitas  Cuidados personales:   · La aplicación de calor  en la parte inferior del abdomen de 20 a 30 minutos cada 2 horas ishmael los días que le indiquen  El calor ayuda a disminuir el dolor y los espasmos musculares  · Consuma alimentos con un alto contenido de luis  si lo necesita  Las verduras de [employer], la carne de res, BOONOO BOONOO, [place], los huevos y los panes y cereales integrales son algunos ejemplos de alimentos con un alto contenido de luis  · Lleve un diario de bridgett períodos menstruales  Anote la cantidad de tampones o toallas higiénicas que [place] a diario  · Consulte con fox médico antes de comenzar un programa para bajar de [place]  Es posible que deba esperar a que la hemorragia anormal se haya detenido para tratar de bajar de [place]  La cantidad de luis en fox nataly debería ser normal antes de [employer]  Pregúntele a fox Maxcine Alan vitaminas y minerales son adecuados para usted  · Necesita cambiar fox toalla higiénica o tampón más de kristen vez por hora  · Fox medicamento le da náuseas, vómitos o diarrea  · Usted tiene preguntas o inquietudes acerca de fox condición o cuidado  Regrese a la nain de emergencias si:   · Continúa sangrando mucho o se siente mareado  © 2017 [address] [employer] Information is for End User's use only and may not be sold, redistributed or otherwise used for commercial purposes  All illustrations and images included in CareNotes® are the copyrighted property of A D A M , Inc  or Derek Light  Esta información es sólo para uso en educación  Fox intención no es darle un consejo médico sobre enfermedades o tratamientos  Colsulte con fox Lesleigh Galileo farmacéutico antes de seguir cualquier régimen médico para saber si es seguro y efectivo para usted

## 2020-09-29 NOTE — RESULT ENCOUNTER NOTE
Please let patient know stomach biopsies showed inflammation and small bowel biopsies were normal, good news

## 2020-10-15 NOTE — UTILIZATION REVIEW
URGENT/EMERGENT  INPATIENT/SPU AUTHORIZATION REQUEST    Date: 10/15/20            # Pages in this Request:     x New Request   Additional Information for PA#:     Office Contact Name:  Moody Dick Title: Utilization Review, [place] Nurse     Phone: [phone]  Ext  Availability (Date/Time): Wednesday - Friday 8 am- 4 pm    x Inpatient Review  SPU Review        Current       x Late Pick-up   · How your facility was first notified of the Late Pick-up:Paths Letter   · When your facility was first notified of the Late Pick-up (date): 10/14/2020         RECIPIENT INFORMATION    Recipient ID#: [id number]   Recipient Name: Monika Bran       YOB: 1975  39 y o  Recipient Alias:     Gender:   Male X Female Medicaid Eligibility ([address] [place] [address]): INSURANCE INFORMATION    (All other private or governmental health insurance benefits must be utilized prior to billing the [place] Program)    Was this admission the result of an MVA, other accident, assault, injury, fall, gunshot, bite etc ? Yes x No                   If yes, provide a brief description of the incident  Does the recipient have other insurance coverage? Yes x No        Insurance Company Name/Policy #      Did that insurance pay on this claim? Yes  No        Did that insurance deny this claim? Yes  No    If yes, reason for denial:      Does the recipient have Medicare? Yes x No        Did Medicare exhaust prior to this admission? Yes  No        Did Medicare partially pay this claim? Yes  No        Did that insurance deny this claim? Yes  No    If yes, reason for denial:          Was the recipient a prisoner at the time of admission?   Yes x No            PROVIDER INFORMATION    Hospital Name: [address] [place] [address] Provider ID#: [id number]    [address] [place] [address] Physician Name: Berta Cotton Provider ID#: [id number]        ADMISSION INFORMATION    Type of Admission: (please choose one)    X ED Direct    If yes, from where? Transfer    If yes, transferring hospital (inpatient, rehab, or psych) Provider Name/Provider ID#: Admission Floor or Unit Type: Med Surg    Dates/Times:        ED Date/Time: 9/20/2020  4:11 PM        Observation Date/Time:         Admission Date/Time: 9/20/20 1843        Discharge or Transfer Date/Time: 9/22/2020  5:18 PM        DIAGNOSIS/PROCEDURE CODES    Primary Diagnosis Code/Primary Diagnosis Code description:  D64 9  Anemia, unspecified     K92 1  Melena     N92 0  Excessive and frequent menstruation with regular cycle     D50 9  Iron deficiency anemia, unspecified     Additional Diagnosis Code(s) and Description(s)-(up to three additional codes):    Procedure Code (one) and description:  [id number]  Inspection of Lower Intestinal Tract, Endo        CLINICAL INFORMATION - PRIOR ADMISSION ONLY    Is there a prior admission with a discharge date within 30 days of the date of this admission? X No (Proceed to the next section - "Clinical Information - General Review Checklist:)      Yes (Provide the following information)     Prior admission dates:    MA Prior Authorization Number:        Review Outcome:     Diagnosis Code(s)/Description:    Procedure Code/Description:    Findings:    Treatment:    Condition on Discharge:   Vitals:    Labs:   Imaging:   Medications: Follow-up Instructions:    Disposition:        CLINICAL INFORMATION - GENERAL REVIEW CHECKLIST    EMERGENCY DEPARTMENT: (Proceed to "ADMISSION" if Direct Admission)    Presenting Signs/Symptoms: Ms Darrell Capone is a 38 yo female who presents to the ED from home with c/o abnormal lab results showing severe iron-deficiency anemia with hgb of 6 6 and mild hypokalemia    She has fatigue, SOB on minimal exertion, palpitations, headaches, dizziness and lightheadedness with menstrual cycle which is heavy and comes with clots, exertional retrosternal chest pain, burning, epigastric and constant abd pain with radiation to back worse with eating and sometimes fasting, dark stools, use of NSAIDs  PMH: menorrhagia, abnormal uterine bleeding previously on OCPs, iron-deficiency anemia x10 yrs  In the ED she was started on IV Venofer  She refuses blood transfusion  She is admitted to INPATIENT status with Symptomatic Anemia - likey r/t GI bleed and menorrhagia with abnormal uterine bleeding - Venofer infusion, iron pills,burning epigastric and constant hemeOnc, OB GYN consult, H H q 12 hrs  Abdominal pain - likely r/t gastritis with melanotic stools - IV Protonix, GI consult,  NPO p MN for possible GI procedure    Hypokalemia 3 1 - replete and trend          Medication/treatment prior to arrival in the ED:    Past Medical History:     Past Medical History:   Diagnosis Date    Anemia        Clinical Exam:    Initial Vital Signs: (Temp, Pulse, Resp, and BP)   ED Triage Vitals [09/20/20 1617]   Temperature Pulse Respirations Blood Pressure SpO2   98 6 °F (37 °C) 82 18 159/84 100 %      Temp Source Heart Rate Source Patient Position - Orthostatic VS BP Location FiO2 (%)   Oral Monitor Sitting Right arm --      Pain Score       6           Pertinent Repeat Vital Signs: (include times they were obtained)  09/21/20 07:21:11    98 6 °F (37 °C)    78    18    132/76    95    100 %    --    --    09/20/20 21:47:06    98 2 °F (36 8 °C)    68    18    132/66    88    100 %    --    --    09/20/20 19:51:16    99 4 °F (37 4 °C)    77    18    159/79    106    100 %    --    --    09/20/20 1900    --    76    16    152/70    101    100 %    None (Room air)    Sitting    09/20/20 1803    --    74    16    131/65    --    100 %    None (Room air)    --        Pertinent Sustained Findings: (include times they were obtained)    Weight in Kilograms:  Wt Readings from Last 1 Encounters:   09/24/20 69 4 kg (153 lb)       Pertinent Labs (results):  Results from last 7 days   Lab Units 09/21/20  0527 09/20/20  1700 09/19/20  0935   WBC Thousand/uL 6 20 8 40 5 79   HEMOGLOBIN g/dL 6 0* 6 6* 6 8*   HEMATOCRIT % 24 0* 25 6* 26 6*   PLATELETS Thousands/uL 258 289 229   NEUTROS ABS Thousands/µL  --  5 99 3 52                Results from last 7 days   Lab Units 09/21/20  0527 09/20/20  1627   SODIUM mmol/L 142 141   POTASSIUM mmol/L 4 0 3 4*   CHLORIDE mmol/L 112* 110*   CO2 mmol/L 26 27   ANION GAP mmol/L 4 4   BUN mg/dL 6 9   CREATININE mg/dL 0 43* 0 54*   EGFR ml/min/1 73sq m 123 114   CALCIUM mg/dL 8 4 8 7           Results from last 7 days   Lab Units 09/20/20  1627   AST U/L 11   ALT U/L 15   ALK PHOS U/L 55   TOTAL PROTEIN g/dL 7 6   ALBUMIN g/dL 3 8   TOTAL BILIRUBIN mg/dL 0 42                Results from last 7 days   Lab Units 09/21/20  0527 09/20/20  1627   GLUCOSE RANDOM mg/dL 84 110           Results from last 7 days   Lab Units 09/19/20  0935   FERRITIN ng/mL 2*           Results from last 7 days   Lab Units 09/20/20  1627   LIPASE u/L 106        Radiology (results):    EKG (results): Other tests (results):    Tests pending final results:    Treatment in the ED:   Medication Administration from 09/20/2020 1600 to 09/20/2020 1939       Date/Time Order Dose Route Action Comments     09/20/2020 1811 iron sucrose (VENOFER) 300 mg in sodium chloride 0 9 % 250 mL IVPB 300 mg Intravenous New Bag            Other treatments:      Change in condition while in the ED:     Response to ED Treatment:          OBSERVATION: (Proceed to "ADMISSION" if Direct Admission)    Orders written during the observation period  Meds Name, dose, route, time, how may doses given:  PRN Meds Name, dose, route, time, how many doses given within the first 24 hrs :  IVs Type, rate, and total amt  ordered/given:  Labs, imaging, other:  Consults and findings:    Test Results during the observation period  Pertinent Lab tests (dates/results):  Culture results (blood, urine, spinal, wound, respiratory, etc ):  Imaging tests (dates/results):  EKG (dates/results):   Other test (dates/results):  Tests pending (dates/results):    Surgical or Invasive Procedures during the observation period  Name of surgery/procedure:  Date & Time:  Patient Response:  Post-operative orders:  Operative Report/Findings:    Response to Treatment, Major Change in Condition, Major Charge in Treatment during the observation period          ADMISSION:    DIRECT Admissions Only:    · Presenting Signs/Symptoms:   ·   · Medication/treatment prior to arrival:  ·   · Past Medical History:  ·   · Clinical Exam on admission:  ·   · Vital Signs on admission: (Temp, Pulse, Resp, and BP)  ·   · Weight in kilograms:     ALL Admissions:    Admission Orders and Other Orders written within the first 24 hrs after admission    SCDs  Ambulate q shift   9/21 diet cl liq   9/22 NPO p MN for colonoscopy and EGD     Meds Name, dose, route, time, how may doses given:  bisacodyl, 10 mg, Oral  folic acid, 1 mg, Oral, Daily  [START ON 9/22/2020] iron sucrose, 200 mg, Intravenous, Once  pantoprazole, 40 mg, Intravenous, Q12H [address]  polyethylene glycol, 4,000 mL,   polyethylene glycol, 17 g, Oral, Daily  senna, 1 tablet, Oral, Daily  1 unit of PRBC  On 9/21/2020     PRN Meds Name, dose, route, time, how many doses given within the first 24 hrs :  acetaminophen, 650 mg, Oral, Q6H PRN - x 1 9/20, 9/211  ondansetron, 4 mg, Intravenous, Q6H PRN       IVs Type, rate, and total amt  Ordered/given:  multi-electrolyte, 125 mL/hr, Intravenous, Continuous  Labs, imaging, other:      Consults and findings:9/21 Heme-Onc Consult - pt has fear of blood transfusion reaction and after it was discussed with her she will agree to transfusion if necessary  She may need malabsorptive workup  Oral supplementation, or IV Venofer x 5 doses  Will need OP f/u with hematology group  Gastroenterology - 9/21 - symptomatic anemia iron deficiency  Vs menorrhagia, Pt does reports some abdominal complaints and dark stools    Plan EGD - colonoscopy - Continue PPI avoid NSAIDs     OB GYN -  - 39 y o    female with tubal ligation for contraception admitted for symptomatic anemia likely secondary to menorrhagia given this is a chronic anemia with a chronic history of very heavy menses  Patient also is being worked up for GI bleeding by Gastroenterology for EGD and colonoscopy tomorrow      1  Menorrhagia and symptomatic anemia              - Hg 6 0- patient now agreeable to transfusion, heme onc following              - Endometrial biopsy completed at bedside today-please see separate note              - Transvaginal pelvic ultrasound ordered to assess architecture uterus              - Patient is not a good candidate for OCPs given her age and it appears in addition she has elevated blood pressures here while in hospital   She would be a candidate for Aygestin or TX a while in hospital   At this time patient does not have very heavy menstrual bleeding- would recommend TXA 1 g IV over 10 minutes if you would like to slow her vaginal bleeding down secondary to anemia  - we reviewed other treatments for menorrhagia including Depo-Provera, hormonal IUD and surgery- patient would like to follow up outpatient to discuss treatment further in office  She would be an excellent candidate for Mirena IUD        Test Results after admission  Pertinent Lab tests (dates/results):  Culture results (blood, urine, spinal, wound, respiratory, etc ):  Imaging tests (dates/results):  EKG (dates/results): Other test (dates/results):  Tests pending (dates/results):    Surgical or Invasive Procedures  Name of surgery/procedure: EGD - colonoscopy   Date & Time: 2020  Patient Response:  Tolerated   Post-operative orders: same   Operative Report/Findings:  Colonoscopy -  - Impression: Normal colonoscopy  No evidence of bleeding noted   RECOMMENDATION:  Repeat in 10 years  Regular diet  EGD -  - Impression: Nonobstructing Schatzki ring in the lower 3rd of the esophagus  Otherwise normal EGD; duodenal and gastric biopsies obtained to rule out celiac disease and H pylori, respectively   RECOMMENDATION:  Follow-up biopsies   Response to Treatment, Major Change in Condition, Major Charge in Treatment anytime during admission  [address] [place] [address]      Patient is a 39yo female with hx of menorrhagia, abnormal uterine bleeding on OCP, and iron-deficiency anemia x10 years who presented after outpatient labs showed a very low hemoglobin  She had described symptoms of fatigue, lightheadedness, SOB with exertion, abdominal pain, dark stools, and hx of heavy uterine bleeding with clots  Hemoglobin in the ED was 6 6 and iron panel was consistent with iron deficiency anemia  FOBT was positive  She was given 1 dose of Venofer and GI and obgyn were consulted  Patient refused blood transfusion upon admission      Patient agreed to a blood transfusion after discussion with GI that Hgb needed to be >7 0 to undergo EGD and colonoscopy  Patient underwent EGD and colonoscopy on 9/22 to rule out GI cause of anemia  EGD/colonoscopy was normal except for a Schatzki ring  GI concluded that source of bleeding is very unlikely to be coming from GI source and is likely 2/2 menorrhagia       GI and obgyn both felt that source of anemia was likely due to mennaghoria  OBGYN performed a bedside endometrial biopsy on 9/21, and also recommended giving patient tranexamic acid to slow bleeding from menses  Patient felt symptoms had improved after receiving IV tranexamic acid       She will follow up with OBGYN as an outpatient       Disposition on Discharge  Home, Rehab, SNF, LTC, Shelter, etc : Home/Self Care    Cease to Breathe (CTB)  If a patient expires during an admission, in addition to the above information, please include:    Summary/timeline of the patient's decline in condition:    Medications and treatment:    Patient response to treatment:    Date and time patient ceased to breathe:        Is there a Readmission that follows this admission? Yes x No    If yes, provide dates:          InterQual Review    InterQual Criteria Met: x Yes  No  N/A        Please include the InterQual Review, InterQual year/version used, and the criteria selected:   Created Using  Review Status  Review Entered    Sandata   In Primary  9/21/2020 11:45         Criteria Set Name - Subset    LOC:Acute Adult-Anemia/Bleeding        Criteria Review    REVIEW SUMMARY     Patient: Christie Sanders  Review Number: [id number]  Review Status:  In Primary  Criteria Status: Acute Met  Day Met: Episode Day 1     Condition Specific: Yes        OUTCOMES  Outcome Type: Primary           REVIEW DETAILS     Product: Nicholas Mow Adult  Subset: Anemia/Bleeding      (Symptom or finding within 24h)         (Excludes PO medications unless noted)          [X] Select Day, One:              [X] Episode Day 1, One:                  [X] ACUTE, >= One:                      [ ] Anemia, unknown etiology and, Both:                          [X] Finding, >= One:                              [X] Hct < 30%(0 30) or Hb < 10 0 g/dL(100 g/L) and, >= One:                                  [X] Dyspnea                                  [X] Presyncope or syncope                          [ ] Intervention, Both:                              [X] Hct or Hb monitoring at least 2x/24h                      [X] Lower gastrointestinal (GI) bleeding and, All:                          [X] Hematochezia or melena                          [X] Hct < 30%(0 30) or Hb < 10 0 g/dL(100 g/L) and, >= One:                              [X] Presyncope or syncope                          [X] Hct or Hb monitoring at least 2x/24h and, Both:                              [X] Imaging, >= One:                                  [X] Colonoscopy scheduled or performed within 24h                              [X] IV fluid or blood product transfusion     Version: InterQual® 2019 1  Kellie Romo  © 2019 [employer] and/or one of its [place]  All Rights Reserved  CPT only © 2018 [employer]  All Rights Reserved  PLEASE SUBMIT THE COMPLETED FORM TO THE DEPARTMENT OF HUMAN SERVICES - DIVISION OF CLINICAL  REVIEW VIA FAX -[phone] or VIA E-MAIL TO [email or web address]    Signature: Madhu Lockett Date:  10/15/20    Confidentiality Notice: The documents accompanying this telecopy may contain confidential information belonging to the sender  The information is intended only for the use of the individual named above  If you are not the intended recipient, you are hereby notified  That any disclosure, copying, distribution or taking of any telecopy is strictly prohibited

## 2020-12-15 ENCOUNTER — TELEMEDICINE (OUTPATIENT)
Dept: INTERNAL MEDICINE CLINIC | Facility: CLINIC | Age: 45
End: 2020-12-15

## 2020-12-15 DIAGNOSIS — Z20.822 SUSPECTED COVID-19 VIRUS INFECTION: Primary | ICD-10-CM

## 2020-12-15 DIAGNOSIS — Z20.822 SUSPECTED COVID-19 VIRUS INFECTION: ICD-10-CM

## 2020-12-15 PROCEDURE — 87637 SARSCOV2&INF A&B&RSV AMP PRB: CPT | Performed by: STUDENT IN AN ORGANIZED HEALTH CARE EDUCATION/TRAINING PROGRAM

## 2020-12-15 PROCEDURE — G2012 BRIEF CHECK IN BY MD/QHP: HCPCS | Performed by: HOSPITALIST

## 2020-12-18 LAB
FLUAV RNA NPH QL NAA+PROBE: NOT DETECTED
FLUBV RNA NPH QL NAA+PROBE: NOT DETECTED
RSV RNA NPH QL NAA+PROBE: NOT DETECTED
SARS-COV-2 RNA NPH QL NAA+PROBE: DETECTED

## 2020-12-21 ENCOUNTER — TELEMEDICINE (OUTPATIENT)
Dept: INTERNAL MEDICINE CLINIC | Facility: CLINIC | Age: 45
End: 2020-12-21

## 2020-12-21 DIAGNOSIS — U07.1 COVID-19 VIRUS DETECTED: Primary | ICD-10-CM

## 2020-12-21 PROCEDURE — 99213 OFFICE O/P EST LOW 20 MIN: CPT | Performed by: INTERNAL MEDICINE

## 2020-12-21 PROCEDURE — 1036F TOBACCO NON-USER: CPT | Performed by: INTERNAL MEDICINE

## 2020-12-21 RX ORDER — MULTIVIT WITH MINERALS/LUTEIN
250 TABLET ORAL DAILY
COMMUNITY
End: 2021-06-30

## 2020-12-22 ENCOUNTER — TELEPHONE (OUTPATIENT)
Dept: INTERNAL MEDICINE CLINIC | Facility: CLINIC | Age: 45
End: 2020-12-22

## 2021-01-14 ENCOUNTER — TELEPHONE (OUTPATIENT)
Dept: INTERNAL MEDICINE CLINIC | Facility: CLINIC | Age: 46
End: 2021-01-14

## 2021-01-14 NOTE — TELEPHONE ENCOUNTER
I call the patient back ,I ask the patient what symptoms she is having ,nicole is having chest pain left arm pain ,dizziness ,neck pain ,palpitations,patijulian feel a little sob  Per patient is worsening today hand she feel shakiness  patient said the she never experience nothing like that before   She scare to laying down  I recommend patient to go to the ED  Patient is going to the ED now

## 2021-01-14 NOTE — TELEPHONE ENCOUNTER
PATIENT CALLED IN  STATING  SHES BEEN HAVING R ARM PAIN,DIZZY AND HER HEART RACING FOR THE PAS T TWO WEEK AND ITS GETTING WORSE I  SCHEDULE HER  APPOINTMENT FOR 01/15/20 WITH PC AND TRANSFERRED TO ISELA Mccall

## 2021-01-26 ENCOUNTER — OFFICE VISIT (OUTPATIENT)
Dept: OBGYN CLINIC | Facility: CLINIC | Age: 46
End: 2021-01-26

## 2021-01-26 VITALS
HEIGHT: 68 IN | WEIGHT: 168 LBS | DIASTOLIC BLOOD PRESSURE: 85 MMHG | BODY MASS INDEX: 25.46 KG/M2 | HEART RATE: 79 BPM | SYSTOLIC BLOOD PRESSURE: 131 MMHG

## 2021-01-26 DIAGNOSIS — N92.0 MENORRHAGIA WITH REGULAR CYCLE: Primary | ICD-10-CM

## 2021-01-26 DIAGNOSIS — D50.9 IRON DEFICIENCY ANEMIA, UNSPECIFIED IRON DEFICIENCY ANEMIA TYPE: ICD-10-CM

## 2021-01-26 PROCEDURE — 1036F TOBACCO NON-USER: CPT | Performed by: OBSTETRICS & GYNECOLOGY

## 2021-01-26 PROCEDURE — 3008F BODY MASS INDEX DOCD: CPT | Performed by: OBSTETRICS & GYNECOLOGY

## 2021-01-26 PROCEDURE — 99214 OFFICE O/P EST MOD 30 MIN: CPT | Performed by: OBSTETRICS & GYNECOLOGY

## 2021-01-26 RX ORDER — ACETAMINOPHEN AND CODEINE PHOSPHATE 120; 12 MG/5ML; MG/5ML
1 SOLUTION ORAL DAILY
Qty: 28 TABLET | Refills: 4 | Status: SHIPPED | OUTPATIENT
Start: 2021-01-26 | End: 2021-11-08 | Stop reason: ALTCHOICE

## 2021-01-26 RX ORDER — FERROUS SULFATE TAB EC 324 MG (65 MG FE EQUIVALENT) 324 (65 FE) MG
324 TABLET DELAYED RESPONSE ORAL
Qty: 90 TABLET | Refills: 3 | Status: SHIPPED | OUTPATIENT
Start: 2021-01-26 | End: 2021-11-08 | Stop reason: ALTCHOICE

## 2021-01-27 NOTE — ASSESSMENT & PLAN NOTE
- Last Hgb level 7 8  - Since the blood loss significantly reduced with Micronor, we decide continue Iron Po BID and recheck Hgb level in 4 months    - Reviewed anemia signs and symptoms

## 2021-01-27 NOTE — PROGRESS NOTES
[place]  Sahara Johnson 39 y o  SUBJECTIVE    CC:  Vaginal bleeding well controlled with hormone"    HPI: Sahara Johnson is a 39 y o   female presenting today for acute office visit for  following up Micronor treatment for menorrhagia  She  Is reporting benefit from Micronor that reduce bleeding  significantly with no side effects until now  Decline IUD due to her experience  She agree to use regular Iron pills  The following portions of the patient's history were reviewed and updated as appropriate: allergies, current medications, past family history, past medical history, past social history, past surgical history and problem list         ROS  General: negative for chills or fever   Respiratory: no cough, shortness of breath, or wheezing  Cardiovascular: no chest pain or dyspnea on exertion  Gastrointestinal: no abdominal pain, change in bowel habits, or black or bloody stools  Urinary: no urinary symptoms  Genitourinary: Negative  Neurological: no TIA or stroke symptoms      OBJECTIVE  Vitals:    01/26/21 1539   BP: 131/85   Pulse: 79   Weight: 76 2 kg (168 lb)   Height: 5' 8" (1 727 m)       General appearance: alert and oriented, in no acute distress        Lab Results:   No visits with results within 1 Day(s) from this visit  Latest known visit with results is:   Orders Only on 12/15/2020   Component Date Value    SARS-COV-2 12/15/2020 Detected*    INFLUENZA A PCR 12/15/2020 Not Detected     INFLUENZA B PCR 12/15/2020 Not Detected     RSV PCR 12/15/2020 Not Detected               ASSESSMENT&PLAN    Menorrhagia with regular cycle  - Patient is on progestin only pill as treatment of menorrhagia  She endorse well toleration and no side effects up to now   She desire to continue same medical treatment to reduce bleeding amount  - Endometrial biopsy jer 2020l, PAP smear normal 2019  - I have discussed patient options; hormonal and surgical  Hormonal option IUD with progestin, OCP, depo provera, cyclic progesterone  Surgical endometrial ablation, hysterectomy  - Plan: On going Micronor treatment is working well however, the concern for missing pill and possible side effects reevaluated with patient  We have decided to use a daily pill remainder phone [place]  And note the side effects  We will continue 4 more months and recheck with her  We may discontinue and give a medication free period after 6 months completion  Iron deficiency anemia  - Last Hgb level 7 8  - Since the blood loss significantly reduced with Micronor, we decide continue Iron Po BID and recheck Hgb level in 4 months  - Reviewed anemia signs and symptoms          All questions were answered &  expressed understanding      Patient was seen and discussed with Dr Ernesto Hawley MD  OBGYN PGY-3  1/27/2021

## 2021-01-27 NOTE — ASSESSMENT & PLAN NOTE
- Patient is on progestin only pill as treatment of menorrhagia  She endorse well toleration and no side effects up to now  She desire to continue same medical treatment to reduce bleeding amount  - Endometrial biopsy jer 2020l, PAP smear normal 2019  - I have discussed patient options; hormonal and surgical  Hormonal option IUD with progestin, OCP, depo provera, cyclic progesterone  Surgical endometrial ablation, hysterectomy  - Plan: On going Micronor treatment is working well however, the concern for missing pill and possible side effects reevaluated with patient  We have decided to use a daily pill remainder phone [place]ville  And note the side effects  We will continue 4 more months and recheck with her  We may discontinue and give a medication free period after 6 months completion

## 2021-06-30 ENCOUNTER — TELEMEDICINE (OUTPATIENT)
Dept: INTERNAL MEDICINE CLINIC | Facility: CLINIC | Age: 46
End: 2021-06-30

## 2021-06-30 DIAGNOSIS — R11.0 NAUSEA: ICD-10-CM

## 2021-06-30 DIAGNOSIS — R68.81 EARLY SATIETY: ICD-10-CM

## 2021-06-30 DIAGNOSIS — R10.13 EPIGASTRIC PAIN: Primary | ICD-10-CM

## 2021-06-30 PROBLEM — K92.1 BLOOD IN STOOL: Status: RESOLVED | Noted: 2020-09-14 | Resolved: 2021-06-30

## 2021-06-30 PROBLEM — R10.9 ABDOMINAL PAIN: Status: RESOLVED | Noted: 2020-09-20 | Resolved: 2021-06-30

## 2021-06-30 PROBLEM — K22.2 SCHATZKI'S RING: Status: ACTIVE | Noted: 2021-06-30

## 2021-06-30 PROCEDURE — T1015 CLINIC SERVICE: HCPCS | Performed by: PHYSICIAN ASSISTANT

## 2021-06-30 PROCEDURE — G2012 BRIEF CHECK IN BY MD/QHP: HCPCS | Performed by: PHYSICIAN ASSISTANT

## 2021-06-30 RX ORDER — ONDANSETRON 4 MG/1
4 TABLET, FILM COATED ORAL EVERY 8 HOURS PRN
Qty: 20 TABLET | Refills: 0 | Status: SHIPPED | OUTPATIENT
Start: 2021-06-30 | End: 2021-07-14

## 2021-06-30 RX ORDER — OMEPRAZOLE 20 MG/1
20 CAPSULE, DELAYED RELEASE ORAL DAILY
Qty: 14 CAPSULE | Refills: 0 | Status: SHIPPED | OUTPATIENT
Start: 2021-06-30 | End: 2021-07-14

## 2021-06-30 NOTE — PROGRESS NOTES
Virtual Brief Visit    Assessment/Plan:    Problem List Items Addressed This Visit        Other    Epigastric pain - Primary    Relevant Medications    ondansetron (ZOFRAN) 4 mg tablet    omeprazole (PriLOSEC) 20 mg delayed release capsule    Nausea    Relevant Medications    ondansetron (ZOFRAN) 4 mg tablet    omeprazole (PriLOSEC) 20 mg delayed release capsule      Other Visit Diagnoses     Early satiety        Relevant Medications    ondansetron (ZOFRAN) 4 mg tablet    omeprazole (PriLOSEC) 20 mg delayed release capsule        47y/o female with hx of schatzki's ring and inactive gastritis on EGD 9/2020, presenting today for telephone counter for discussion of GI sxs as described below  Acute onset and 3 days of vomiting, diarrhea x 1 week, which she reports resolved, seems like possible viral gastroenteritis  Persistent nausea and epigastric discomfort, fullness since then, possibly flare of gastritis from vomiting  First, Will trial zofran 4mg q8 hours prn nausea, as well as omeprazole 20mg daily x 14 days to calm down stomach and esophageal inflammation  I did advised following a brat diet, avoiding spicy foods or foods that have a lot of acid, alcohol and NSAIDs  Will have Staff contact pt to set up in -office f/u in 1-2 weeks  Patient understands that she may need of further workup if symptoms continue  She was advised to contact the office sooner with any concerns or worsening symptoms despite treatment  Reason for visit is   Chief Complaint   Patient presents with    Nausea     Per patient the diarrhea and vomits has been resolved   Just feeling nauseous sometimes    Abdominal Pain     Comes and goes    Virtual Brief Visit        Encounter provider Callie Car PA-C    Provider located at [facility] [address]  [address]  [address] [facility] [address] [phone]    Recent Visits  No visits were found meeting these conditions  Showing recent visits within past 7 days and meeting all other requirements  Today's Visits  Date Type Provider Dept   06/30/21 Telemedicine Meghana Ordoñez, [address] [facility] today's visits and meeting all other requirements  Future Appointments  No visits were found meeting these conditions  Showing future appointments within next 150 days and meeting all other requirements       After connecting through [employer] and patient was informed that this is a secure, HIPAA-compliant platform  She agrees to proceed  , the patient was identified by name and date of birth  Serjio Butler was informed that this is a telemedicine visit and that the visit is being conducted through [address] [place] [address] Now and patient was informed that this is a secure, HIPAA-compliant platform  She agrees to proceed     My office door was closed  The patient was notified the following individuals were present in the room  - [employer] TELEPHONE  USED FOR [employer] APPT  She acknowledged consent and understanding of privacy and security of the platform  The patient has agreed to participate and understands she can discontinue the visit at any time  It was my intent to perform this visit via video technology but the patient was not able to do a video connection so the visit was completed via audio telephone only  Patient is aware this is a billable service  Subjective    Serjio Butler is a 55 y o  female presenting for virtual appt for GI sxs   47y/o female here for telephone encounter for GI concerns  Pt states a few weeks ago started getting N/V/D  States most of the sxs are gone since calling to schedule appt   states the nausea is all the time and cannot stand the smell of anything  States the diarrhea lasted for 1 week  Vomiting was for about 3 days  She denies any active abdominal pain, but has been having abd pain that comes and goes       She also reports she cannot eat anything with the nausea  She states she barely eats, but she gets full quickly, causing the stomach ache  States the stomach pain is the same as the stomach ache after eating  States she may get stomach ache on empty stomach or after eating  States she thinks they may be from the iron pills, as she has had this before  States she only takes the iron pills when her stomach feels good  States she had a fever last week (didn't check temperature, but felt hot), occasional chills  She is not aware of direct covid exposure or any risk for exposure to covid 19  She did not get covid vaccine  She does have hx of GI sxs last year - had EGD/colonoscopy last year  Uncomplicated Schatzki's ring, chronic inactive gastritis  Past Medical History:   Diagnosis Date    Anemia        Past Surgical History:   Procedure Laterality Date    TUBAL LIGATION         Current Outpatient Medications   Medication Sig Dispense Refill    norethindrone (MICRONOR) 0 35 MG tablet Take 1 tablet (0 35 mg total) by mouth daily 28 tablet 4    ascorbic acid (VITAMIN C) 250 mg tablet Take 250 mg by mouth daily (Patient not taking: Reported on 6/30/2021)      ferrous sulfate 324 (65 Fe) mg Take 1 tablet (324 mg total) by mouth every other day (Patient not taking: Reported on 1/26/2021) 45 tablet 3    ferrous sulfate 324 (65 Fe) mg Take 1 tablet (324 mg total) by mouth 2 (two) times a day before meals (Patient not taking: Reported on 6/30/2021) 90 tablet 3    norethindrone (MICRONOR) 0 35 MG tablet Take 1 tablet (0 35 mg total) by mouth daily (Patient not taking: Reported on 6/30/2021) 28 tablet 3    omeprazole (PriLOSEC) 20 mg delayed release capsule Take 1 capsule (20 mg total) by mouth daily for 14 days 14 capsule 0    ondansetron (ZOFRAN) 4 mg tablet Take 1 tablet (4 mg total) by mouth every 8 (eight) hours as needed for nausea 20 tablet 0     No current facility-administered medications for this visit  No Known Allergies    Review of Systems   Constitutional:        As in HPI   Respiratory: Negative  Cardiovascular: Negative  Gastrointestinal:        As in HPI   Genitourinary: Negative  Musculoskeletal: Negative  Skin: Negative  Neurological: Negative  Vitals:     PATIENT COULD NOT GET VIDEO TO WORK, TELEPHONE ENCOUNTER ONLY  PT TALKING COMFORTABLY TO  IN [place] NOT  SOUNDING IN ANY ACUTE DISTRESS OR DISCOMFORT  I spent 35 minutes directly with the patient during this visit    VIRTUAL VISIT DISCLAIMER    Júnior Veliz acknowledges that she has consented to an online visit or consultation  She understands that the online visit is based solely on information provided by her, and that, in the absence of a face-to-face physical evaluation by the physician, the diagnosis she receives is both limited and provisional in terms of accuracy and completeness  This is not intended to replace a full medical face-to-face evaluation by the physician  Júniro Veliz understands and accepts these terms

## 2021-07-01 ENCOUNTER — APPOINTMENT (OUTPATIENT)
Dept: LAB | Facility: HOSPITAL | Age: 46
End: 2021-07-01
Payer: COMMERCIAL

## 2021-07-01 DIAGNOSIS — N92.0 MENORRHAGIA WITH REGULAR CYCLE: ICD-10-CM

## 2021-07-01 LAB
BASOPHILS # BLD AUTO: 0.05 THOUSANDS/ΜL (ref 0–0.1)
BASOPHILS NFR BLD AUTO: 1 % (ref 0–1)
EOSINOPHIL # BLD AUTO: 0.15 THOUSAND/ΜL (ref 0–0.61)
EOSINOPHIL NFR BLD AUTO: 3 % (ref 0–6)
ERYTHROCYTE [DISTWIDTH] IN BLOOD BY AUTOMATED COUNT: 20 % (ref 11.6–15.1)
HCT VFR BLD AUTO: 26.7 % (ref 34.8–46.1)
HGB BLD-MCNC: 7 G/DL (ref 11.5–15.4)
IMM GRANULOCYTES # BLD AUTO: 0.01 THOUSAND/UL (ref 0–0.2)
IMM GRANULOCYTES NFR BLD AUTO: 0 % (ref 0–2)
LYMPHOCYTES # BLD AUTO: 1.43 THOUSANDS/ΜL (ref 0.6–4.47)
LYMPHOCYTES NFR BLD AUTO: 31 % (ref 14–44)
MCH RBC QN AUTO: 16.4 PG (ref 26.8–34.3)
MCHC RBC AUTO-ENTMCNC: 26.2 G/DL (ref 31.4–37.4)
MCV RBC AUTO: 62 FL (ref 82–98)
MONOCYTES # BLD AUTO: 0.55 THOUSAND/ΜL (ref 0.17–1.22)
MONOCYTES NFR BLD AUTO: 12 % (ref 4–12)
NEUTROPHILS # BLD AUTO: 2.45 THOUSANDS/ΜL (ref 1.85–7.62)
NEUTS SEG NFR BLD AUTO: 53 % (ref 43–75)
NRBC BLD AUTO-RTO: 0 /100 WBCS
PLATELET # BLD AUTO: 303 THOUSANDS/UL (ref 149–390)
PMV BLD AUTO: 9.4 FL (ref 8.9–12.7)
RBC # BLD AUTO: 4.28 MILLION/UL (ref 3.81–5.12)
WBC # BLD AUTO: 4.64 THOUSAND/UL (ref 4.31–10.16)

## 2021-07-01 PROCEDURE — 36415 COLL VENOUS BLD VENIPUNCTURE: CPT

## 2021-07-01 PROCEDURE — 85025 COMPLETE CBC W/AUTO DIFF WBC: CPT

## 2021-07-14 ENCOUNTER — OFFICE VISIT (OUTPATIENT)
Dept: INTERNAL MEDICINE CLINIC | Facility: CLINIC | Age: 46
End: 2021-07-14

## 2021-07-14 VITALS
TEMPERATURE: 98 F | BODY MASS INDEX: 25.31 KG/M2 | WEIGHT: 167 LBS | SYSTOLIC BLOOD PRESSURE: 125 MMHG | HEART RATE: 80 BPM | HEIGHT: 68 IN | DIASTOLIC BLOOD PRESSURE: 75 MMHG | OXYGEN SATURATION: 99 %

## 2021-07-14 DIAGNOSIS — R10.13 EPIGASTRIC PAIN: Primary | ICD-10-CM

## 2021-07-14 DIAGNOSIS — M25.552 ACUTE HIP PAIN, LEFT: ICD-10-CM

## 2021-07-14 DIAGNOSIS — Z12.31 ENCOUNTER FOR SCREENING MAMMOGRAM FOR MALIGNANT NEOPLASM OF BREAST: ICD-10-CM

## 2021-07-14 DIAGNOSIS — D50.9 IRON DEFICIENCY ANEMIA, UNSPECIFIED IRON DEFICIENCY ANEMIA TYPE: ICD-10-CM

## 2021-07-14 PROCEDURE — T1015 CLINIC SERVICE: HCPCS | Performed by: PHYSICIAN ASSISTANT

## 2021-07-14 PROCEDURE — 99214 OFFICE O/P EST MOD 30 MIN: CPT | Performed by: PHYSICIAN ASSISTANT

## 2021-07-14 NOTE — PROGRESS NOTES
Assessment/Plan:      Diagnoses and all orders for this visit:    Epigastric pain    Acute hip pain, left    Encounter for screening mammogram for malignant neoplasm of breast  -     Mammo screening bilateral w 3d & cad; Future    Iron deficiency anemia, unspecified iron deficiency anemia type  -     Ambulatory referral to Hematology / Oncology; Future      patient is a 55-year-old female presenting today for follow-up to acute GI symptoms for which she was seen for telemedicine visit weeks ago  Patient today states that her symptoms have resolved with a 14 day course of PPI and Zofran as needed and has not had any symptoms until this morning after she ate rice with sausage for breakfast and noted in urgent bowel movements and some mild abdominal discomfort  She did not have any diarrhea and reports that the abdominal discomfort resolved after bowel movement and has not had any GI symptoms since  For now will continue to monitor but I asked that she try to identify any trigger foods  I did also give her PPI handout primarily to have a list of things that she should avoid in her diet  I did notice today when patient got up from the chair to the examination table she was having some difficulty in her left lower back/left hip area  She did have some discomfort in her left hip in the lateral and anterior region with all range of motion but no instability  She also had some mild left lower back tenderness  At this time I have advised that she perform some gentle hip stretches and low back stretches at home as well as Tylenol as needed and moist heat compresses  I did advise she avoid NSAIDs secondary to GI symptoms  Patient advised to follow-up for more detailed examination and discussion of her symptoms should they not improve with home treatment  Patient also has significant history of iron deficiency anemia for which she was hospitalized back in September of last year    I did take notice that her gyn recently ordered blood work and it does appear that she is still fairly anemic with previous hemoglobin 7 8 in September, today 7 0  She did stop her iron supplements due to the abdominal pain that she was having last month and has not resumed them  She is agreeable to seeing Hematology to further evaluate her anemia and determine if she needs further iron infusions  Referral provided and she agrees to make the appointment on her own  For now we will plan to see patient back in about 4 months and plan and annual physical, she will follow-up sooner if there are any further concerns addressed in today's visit  she declines covid vaccine today  Chief Complaint   Patient presents with    Abdominal Pain     f/u on gastro sxs- this morning her stomach was acting up and she has been running to the bathroom all morning  Subjective:     Patient ID: Riddhi Villalobos is a 55 y o  female     45y/o female here today for f/u to telemed visit for GI sxs last month  Was prescribed zofran prn nausea and omeprazole 20mg daily x 14 days  She states overall she is feeling well  States she ate rice and sausage this AM, States she had some stomach pain after and had to use the bathroom  However sxs not persistent and have since resolved  She has eaten sausage before and had no issues  She denies any abdominal pain or diarrhea  She has not needed any medication for GI sxs over the counter and again feels well  She completed PPI course  Aside from this AM, she denies any abd pain, diarrhea, N/V or heartburn/reflux  Review of Systems   Constitutional: Negative  Respiratory: Negative  Cardiovascular: Negative  Gastrointestinal:        As in HPI   Genitourinary: Negative  Musculoskeletal:        Left hip anterior and lateral pain today, questioned pt as she had some guarding while getting up from chair to get on exam table            The following portions of the patient's history were reviewed and updated as appropriate: allergies, current medications, past family history, past medical history, past social history, past surgical history and problem list       Objective:     Physical Exam  Vitals reviewed  Constitutional:       General: She is not in acute distress  Appearance: Normal appearance  She is not ill-appearing or toxic-appearing  HENT:      Head: Normocephalic and atraumatic  Cardiovascular:      Rate and Rhythm: Normal rate and regular rhythm  Pulses: Normal pulses  Heart sounds: Normal heart sounds  Pulmonary:      Effort: Pulmonary effort is normal       Breath sounds: Normal breath sounds  Abdominal:      General: Abdomen is flat  Bowel sounds are normal       Palpations: Abdomen is soft  Tenderness: There is no abdominal tenderness  Musculoskeletal:      Cervical back: Neck supple  Lumbar back: Tenderness (mild left lower lumbar) present  Negative right straight leg raise test and negative left straight leg raise test       Left hip: Decreased range of motion (discomfort noted with flexion, extension, abduction and adduction  no instability)  Right lower leg: No edema  Left lower leg: No edema  Lymphadenopathy:      Head:      Right side of head: No submandibular or tonsillar adenopathy  Left side of head: No submandibular or tonsillar adenopathy  Neurological:      Mental Status: She is alert and oriented to person, place, and time  Psychiatric:         Mood and Affect: Mood normal          Behavior: Behavior normal  Behavior is cooperative           Vitals:    07/14/21 1446   BP: 125/75   BP Location: Right arm   Patient Position: Sitting   Cuff Size: Standard   Pulse: 80   Temp: 98 °F (36 7 °C)   TempSrc: Temporal   SpO2: 99%   Weight: 75 8 kg (167 lb)   Height: 5' 8" (1 727 m)

## 2021-07-16 ENCOUNTER — TELEPHONE (OUTPATIENT)
Dept: HEMATOLOGY ONCOLOGY | Facility: CLINIC | Age: 46
End: 2021-07-16

## 2021-07-16 NOTE — TELEPHONE ENCOUNTER
New Patient Encounter    New Patient Intake Form   Patient Details:  Shree De Santiago  1975  [id number]    Background Information:  [address] starts by opening a telephone encounter and gathering the following information   Who is calling to schedule? If not self, relationship to patient? Patient   Referring Provider Susanne Weeks   What is the diagnosis? Iron deficiency anemia   Is this Cancer or Non-Cancer? Non Cancer   Is this diagnosis confirmed? Yes   When was the diagnosis? 7-1-2021   Is there a confirmed diagnosis from a biopsy/tissue reviewed by pathology? N/A   Were outside slides requested? N/A   Is patient aware of diagnosis? Yes   Is there a personal history and what kind? No   Is there a family history and what kind? No   Reason for visit? Have you had any imaging or labs done? If so: when, where? Labs 7-1-2021   Are records in Epic? Yes   If patient has a prior history of cancer were old records obtained? N/A   Was the patient told to bring a disk? N/A   Does the patient smoke or Vape? No   If yes, how many packs or cartridges per day? Scheduling Information:   Preferred [place]:  Tai    Are there any dates/time the patient cannot be seen? Miscellaneous:    After completing the above information, please route to Financial Counselor and the appropriate Nurse Navigator for review

## 2021-07-19 ENCOUNTER — TELEPHONE (OUTPATIENT)
Dept: OBGYN CLINIC | Facility: CLINIC | Age: 46
End: 2021-07-19

## 2021-07-19 NOTE — TELEPHONE ENCOUNTER
----- Message from Anam Duran MD sent at 3/29/9607  3:18 PM EDT -----  Regarding: Carol Christopher, Thank for taking care of this patient  The hgb level is really low and I expect she is having vaginal bleeding which is most likely related with some abnormal uterine bleeding  Hemo Onc consultation is good idea but may not be helpful as we want  I think we need to see her in our clinic and probably need something better than Micronor that she was using  I am here CC this message to [facility]  Can you reach out her and schedule an appointment for this patient? Thanks, [employer]    ----- Message -----  From: Aimee Bradford PA-C  Sent: 7/16/2021   8:09 AM EDT  To: Anam Duran MD    Hi Dr Cherelle Cortes, I am patient's PCP and I saw her in our office for follow-up of GI symptoms yesterday  I took notice that you ordered a CBC on her and her hemoglobin is still pretty low at 7 0  She does not seem to be symptomatic, however she did stop her oral iron a few weeks ago due to GI issue she was having  I am still concerned regardless of her oral iron intake since she had to be hospitalized last year so I did refer her to Hematology to further evaluate the anemia and determine if she needs infusions  I just wanted to let you know since you ordered this [facility]  Thanks for assisting with this! Have a great weekend!     Lakeisha Townsend PA-C

## 2021-07-19 NOTE — TELEPHONE ENCOUNTER
Per providers request, called patient to offer appointment, left message via voicemail asking patient to call office

## 2021-07-29 ENCOUNTER — OFFICE VISIT (OUTPATIENT)
Dept: OBGYN CLINIC | Facility: CLINIC | Age: 46
End: 2021-07-29

## 2021-07-29 VITALS
DIASTOLIC BLOOD PRESSURE: 79 MMHG | SYSTOLIC BLOOD PRESSURE: 134 MMHG | BODY MASS INDEX: 25.31 KG/M2 | HEART RATE: 83 BPM | WEIGHT: 167 LBS | HEIGHT: 68 IN

## 2021-07-29 DIAGNOSIS — N92.0 MENORRHAGIA WITH REGULAR CYCLE: Primary | ICD-10-CM

## 2021-07-29 PROCEDURE — 99212 OFFICE O/P EST SF 10 MIN: CPT | Performed by: OBSTETRICS & GYNECOLOGY

## 2021-07-29 RX ORDER — MEDROXYPROGESTERONE ACETATE 150 MG/ML
150 INJECTION, SUSPENSION INTRAMUSCULAR
Qty: 1 ML | Refills: 0 | Status: SHIPPED | OUTPATIENT
Start: 2021-07-29 | End: 2021-11-08 | Stop reason: ALTCHOICE

## 2021-07-29 NOTE — PROGRESS NOTES
Subjective:     Frieda Davidson is a 55 y o   female who presents for follow up for menorrhagia and iron deficiency anemia  She had an ultrasound on 2020 showing a  "10 2 x 5 2 x 6 3 cm" uterus that was somewhat lobular in appearance suggestive of adenomyosis with no distinct fibroids visualized  An endometrial biopsy showed menstrual type endometrium without hyperplasia or atypia  Patient states that she has regular monthly periods that last 7 days and are heavy with blood clots  She has had heavy periods for the last decade  She is symptomatic, feeling dizziness, lightheadedness, and fatigue during this time  She has a history of blood transfusion and venofer transfusion in 2020 in the setting of symptomatic anemia with a Hgb of 6 0 likely due to menorrhagia  She previously has tried oral contraceptives with the most recent being micronor which she has not taken since February  On micronor she reports minimal relief of symptoms with continued heavy periods with clots  She has a hx of a tubal ligation for contraception  She has been anemic in the 6-7 Hgb range dating back to   She takes oral iron  Objective:    Vitals: Blood pressure 134/79, pulse 83, height 5' 8" (1 727 m), weight 75 8 kg (167 lb)  Body mass index is 25 39 kg/m²  Assessment/Plan:      The results of the work-up for abnormal uterine bleeding were reviewed today  Treatment options were discussed, including expectant management, hormonal (both combination and progesterone-only), Mirena, Lysteda, endometrial ablation, and hysterectomy  The risks, benefits and side effects of each method were discussed  Due to patients symptomatic anemia thats likely due to menorrhagia from adenomyosis patient is considering hysterectomy  She is uninterested in OCP's or progesterone only pills or IUD   Due to possible failure of endometrial ablation and possibilityof recurrence of menses she would like more definitive treatment  She  would like time to discuss with her  about a hysterectomy and would like to come back to discuss further details  She agreed to take a one time dose of Depo-Provera to control her menses while awaiting her decision       Discussed w/ Dr Roslyn Gamino MD  7/29/2021  3:35 PM

## 2021-08-06 ENCOUNTER — CLINICAL SUPPORT (OUTPATIENT)
Dept: OBGYN CLINIC | Facility: CLINIC | Age: 46
End: 2021-08-06

## 2021-08-06 DIAGNOSIS — Z30.013 ENCOUNTER FOR INITIAL PRESCRIPTION OF INJECTABLE CONTRACEPTIVE: Primary | ICD-10-CM

## 2021-08-06 LAB — SL AMB POCT URINE HCG: NORMAL

## 2021-08-06 PROCEDURE — 81025 URINE PREGNANCY TEST: CPT

## 2021-08-06 RX ORDER — MEDROXYPROGESTERONE ACETATE 150 MG/ML
150 INJECTION, SUSPENSION INTRAMUSCULAR ONCE
Status: COMPLETED | OUTPATIENT
Start: 2021-08-06 | End: 2021-08-06

## 2021-08-06 RX ADMIN — MEDROXYPROGESTERONE ACETATE 150 MG: 150 INJECTION, SUSPENSION INTRAMUSCULAR at 08:40

## 2021-08-06 NOTE — PROGRESS NOTES
Depo-Provera      Patient provided box Yes  o 0 Refills remain  o Refills submitted no  Patient will only use for three months and then follow up with provider   Last  Annual Date / Birth control check : 7/29/21   Last Depo date: 8/6/21   Side effects: no   HCG: yes  o if applicable: negative   Given by: [employer]   Site: Right Deltoid    o Calcium supplement daily teaching  o Condoms for 2 weeks following first injection dose

## 2021-08-12 ENCOUNTER — CONSULT (OUTPATIENT)
Dept: HEMATOLOGY ONCOLOGY | Facility: CLINIC | Age: 46
End: 2021-08-12

## 2021-08-12 VITALS
TEMPERATURE: 97.4 F | WEIGHT: 164 LBS | HEIGHT: 68 IN | OXYGEN SATURATION: 100 % | SYSTOLIC BLOOD PRESSURE: 146 MMHG | RESPIRATION RATE: 16 BRPM | HEART RATE: 86 BPM | BODY MASS INDEX: 24.86 KG/M2 | DIASTOLIC BLOOD PRESSURE: 68 MMHG

## 2021-08-12 DIAGNOSIS — D50.9 IRON DEFICIENCY ANEMIA, UNSPECIFIED IRON DEFICIENCY ANEMIA TYPE: Primary | ICD-10-CM

## 2021-08-12 DIAGNOSIS — D64.9 SYMPTOMATIC ANEMIA: ICD-10-CM

## 2021-08-12 DIAGNOSIS — N92.0 MENORRHAGIA WITH REGULAR CYCLE: ICD-10-CM

## 2021-08-12 DIAGNOSIS — E53.8 VITAMIN B12 DEFICIENCY: ICD-10-CM

## 2021-08-12 DIAGNOSIS — N92.0 MENORRHAGIA WITH REGULAR CYCLE: Primary | ICD-10-CM

## 2021-08-12 DIAGNOSIS — D50.9 IRON DEFICIENCY ANEMIA, UNSPECIFIED IRON DEFICIENCY ANEMIA TYPE: ICD-10-CM

## 2021-08-12 RX ORDER — SODIUM CHLORIDE 9 MG/ML
20 INJECTION, SOLUTION INTRAVENOUS ONCE
Status: CANCELLED | OUTPATIENT
Start: 2021-08-25

## 2021-08-12 NOTE — PROGRESS NOTES
[facility] [address] [address] [phone]  Hematology Ambulatory Consult  Lynette Keller, 1975, [id number]  8/12/2021    Assessment/Plan:  1  Iron deficiency anemia, unspecified iron deficiency anemia type  2  Menorrhagia with regular cycle  3  Symptomatic anemia  4  Vitamin B12 deficiency   Patient is a 41-year-old female with a history of anemia since at least December 2019 with a hemoglobin ranging between 6 6 and 7 8  Most recent hemoglobin in July 1, 2021 was 7 0  She was referred to our office for further evaluation and need for Iron Infusions  Patient has a long history of menorrhagia and has needed iron infusions in the past   Patient was referred to her gynecologist in regards to her heavy periods, and recently received Depo-Provera shot last week  She has tried oral contraception in the past however this has been ineffective  She is anticipated to follow-up with her gynecologist and expected to undergo a hysterectomy at some point in the future  Her CBC overall is essentially stable, hemoglobin 7 0 MCV 62  Previous iron panel was done in September 2020, saturation 4% with a ferritin level of 3  She received Venofer  mg x 1 dose while she was in the hospital and received a blood transfusion at the same time  She was started on oral iron supplementation however discontinued this secondary to abdominal pain nausea that constipation and vomiting  She states overall her diet is inclusive of iron rich foods however recently she has had a decreased appetite, nausea vomiting and constipation  I suspect her iron deficiency and subsequent anemia is secondary to her menorrhagia  We will plan for Venofer IV infusions 300 mg x 3 doses I reviewed adverse reactions including headaches, chest heaviness, shortness of breath, insertion site reaction    Patient states she has had this in the past and tolerated without difficulty  I do not have an updated iron panel with this time therefore  I requested 1 now then repeat in 3 months with follow-up  Patient and her daughter verbalized understanding and are in agreement with the plan  - Ambulatory referral to Hematology / Oncology  - sodium chloride 0 9 % infusion  - iron sucrose (VENOFER) 300 mg in sodium chloride 0 9 % 250 mL IVPB  - CBC and differential; Standing  - Comprehensive metabolic panel; Standing  - Iron Panel (Includes Ferritin, Iron Sat%, Iron, and TIBC); Standing  - CBC and differential  - Comprehensive metabolic panel  - Iron Panel (Includes Ferritin, Iron Sat%, Iron, and TIBC)    I have spent 35 minutes with   Patient and daughter today in which greater than 50% of this time was spent in counseling/coordination of care regarding Diagnostic results, Risks and benefits of tx options, Patient and family education, Impressions and Indications and adverse reactions for Venofer including shortness of breath, chest heaviness, headache  Barrier(s) to care: None  The patient is  able to self care     -------------------------------------------------------------------------------------------------------    Chief Complaint   Patient presents with    Consult       Referring provider:  Fred Kern PA-C  [facility]  [address] [place] [address],  [address] [place] [address]    History of present illness: Patient is a 51-year-old female with a history of anemia since at least December 2019 with a hemoglobin ranging between 6 6 and 7 8  Most recent hemoglobin in July 1, 2021 was 7 0  She was referred to our office for further evaluation and need for    Iron Infusions  Review of Systems   Constitutional: Positive for fatigue  Negative for activity change, appetite change (Decreased appetite), fever and unexpected weight change  Respiratory: Negative for cough and shortness of breath  Cardiovascular: Negative for chest pain and leg swelling  Gastrointestinal: Positive for nausea  Negative for abdominal pain, constipation and diarrhea  Endocrine: Negative for cold intolerance and heat intolerance  Genitourinary: Positive for menstrual problem (Menorrhagia)  Musculoskeletal: Negative for arthralgias and myalgias  Skin: Negative  Neurological: Negative for dizziness, weakness and headaches  Hematological: Negative for adenopathy  Does not bruise/bleed easily         Patient Active Problem List   Diagnosis    Iron deficiency anemia    Menorrhagia with regular cycle    Epigastric pain    Mid back pain    Nausea    Symptomatic anemia    Hypokalemia    Schatzki's ring       Past Medical History:   Diagnosis Date    Anemia        Past Surgical History:   Procedure Laterality Date    TUBAL LIGATION         Family History   Problem Relation Age of Onset    Hypertension Mother     Stroke Mother     No Known Problems Father     Hypertension Maternal Grandmother     Stroke Maternal Grandmother     Hypertension Maternal Uncle     Stroke Maternal Uncle     Stroke Paternal Aunt     No Known Problems Sister     HIV Brother     No Known Problems Daughter     No Known Problems Son     No Known Problems Maternal Grandfather     No Known Problems Paternal Grandmother     No Known Problems Paternal Grandfather     No Known Problems Sister     No Known Problems Daughter        Social History     Socioeconomic History    Marital status: Single     Spouse name: None    Number of children: None    Years of education: None    Highest education level: None   Occupational History    None   Tobacco Use    Smoking status: Never Smoker    Smokeless tobacco: Never Used   Vaping Use    Vaping Use: Never used   Substance and Sexual Activity    Alcohol use: Never    Drug use: Never    Sexual activity: Yes     Partners: Male     Birth control/protection: Female Sterilization   Other Topics Concern    None   Social History Narrative    None     Social Determinants of Health     Financial Resource Strain: High Risk    Difficulty of Paying Living Expenses: Hard   Food Insecurity: Food Insecurity Present    Worried About Running Out of Food in the Last Year: Sometimes true    Suzanne of Food in the Last Year: Sometimes true   Transportation Needs: No Transportation Needs    Lack of Transportation (Medical): No    Lack of Transportation (Non-Medical):  No   Physical Activity: Inactive    Days of Exercise per Week: 0 days    Minutes of Exercise per Session: 0 min   Stress: No Stress Concern Present    Feeling of Stress : Not at all   Social Connections: Socially Isolated    Frequency of Communication with Friends and Family: More than three times a week    Frequency of Social Gatherings with Friends and Family: More than three times a week    Attends Restoration Services: Never    Active Member of Clubs or Organizations: No    Attends Club or Organization Meetings: Never    Marital Status: Never    Intimate Partner Violence: Not At Risk    Fear of Current or Ex-Partner: No    Emotionally Abused: No    Physically Abused: No    Sexually Abused: No         Current Outpatient Medications:     medroxyPROGESTERone (DEPO-PROVERA) 150 mg/mL injection, Inject 1 mL (150 mg total) into a muscle every 3 (three) months for 1 day, Disp: 1 mL, Rfl: 0    ferrous sulfate 324 (65 Fe) mg, Take 1 tablet (324 mg total) by mouth 2 (two) times a day before meals (Patient not taking: Reported on 7/29/2021), Disp: 90 tablet, Rfl: 3    norethindrone (MICRONOR) 0 35 MG tablet, Take 1 tablet (0 35 mg total) by mouth daily (Patient not taking: Reported on 7/14/2021), Disp: 28 tablet, Rfl: 4    No Known Allergies    Objective:  /68 (BP Location: Left arm, Patient Position: Sitting, Cuff Size: Standard)   Pulse 86   Temp (!) 97 4 °F (36 3 °C) (Temporal)   Resp 16   Ht 5' 8" (1 727 m)   Wt 74 4 kg (164 lb)   SpO2 100%   BMI 24 94 kg/m²   Physical Exam  Vitals reviewed  Constitutional:       Appearance: Normal appearance  She is well-developed  HENT:      Head: Normocephalic and atraumatic  Eyes:      Conjunctiva/sclera: Conjunctivae normal       Pupils: Pupils are equal, round, and reactive to light  Cardiovascular:      Rate and Rhythm: Normal rate and regular rhythm  Pulses: Normal pulses  Heart sounds: Normal heart sounds  No murmur heard  Pulmonary:      Effort: Pulmonary effort is normal  No respiratory distress  Breath sounds: Normal breath sounds  Abdominal:      General: Bowel sounds are normal       Palpations: Abdomen is soft  Musculoskeletal:         General: Normal range of motion  Cervical back: Normal range of motion and neck supple  Lymphadenopathy:      Cervical: No cervical adenopathy  Skin:     General: Skin is warm and dry  Capillary Refill: Capillary refill takes less than 2 seconds  Neurological:      Mental Status: She is alert and oriented to person, place, and time     Psychiatric:         Behavior: Behavior normal          Result Review  Labs:  Clinical Support on 08/06/2021   Component Date Value Ref Range Status    URINE HCG 08/06/2021 neg   Final   Appointment on 07/01/2021   Component Date Value Ref Range Status    WBC 07/01/2021 4 64  4 31 - 10 16 Thousand/uL Final    RBC 07/01/2021 4 28  3 81 - 5 12 Million/uL Final    Hemoglobin 07/01/2021 7 0* 11 5 - 15 4 g/dL Final    Hematocrit 07/01/2021 26 7* 34 8 - 46 1 % Final    MCV 07/01/2021 62* 82 - 98 fL Final    MCH 07/01/2021 16 4* 26 8 - 34 3 pg Final    MCHC 07/01/2021 26 2* 31 4 - 37 4 g/dL Final    RDW 07/01/2021 20 0* 11 6 - 15 1 % Final    MPV 07/01/2021 9 4  8 9 - 12 7 fL Final    Platelets 62/34/8330 303  149 - 390 Thousands/uL Final    nRBC 07/01/2021 0  /100 WBCs Final    Neutrophils Relative 07/01/2021 53  43 - 75 % Final    Immat GRANS % 07/01/2021 0  0 - 2 % Final    Lymphocytes Relative 07/01/2021 31  14 - 44 % Final    Monocytes Relative 07/01/2021 12  4 - 12 % Final    Eosinophils Relative 07/01/2021 3  0 - 6 % Final    Basophils Relative 07/01/2021 1  0 - 1 % Final    Neutrophils Absolute 07/01/2021 2 45  1 85 - 7 62 Thousands/µL Final    Immature Grans Absolute 07/01/2021 0 01  0 00 - 0 20 Thousand/uL Final    Lymphocytes Absolute 07/01/2021 1 43  0 60 - 4 47 Thousands/µL Final    Monocytes Absolute 07/01/2021 0 55  0 17 - 1 22 Thousand/µL Final    Eosinophils Absolute 07/01/2021 0 15  0 00 - 0 61 Thousand/µL Final    Basophils Absolute 07/01/2021 0 05  0 00 - 0 10 Thousands/µL Final       Please note: This report has been generated by a voice recognition software system  Therefore there may be syntax, spelling, and/or grammatical errors  Please call if you have any questions

## 2021-08-16 ENCOUNTER — APPOINTMENT (OUTPATIENT)
Dept: LAB | Facility: HOSPITAL | Age: 46
End: 2021-08-16
Payer: COMMERCIAL

## 2021-08-16 ENCOUNTER — TELEPHONE (OUTPATIENT)
Dept: HEMATOLOGY ONCOLOGY | Facility: CLINIC | Age: 46
End: 2021-08-16

## 2021-08-16 DIAGNOSIS — D64.9 SYMPTOMATIC ANEMIA: ICD-10-CM

## 2021-08-16 DIAGNOSIS — E53.8 VITAMIN B12 DEFICIENCY: ICD-10-CM

## 2021-08-16 DIAGNOSIS — D50.9 IRON DEFICIENCY ANEMIA, UNSPECIFIED IRON DEFICIENCY ANEMIA TYPE: ICD-10-CM

## 2021-08-16 DIAGNOSIS — D64.9 SYMPTOMATIC ANEMIA: Primary | ICD-10-CM

## 2021-08-16 DIAGNOSIS — N92.0 MENORRHAGIA WITH REGULAR CYCLE: ICD-10-CM

## 2021-08-16 LAB
ABO GROUP BLD: NORMAL
ALBUMIN SERPL BCP-MCNC: 3.5 G/DL (ref 3.5–5)
ALP SERPL-CCNC: 63 U/L (ref 46–116)
ALT SERPL W P-5'-P-CCNC: 16 U/L (ref 12–78)
ANION GAP SERPL CALCULATED.3IONS-SCNC: 1 MMOL/L (ref 4–13)
AST SERPL W P-5'-P-CCNC: 9 U/L (ref 5–45)
BASOPHILS # BLD AUTO: 0.04 THOUSANDS/ΜL (ref 0–0.1)
BASOPHILS NFR BLD AUTO: 1 % (ref 0–1)
BILIRUB SERPL-MCNC: 0.52 MG/DL (ref 0.2–1)
BLD GP AB SCN SERPL QL: NEGATIVE
BUN SERPL-MCNC: 9 MG/DL (ref 5–25)
CALCIUM SERPL-MCNC: 8.6 MG/DL (ref 8.3–10.1)
CHLORIDE SERPL-SCNC: 109 MMOL/L (ref 100–108)
CO2 SERPL-SCNC: 27 MMOL/L (ref 21–32)
CREAT SERPL-MCNC: 0.43 MG/DL (ref 0.6–1.3)
EOSINOPHIL # BLD AUTO: 0.11 THOUSAND/ΜL (ref 0–0.61)
EOSINOPHIL NFR BLD AUTO: 3 % (ref 0–6)
ERYTHROCYTE [DISTWIDTH] IN BLOOD BY AUTOMATED COUNT: 21.9 % (ref 11.6–15.1)
FERRITIN SERPL-MCNC: 2 NG/ML (ref 8–388)
GFR SERPL CREATININE-BSD FRML MDRD: 122 ML/MIN/1.73SQ M
GLUCOSE P FAST SERPL-MCNC: 85 MG/DL (ref 65–99)
HCT VFR BLD AUTO: 24.8 % (ref 34.8–46.1)
HGB BLD-MCNC: 6.3 G/DL (ref 11.5–15.4)
IMM GRANULOCYTES # BLD AUTO: 0.01 THOUSAND/UL (ref 0–0.2)
IMM GRANULOCYTES NFR BLD AUTO: 0 % (ref 0–2)
IRON SATN MFR SERPL: 3 %
IRON SERPL-MCNC: 10 UG/DL (ref 50–170)
LYMPHOCYTES # BLD AUTO: 1 THOUSANDS/ΜL (ref 0.6–4.47)
LYMPHOCYTES NFR BLD AUTO: 23 % (ref 14–44)
MCH RBC QN AUTO: 15.9 PG (ref 26.8–34.3)
MCHC RBC AUTO-ENTMCNC: 25.4 G/DL (ref 31.4–37.4)
MCV RBC AUTO: 63 FL (ref 82–98)
MONOCYTES # BLD AUTO: 0.48 THOUSAND/ΜL (ref 0.17–1.22)
MONOCYTES NFR BLD AUTO: 11 % (ref 4–12)
NEUTROPHILS # BLD AUTO: 2.74 THOUSANDS/ΜL (ref 1.85–7.62)
NEUTS SEG NFR BLD AUTO: 62 % (ref 43–75)
NRBC BLD AUTO-RTO: 0 /100 WBCS
PLATELET # BLD AUTO: 480 THOUSANDS/UL (ref 149–390)
PMV BLD AUTO: 9.2 FL (ref 8.9–12.7)
POTASSIUM SERPL-SCNC: 3.7 MMOL/L (ref 3.5–5.3)
PROT SERPL-MCNC: 7.2 G/DL (ref 6.4–8.2)
RBC # BLD AUTO: 3.96 MILLION/UL (ref 3.81–5.12)
RH BLD: POSITIVE
SODIUM SERPL-SCNC: 137 MMOL/L (ref 136–145)
SPECIMEN EXPIRATION DATE: NORMAL
TIBC SERPL-MCNC: 388 UG/DL (ref 250–450)
VIT B12 SERPL-MCNC: 358 PG/ML (ref 100–900)
WBC # BLD AUTO: 4.38 THOUSAND/UL (ref 4.31–10.16)

## 2021-08-16 PROCEDURE — 83540 ASSAY OF IRON: CPT | Performed by: NURSE PRACTITIONER

## 2021-08-16 PROCEDURE — 86901 BLOOD TYPING SEROLOGIC RH(D): CPT

## 2021-08-16 PROCEDURE — 83550 IRON BINDING TEST: CPT | Performed by: NURSE PRACTITIONER

## 2021-08-16 PROCEDURE — 85025 COMPLETE CBC W/AUTO DIFF WBC: CPT | Performed by: NURSE PRACTITIONER

## 2021-08-16 PROCEDURE — 86900 BLOOD TYPING SEROLOGIC ABO: CPT

## 2021-08-16 PROCEDURE — 82607 VITAMIN B-12: CPT | Performed by: NURSE PRACTITIONER

## 2021-08-16 PROCEDURE — 80053 COMPREHEN METABOLIC PANEL: CPT | Performed by: NURSE PRACTITIONER

## 2021-08-16 PROCEDURE — 36415 COLL VENOUS BLD VENIPUNCTURE: CPT | Performed by: NURSE PRACTITIONER

## 2021-08-16 PROCEDURE — 86850 RBC ANTIBODY SCREEN: CPT

## 2021-08-16 PROCEDURE — 82728 ASSAY OF FERRITIN: CPT | Performed by: NURSE PRACTITIONER

## 2021-08-16 RX ORDER — SODIUM CHLORIDE 9 MG/ML
20 INJECTION, SOLUTION INTRAVENOUS ONCE
Status: CANCELLED | OUTPATIENT
Start: 2021-08-17

## 2021-08-16 NOTE — TELEPHONE ENCOUNTER
No transfusion parameters in [facility] for transfusion    Will forward to [facility] Rai/Dr Paz's RN for review with MD

## 2021-08-16 NOTE — TELEPHONE ENCOUNTER
Pt set up for 1unit of blood tomorrow at [facility] infusion @1030  Ramya Adame MA called pt and informed her of this and to get type and screen done today  Pt verbalized understanding

## 2021-08-16 NOTE — TELEPHONE ENCOUNTER
Critical Results   Call Received From [facility] Dipak Fox   Lab Department Location  [place]    Lab Study Hemaglobin 6 3   Date Blood Work was Done 8-   Read Back of Information Done Yes   Relevant Information

## 2021-08-17 ENCOUNTER — HOSPITAL ENCOUNTER (OUTPATIENT)
Dept: INFUSION CENTER | Facility: HOSPITAL | Age: 46
Discharge: HOME | End: 2021-08-17
Payer: COMMERCIAL

## 2021-08-17 VITALS
DIASTOLIC BLOOD PRESSURE: 78 MMHG | HEART RATE: 70 BPM | RESPIRATION RATE: 18 BRPM | SYSTOLIC BLOOD PRESSURE: 140 MMHG | TEMPERATURE: 98 F

## 2021-08-17 DIAGNOSIS — D64.9 SYMPTOMATIC ANEMIA: Primary | ICD-10-CM

## 2021-08-17 PROCEDURE — 36430 TRANSFUSION BLD/BLD COMPNT: CPT

## 2021-08-17 PROCEDURE — 86923 COMPATIBILITY TEST ELECTRIC: CPT

## 2021-08-17 PROCEDURE — P9016 RBC LEUKOCYTES REDUCED: HCPCS

## 2021-08-17 RX ORDER — SODIUM CHLORIDE 9 MG/ML
20 INJECTION, SOLUTION INTRAVENOUS ONCE
Status: CANCELLED | OUTPATIENT
Start: 2021-08-17

## 2021-08-17 RX ORDER — SODIUM CHLORIDE 9 MG/ML
20 INJECTION, SOLUTION INTRAVENOUS ONCE
Status: COMPLETED | OUTPATIENT
Start: 2021-08-17 | End: 2021-08-17

## 2021-08-17 RX ADMIN — SODIUM CHLORIDE 20 ML/HR: 0.9 INJECTION, SOLUTION INTRAVENOUS at 13:10

## 2021-08-17 NOTE — PROGRESS NOTES
Pt  Received 1 unit PRBC's today without incident  Confirmed next appt  AVS printed and given to pt

## 2021-08-18 LAB
ABO GROUP BLD BPU: NORMAL
BPU ID: NORMAL
CROSSMATCH: NORMAL
UNIT DISPENSE STATUS: NORMAL
UNIT PRODUCT CODE: NORMAL
UNIT PRODUCT VOLUME: 350 ML
UNIT RH: NORMAL

## 2021-08-25 ENCOUNTER — HOSPITAL ENCOUNTER (OUTPATIENT)
Dept: INFUSION CENTER | Facility: HOSPITAL | Age: 46
Discharge: HOME | End: 2021-08-25
Attending: INTERNAL MEDICINE
Payer: COMMERCIAL

## 2021-08-25 VITALS
SYSTOLIC BLOOD PRESSURE: 126 MMHG | HEART RATE: 86 BPM | TEMPERATURE: 97 F | DIASTOLIC BLOOD PRESSURE: 61 MMHG | RESPIRATION RATE: 18 BRPM

## 2021-08-25 DIAGNOSIS — N92.0 MENORRHAGIA WITH REGULAR CYCLE: Primary | ICD-10-CM

## 2021-08-25 DIAGNOSIS — D64.9 SYMPTOMATIC ANEMIA: ICD-10-CM

## 2021-08-25 DIAGNOSIS — D50.9 IRON DEFICIENCY ANEMIA, UNSPECIFIED IRON DEFICIENCY ANEMIA TYPE: ICD-10-CM

## 2021-08-25 PROCEDURE — 96365 THER/PROPH/DIAG IV INF INIT: CPT

## 2021-08-25 PROCEDURE — 96366 THER/PROPH/DIAG IV INF ADDON: CPT

## 2021-08-25 RX ORDER — SODIUM CHLORIDE 9 MG/ML
20 INJECTION, SOLUTION INTRAVENOUS ONCE
Status: CANCELLED | OUTPATIENT
Start: 2021-09-01

## 2021-08-25 RX ORDER — SODIUM CHLORIDE 9 MG/ML
20 INJECTION, SOLUTION INTRAVENOUS ONCE
Status: COMPLETED | OUTPATIENT
Start: 2021-08-25 | End: 2021-08-25

## 2021-08-25 RX ADMIN — IRON SUCROSE 300 MG: 20 INJECTION, SOLUTION INTRAVENOUS at 13:15

## 2021-08-25 RX ADMIN — SODIUM CHLORIDE 20 ML/HR: 0.9 INJECTION, SOLUTION INTRAVENOUS at 13:18

## 2021-09-01 ENCOUNTER — HOSPITAL ENCOUNTER (OUTPATIENT)
Dept: INFUSION CENTER | Facility: HOSPITAL | Age: 46
Discharge: HOME | End: 2021-09-01
Attending: INTERNAL MEDICINE
Payer: COMMERCIAL

## 2021-09-01 VITALS
TEMPERATURE: 97.3 F | SYSTOLIC BLOOD PRESSURE: 131 MMHG | DIASTOLIC BLOOD PRESSURE: 81 MMHG | HEART RATE: 77 BPM | RESPIRATION RATE: 18 BRPM

## 2021-09-01 DIAGNOSIS — N92.0 MENORRHAGIA WITH REGULAR CYCLE: Primary | ICD-10-CM

## 2021-09-01 DIAGNOSIS — D64.9 SYMPTOMATIC ANEMIA: ICD-10-CM

## 2021-09-01 DIAGNOSIS — D50.9 IRON DEFICIENCY ANEMIA, UNSPECIFIED IRON DEFICIENCY ANEMIA TYPE: ICD-10-CM

## 2021-09-01 PROCEDURE — 96366 THER/PROPH/DIAG IV INF ADDON: CPT

## 2021-09-01 PROCEDURE — 96365 THER/PROPH/DIAG IV INF INIT: CPT

## 2021-09-01 RX ORDER — SODIUM CHLORIDE 9 MG/ML
20 INJECTION, SOLUTION INTRAVENOUS ONCE
Status: COMPLETED | OUTPATIENT
Start: 2021-09-01 | End: 2021-09-01

## 2021-09-01 RX ORDER — SODIUM CHLORIDE 9 MG/ML
20 INJECTION, SOLUTION INTRAVENOUS ONCE
Status: CANCELLED | OUTPATIENT
Start: 2021-09-09

## 2021-09-01 RX ADMIN — SODIUM CHLORIDE 20 ML/HR: 0.9 INJECTION, SOLUTION INTRAVENOUS at 13:38

## 2021-09-01 RX ADMIN — IRON SUCROSE 300 MG: 20 INJECTION, SOLUTION INTRAVENOUS at 13:39

## 2021-09-09 ENCOUNTER — HOSPITAL ENCOUNTER (OUTPATIENT)
Dept: INFUSION CENTER | Facility: HOSPITAL | Age: 46
Discharge: HOME | End: 2021-09-09
Attending: INTERNAL MEDICINE
Payer: COMMERCIAL

## 2021-09-09 VITALS
SYSTOLIC BLOOD PRESSURE: 112 MMHG | DIASTOLIC BLOOD PRESSURE: 76 MMHG | RESPIRATION RATE: 18 BRPM | TEMPERATURE: 97.9 F | HEART RATE: 76 BPM

## 2021-09-09 DIAGNOSIS — D64.9 SYMPTOMATIC ANEMIA: ICD-10-CM

## 2021-09-09 DIAGNOSIS — N92.0 MENORRHAGIA WITH REGULAR CYCLE: Primary | ICD-10-CM

## 2021-09-09 DIAGNOSIS — D50.9 IRON DEFICIENCY ANEMIA, UNSPECIFIED IRON DEFICIENCY ANEMIA TYPE: ICD-10-CM

## 2021-09-09 PROCEDURE — 96366 THER/PROPH/DIAG IV INF ADDON: CPT

## 2021-09-09 PROCEDURE — 96365 THER/PROPH/DIAG IV INF INIT: CPT

## 2021-09-09 RX ORDER — SODIUM CHLORIDE 9 MG/ML
20 INJECTION, SOLUTION INTRAVENOUS ONCE
Status: CANCELLED | OUTPATIENT
Start: 2021-09-15

## 2021-09-09 RX ORDER — SODIUM CHLORIDE 9 MG/ML
20 INJECTION, SOLUTION INTRAVENOUS ONCE
Status: COMPLETED | OUTPATIENT
Start: 2021-09-09 | End: 2021-09-09

## 2021-09-09 RX ADMIN — IRON SUCROSE 300 MG: 20 INJECTION, SOLUTION INTRAVENOUS at 14:13

## 2021-09-09 RX ADMIN — SODIUM CHLORIDE 20 ML/HR: 0.9 INJECTION, SOLUTION INTRAVENOUS at 14:13

## 2021-11-02 ENCOUNTER — APPOINTMENT (OUTPATIENT)
Dept: LAB | Facility: HOSPITAL | Age: 46
End: 2021-11-02
Payer: COMMERCIAL

## 2021-11-02 LAB
ALBUMIN SERPL BCP-MCNC: 3.6 G/DL (ref 3.5–5)
ALP SERPL-CCNC: 72 U/L (ref 46–116)
ALT SERPL W P-5'-P-CCNC: 14 U/L (ref 12–78)
ANION GAP SERPL CALCULATED.3IONS-SCNC: 4 MMOL/L (ref 4–13)
AST SERPL W P-5'-P-CCNC: 12 U/L (ref 5–45)
BASOPHILS # BLD AUTO: 0.03 THOUSANDS/ΜL (ref 0–0.1)
BASOPHILS NFR BLD AUTO: 1 % (ref 0–1)
BILIRUB SERPL-MCNC: 0.48 MG/DL (ref 0.2–1)
BUN SERPL-MCNC: 14 MG/DL (ref 5–25)
CALCIUM SERPL-MCNC: 9.2 MG/DL (ref 8.3–10.1)
CHLORIDE SERPL-SCNC: 109 MMOL/L (ref 100–108)
CO2 SERPL-SCNC: 24 MMOL/L (ref 21–32)
CREAT SERPL-MCNC: 0.56 MG/DL (ref 0.6–1.3)
EOSINOPHIL # BLD AUTO: 0.22 THOUSAND/ΜL (ref 0–0.61)
EOSINOPHIL NFR BLD AUTO: 4 % (ref 0–6)
ERYTHROCYTE [DISTWIDTH] IN BLOOD BY AUTOMATED COUNT: 19 % (ref 11.6–15.1)
FERRITIN SERPL-MCNC: 5 NG/ML (ref 8–388)
GFR SERPL CREATININE-BSD FRML MDRD: 112 ML/MIN/1.73SQ M
GLUCOSE P FAST SERPL-MCNC: 81 MG/DL (ref 65–99)
HCT VFR BLD AUTO: 38 % (ref 34.8–46.1)
HGB BLD-MCNC: 11.7 G/DL (ref 11.5–15.4)
IMM GRANULOCYTES # BLD AUTO: 0.01 THOUSAND/UL (ref 0–0.2)
IMM GRANULOCYTES NFR BLD AUTO: 0 % (ref 0–2)
IRON SATN MFR SERPL: 7 % (ref 15–50)
IRON SERPL-MCNC: 32 UG/DL (ref 50–170)
LYMPHOCYTES # BLD AUTO: 1.5 THOUSANDS/ΜL (ref 0.6–4.47)
LYMPHOCYTES NFR BLD AUTO: 26 % (ref 14–44)
MCH RBC QN AUTO: 24.2 PG (ref 26.8–34.3)
MCHC RBC AUTO-ENTMCNC: 30.8 G/DL (ref 31.4–37.4)
MCV RBC AUTO: 79 FL (ref 82–98)
MONOCYTES # BLD AUTO: 0.48 THOUSAND/ΜL (ref 0.17–1.22)
MONOCYTES NFR BLD AUTO: 9 % (ref 4–12)
NEUTROPHILS # BLD AUTO: 3.44 THOUSANDS/ΜL (ref 1.85–7.62)
NEUTS SEG NFR BLD AUTO: 60 % (ref 43–75)
NRBC BLD AUTO-RTO: 0 /100 WBCS
PLATELET # BLD AUTO: 308 THOUSANDS/UL (ref 149–390)
PMV BLD AUTO: 9.6 FL (ref 8.9–12.7)
POTASSIUM SERPL-SCNC: 3.7 MMOL/L (ref 3.5–5.3)
PROT SERPL-MCNC: 7.9 G/DL (ref 6.4–8.2)
RBC # BLD AUTO: 4.83 MILLION/UL (ref 3.81–5.12)
SODIUM SERPL-SCNC: 137 MMOL/L (ref 136–145)
TIBC SERPL-MCNC: 449 UG/DL (ref 250–450)
VIT B12 SERPL-MCNC: 371 PG/ML (ref 100–900)
WBC # BLD AUTO: 5.68 THOUSAND/UL (ref 4.31–10.16)

## 2021-11-02 PROCEDURE — 82728 ASSAY OF FERRITIN: CPT

## 2021-11-02 PROCEDURE — 80053 COMPREHEN METABOLIC PANEL: CPT

## 2021-11-02 PROCEDURE — 82607 VITAMIN B-12: CPT

## 2021-11-02 PROCEDURE — 36415 COLL VENOUS BLD VENIPUNCTURE: CPT

## 2021-11-02 PROCEDURE — 83540 ASSAY OF IRON: CPT

## 2021-11-02 PROCEDURE — 83550 IRON BINDING TEST: CPT

## 2021-11-02 PROCEDURE — 85025 COMPLETE CBC W/AUTO DIFF WBC: CPT

## 2021-11-03 ENCOUNTER — OFFICE VISIT (OUTPATIENT)
Dept: OBGYN CLINIC | Facility: CLINIC | Age: 46
End: 2021-11-03

## 2021-11-03 VITALS
SYSTOLIC BLOOD PRESSURE: 139 MMHG | HEIGHT: 68 IN | WEIGHT: 167 LBS | BODY MASS INDEX: 25.31 KG/M2 | DIASTOLIC BLOOD PRESSURE: 78 MMHG | HEART RATE: 75 BPM

## 2021-11-03 DIAGNOSIS — N93.9 ABNORMAL UTERINE BLEEDING (AUB): Primary | ICD-10-CM

## 2021-11-03 PROCEDURE — 99214 OFFICE O/P EST MOD 30 MIN: CPT | Performed by: OBSTETRICS & GYNECOLOGY

## 2021-11-03 RX ORDER — TRANEXAMIC ACID 650 1/1
650 TABLET ORAL 2 TIMES DAILY
Qty: 10 TABLET | Refills: 0 | Status: SHIPPED | OUTPATIENT
Start: 2021-11-03 | End: 2021-11-08 | Stop reason: RX

## 2021-11-08 ENCOUNTER — OFFICE VISIT (OUTPATIENT)
Dept: HEMATOLOGY ONCOLOGY | Facility: CLINIC | Age: 46
End: 2021-11-08
Payer: COMMERCIAL

## 2021-11-08 VITALS
HEART RATE: 68 BPM | OXYGEN SATURATION: 100 % | DIASTOLIC BLOOD PRESSURE: 80 MMHG | RESPIRATION RATE: 16 BRPM | SYSTOLIC BLOOD PRESSURE: 126 MMHG | WEIGHT: 167 LBS | HEIGHT: 68 IN | BODY MASS INDEX: 25.31 KG/M2 | TEMPERATURE: 98.1 F

## 2021-11-08 DIAGNOSIS — D50.9 IRON DEFICIENCY ANEMIA, UNSPECIFIED IRON DEFICIENCY ANEMIA TYPE: ICD-10-CM

## 2021-11-08 DIAGNOSIS — D50.9 IRON DEFICIENCY ANEMIA, UNSPECIFIED IRON DEFICIENCY ANEMIA TYPE: Primary | ICD-10-CM

## 2021-11-08 DIAGNOSIS — N92.0 MENORRHAGIA WITH REGULAR CYCLE: ICD-10-CM

## 2021-11-08 DIAGNOSIS — N92.0 MENORRHAGIA WITH REGULAR CYCLE: Primary | ICD-10-CM

## 2021-11-08 PROCEDURE — 99213 OFFICE O/P EST LOW 20 MIN: CPT | Performed by: NURSE PRACTITIONER

## 2021-11-08 RX ORDER — SODIUM CHLORIDE 9 MG/ML
20 INJECTION, SOLUTION INTRAVENOUS ONCE
Status: CANCELLED | OUTPATIENT
Start: 2021-11-19

## 2021-11-16 ENCOUNTER — OFFICE VISIT (OUTPATIENT)
Dept: INTERNAL MEDICINE CLINIC | Facility: CLINIC | Age: 46
End: 2021-11-16

## 2021-11-16 VITALS
DIASTOLIC BLOOD PRESSURE: 78 MMHG | WEIGHT: 170 LBS | HEIGHT: 68 IN | SYSTOLIC BLOOD PRESSURE: 121 MMHG | BODY MASS INDEX: 25.76 KG/M2 | HEART RATE: 73 BPM | TEMPERATURE: 98 F

## 2021-11-16 DIAGNOSIS — Z13.220 SCREENING, LIPID: ICD-10-CM

## 2021-11-16 DIAGNOSIS — E66.3 OVERWEIGHT WITH BODY MASS INDEX (BMI) OF 25 TO 25.9 IN ADULT: ICD-10-CM

## 2021-11-16 DIAGNOSIS — M54.9 MID BACK PAIN: ICD-10-CM

## 2021-11-16 DIAGNOSIS — Z00.00 ROUTINE ADULT HEALTH MAINTENANCE: Primary | ICD-10-CM

## 2021-11-16 DIAGNOSIS — Z13.1 SCREENING FOR DIABETES MELLITUS: ICD-10-CM

## 2021-11-16 DIAGNOSIS — Z11.59 NEED FOR HEPATITIS C SCREENING TEST: ICD-10-CM

## 2021-11-16 DIAGNOSIS — Z01.20 ENCOUNTER FOR ROUTINE DENTAL EXAMINATION: ICD-10-CM

## 2021-11-16 PROCEDURE — 99396 PREV VISIT EST AGE 40-64: CPT | Performed by: PHYSICIAN ASSISTANT

## 2021-11-17 PROBLEM — E66.3 OVERWEIGHT WITH BODY MASS INDEX (BMI) OF 25 TO 25.9 IN ADULT: Status: ACTIVE | Noted: 2021-11-17

## 2021-11-19 ENCOUNTER — HOSPITAL ENCOUNTER (OUTPATIENT)
Dept: INFUSION CENTER | Facility: HOSPITAL | Age: 46
Discharge: HOME | End: 2021-11-19
Attending: INTERNAL MEDICINE
Payer: COMMERCIAL

## 2021-11-19 VITALS
DIASTOLIC BLOOD PRESSURE: 82 MMHG | RESPIRATION RATE: 16 BRPM | SYSTOLIC BLOOD PRESSURE: 143 MMHG | HEART RATE: 79 BPM | TEMPERATURE: 97.7 F

## 2021-11-19 DIAGNOSIS — N92.0 MENORRHAGIA WITH REGULAR CYCLE: Primary | ICD-10-CM

## 2021-11-19 DIAGNOSIS — D50.9 IRON DEFICIENCY ANEMIA, UNSPECIFIED IRON DEFICIENCY ANEMIA TYPE: ICD-10-CM

## 2021-11-19 PROCEDURE — 96365 THER/PROPH/DIAG IV INF INIT: CPT

## 2021-11-19 PROCEDURE — 96366 THER/PROPH/DIAG IV INF ADDON: CPT

## 2021-11-19 RX ORDER — SODIUM CHLORIDE 9 MG/ML
20 INJECTION, SOLUTION INTRAVENOUS ONCE
Status: CANCELLED | OUTPATIENT
Start: 2021-11-24

## 2021-11-19 RX ORDER — SODIUM CHLORIDE 9 MG/ML
20 INJECTION, SOLUTION INTRAVENOUS ONCE
Status: COMPLETED | OUTPATIENT
Start: 2021-11-19 | End: 2021-11-19

## 2021-11-19 RX ADMIN — IRON SUCROSE 300 MG: 20 INJECTION, SOLUTION INTRAVENOUS at 14:39

## 2021-11-19 RX ADMIN — SODIUM CHLORIDE 20 ML/HR: 0.9 INJECTION, SOLUTION INTRAVENOUS at 14:39

## 2021-11-24 ENCOUNTER — HOSPITAL ENCOUNTER (OUTPATIENT)
Dept: INFUSION CENTER | Facility: HOSPITAL | Age: 46
Discharge: HOME | End: 2021-11-24
Attending: INTERNAL MEDICINE
Payer: COMMERCIAL

## 2021-11-24 VITALS — DIASTOLIC BLOOD PRESSURE: 75 MMHG | HEART RATE: 69 BPM | SYSTOLIC BLOOD PRESSURE: 131 MMHG

## 2021-11-24 DIAGNOSIS — N92.0 MENORRHAGIA WITH REGULAR CYCLE: Primary | ICD-10-CM

## 2021-11-24 DIAGNOSIS — D50.9 IRON DEFICIENCY ANEMIA, UNSPECIFIED IRON DEFICIENCY ANEMIA TYPE: ICD-10-CM

## 2021-11-24 PROCEDURE — 96366 THER/PROPH/DIAG IV INF ADDON: CPT

## 2021-11-24 PROCEDURE — 96365 THER/PROPH/DIAG IV INF INIT: CPT

## 2021-11-24 RX ORDER — SODIUM CHLORIDE 9 MG/ML
20 INJECTION, SOLUTION INTRAVENOUS ONCE
Status: COMPLETED | OUTPATIENT
Start: 2021-11-24 | End: 2021-11-24

## 2021-11-24 RX ORDER — SODIUM CHLORIDE 9 MG/ML
20 INJECTION, SOLUTION INTRAVENOUS ONCE
Status: CANCELLED | OUTPATIENT
Start: 2021-12-03

## 2021-11-24 RX ADMIN — IRON SUCROSE 300 MG: 20 INJECTION, SOLUTION INTRAVENOUS at 15:13

## 2021-11-24 RX ADMIN — SODIUM CHLORIDE 20 ML/HR: 0.9 INJECTION, SOLUTION INTRAVENOUS at 15:13

## 2021-11-29 ENCOUNTER — OFFICE VISIT (OUTPATIENT)
Dept: OBGYN CLINIC | Facility: CLINIC | Age: 46
End: 2021-11-29

## 2021-11-29 VITALS
BODY MASS INDEX: 25.61 KG/M2 | HEART RATE: 79 BPM | SYSTOLIC BLOOD PRESSURE: 148 MMHG | DIASTOLIC BLOOD PRESSURE: 84 MMHG | WEIGHT: 169 LBS | HEIGHT: 68 IN

## 2021-11-29 DIAGNOSIS — N93.9 ABNORMAL UTERINE BLEEDING (AUB): Primary | ICD-10-CM

## 2021-11-29 PROCEDURE — 99213 OFFICE O/P EST LOW 20 MIN: CPT | Performed by: OBSTETRICS & GYNECOLOGY

## 2021-12-03 ENCOUNTER — HOSPITAL ENCOUNTER (OUTPATIENT)
Dept: INFUSION CENTER | Facility: HOSPITAL | Age: 46
Discharge: HOME | End: 2021-12-03
Attending: INTERNAL MEDICINE
Payer: COMMERCIAL

## 2021-12-03 VITALS
TEMPERATURE: 97 F | SYSTOLIC BLOOD PRESSURE: 142 MMHG | HEART RATE: 79 BPM | RESPIRATION RATE: 18 BRPM | DIASTOLIC BLOOD PRESSURE: 78 MMHG

## 2021-12-03 DIAGNOSIS — N92.0 MENORRHAGIA WITH REGULAR CYCLE: Primary | ICD-10-CM

## 2021-12-03 DIAGNOSIS — D50.9 IRON DEFICIENCY ANEMIA, UNSPECIFIED IRON DEFICIENCY ANEMIA TYPE: ICD-10-CM

## 2021-12-03 PROCEDURE — 96365 THER/PROPH/DIAG IV INF INIT: CPT

## 2021-12-03 PROCEDURE — 96366 THER/PROPH/DIAG IV INF ADDON: CPT

## 2021-12-03 RX ORDER — SODIUM CHLORIDE 9 MG/ML
20 INJECTION, SOLUTION INTRAVENOUS ONCE
Status: COMPLETED | OUTPATIENT
Start: 2021-12-03 | End: 2021-12-03

## 2021-12-03 RX ORDER — SODIUM CHLORIDE 9 MG/ML
20 INJECTION, SOLUTION INTRAVENOUS ONCE
Status: CANCELLED | OUTPATIENT
Start: 2021-12-08

## 2021-12-03 RX ADMIN — SODIUM CHLORIDE 20 ML/HR: 0.9 INJECTION, SOLUTION INTRAVENOUS at 13:49

## 2021-12-03 RX ADMIN — IRON SUCROSE 300 MG: 20 INJECTION, SOLUTION INTRAVENOUS at 13:49

## 2021-12-17 ENCOUNTER — TELEPHONE (OUTPATIENT)
Dept: OTHER | Facility: HOSPITAL | Age: 46
End: 2021-12-17

## 2021-12-20 ENCOUNTER — HOSPITAL ENCOUNTER (OUTPATIENT)
Dept: RADIOLOGY | Age: 46
Discharge: HOME | End: 2021-12-20
Payer: COMMERCIAL

## 2021-12-20 VITALS — WEIGHT: 165 LBS | HEIGHT: 66 IN | BODY MASS INDEX: 26.52 KG/M2

## 2021-12-20 DIAGNOSIS — Z12.31 ENCOUNTER FOR SCREENING MAMMOGRAM FOR MALIGNANT NEOPLASM OF BREAST: ICD-10-CM

## 2021-12-20 PROCEDURE — 77063 BREAST TOMOSYNTHESIS BI: CPT

## 2021-12-20 PROCEDURE — 77067 SCR MAMMO BI INCL CAD: CPT

## 2021-12-22 ENCOUNTER — OFFICE VISIT (OUTPATIENT)
Dept: OBGYN CLINIC | Facility: CLINIC | Age: 46
End: 2021-12-22

## 2021-12-22 VITALS
HEART RATE: 76 BPM | HEIGHT: 66 IN | SYSTOLIC BLOOD PRESSURE: 166 MMHG | WEIGHT: 171 LBS | BODY MASS INDEX: 27.48 KG/M2 | DIASTOLIC BLOOD PRESSURE: 88 MMHG

## 2021-12-22 DIAGNOSIS — N93.9 ABNORMAL UTERINE BLEEDING (AUB): Primary | ICD-10-CM

## 2021-12-22 LAB — SL AMB POCT URINE HCG: NEGATIVE

## 2021-12-22 PROCEDURE — 58100 BIOPSY OF UTERUS LINING: CPT | Performed by: OBSTETRICS & GYNECOLOGY

## 2021-12-22 PROCEDURE — 88305 TISSUE EXAM BY PATHOLOGIST: CPT | Performed by: PATHOLOGY

## 2021-12-22 PROCEDURE — 81025 URINE PREGNANCY TEST: CPT | Performed by: OBSTETRICS & GYNECOLOGY

## 2022-01-04 ENCOUNTER — PREP FOR PROCEDURE (OUTPATIENT)
Dept: OBGYN CLINIC | Facility: CLINIC | Age: 47
End: 2022-01-04

## 2022-01-04 DIAGNOSIS — N93.9 ABNORMAL UTERINE BLEEDING (AUB): Primary | ICD-10-CM

## 2022-01-13 ENCOUNTER — ANESTHESIA EVENT (OUTPATIENT)
Dept: PERIOP | Facility: HOSPITAL | Age: 47
End: 2022-01-13
Payer: COMMERCIAL

## 2022-01-14 RX ORDER — ACETAMINOPHEN 500 MG
500 TABLET ORAL EVERY 6 HOURS PRN
COMMUNITY

## 2022-01-14 NOTE — PRE-PROCEDURE INSTRUCTIONS
Pre-Surgery Instructions:   Medication Instructions    acetaminophen (TYLENOL) 500 mg tablet Instructed patient per Anesthesia Guidelines  Take morning of surgery with sip water if needed   Information obtained and instructions given with assistance of [employer]  #[id number]  Covid screening negative as per patient  Reviewed pre op medicine and showering instructions with patient via phone call, verbalizes understanding  Advised patient to stop taking non prescribed vitamins, herbal meds and ASA 7 days pre op  Advised to stop taking NSAID's 3 days pre op but may take Tylenol products if needed        Advised NPO after MN (1/20) and surgical services will call (1/20) with scheduled time of hospital arrival

## 2022-01-17 ENCOUNTER — APPOINTMENT (OUTPATIENT)
Dept: LAB | Facility: HOSPITAL | Age: 47
End: 2022-01-17
Payer: COMMERCIAL

## 2022-01-17 DIAGNOSIS — Z11.59 NEED FOR HEPATITIS C SCREENING TEST: ICD-10-CM

## 2022-01-17 DIAGNOSIS — N92.0 MENORRHAGIA WITH REGULAR CYCLE: ICD-10-CM

## 2022-01-17 DIAGNOSIS — D50.9 IRON DEFICIENCY ANEMIA, UNSPECIFIED IRON DEFICIENCY ANEMIA TYPE: ICD-10-CM

## 2022-01-17 DIAGNOSIS — Z13.220 SCREENING, LIPID: ICD-10-CM

## 2022-01-17 DIAGNOSIS — Z13.1 SCREENING FOR DIABETES MELLITUS: ICD-10-CM

## 2022-01-17 LAB
CHOLEST SERPL-MCNC: 172 MG/DL
ERYTHROCYTE [DISTWIDTH] IN BLOOD BY AUTOMATED COUNT: 18.2 % (ref 11.6–15.1)
EST. AVERAGE GLUCOSE BLD GHB EST-MCNC: 105 MG/DL
FERRITIN SERPL-MCNC: 11 NG/ML (ref 8–388)
HBA1C MFR BLD: 5.3 %
HCT VFR BLD AUTO: 38.6 % (ref 34.8–46.1)
HCV AB SER QL: NORMAL
HDLC SERPL-MCNC: 68 MG/DL
HGB BLD-MCNC: 12.1 G/DL (ref 11.5–15.4)
IRON SATN MFR SERPL: 8 % (ref 15–50)
IRON SERPL-MCNC: 30 UG/DL (ref 50–170)
LDLC SERPL CALC-MCNC: 91 MG/DL (ref 0–100)
MCH RBC QN AUTO: 25.6 PG (ref 26.8–34.3)
MCHC RBC AUTO-ENTMCNC: 31.3 G/DL (ref 31.4–37.4)
MCV RBC AUTO: 82 FL (ref 82–98)
NONHDLC SERPL-MCNC: 104 MG/DL
PLATELET # BLD AUTO: 361 THOUSANDS/UL (ref 149–390)
PMV BLD AUTO: 9.5 FL (ref 8.9–12.7)
RBC # BLD AUTO: 4.72 MILLION/UL (ref 3.81–5.12)
TIBC SERPL-MCNC: 353 UG/DL (ref 250–450)
TRIGL SERPL-MCNC: 67 MG/DL
WBC # BLD AUTO: 6.32 THOUSAND/UL (ref 4.31–10.16)

## 2022-01-17 PROCEDURE — 80061 LIPID PANEL: CPT

## 2022-01-17 PROCEDURE — 82728 ASSAY OF FERRITIN: CPT

## 2022-01-17 PROCEDURE — 83036 HEMOGLOBIN GLYCOSYLATED A1C: CPT

## 2022-01-17 PROCEDURE — 85027 COMPLETE CBC AUTOMATED: CPT

## 2022-01-17 PROCEDURE — 83550 IRON BINDING TEST: CPT

## 2022-01-17 PROCEDURE — 36415 COLL VENOUS BLD VENIPUNCTURE: CPT

## 2022-01-17 PROCEDURE — 83540 ASSAY OF IRON: CPT

## 2022-01-17 PROCEDURE — 86803 HEPATITIS C AB TEST: CPT

## 2022-01-20 NOTE — ANESTHESIA PREPROCEDURE EVALUATION
Procedure:  DILATATION AND CURETTAGE (D&C) WITH HYSTEROSCOPY (N/A Uterus)  HYSTEROSCOPY ABLATION ENDOMETRIAL NOVASURE (N/A Uterus)    Relevant Problems   ANESTHESIA (within normal limits)   (-) History of anesthesia complications      CARDIO (within normal limits)      ENDO (within normal limits)      GI/HEPATIC (within normal limits)      /RENAL (within normal limits)      GYN   (-) Currently pregnant      HEMATOLOGY   (+) Iron deficiency anemia      MUSCULOSKELETAL   (+) Mid back pain      NEURO/PSYCH (within normal limits)      Other   (+) Abnormal uterine bleeding (AUB)        Physical Exam    Airway    Mallampati score: II  TM Distance: >3 FB  Neck ROM: full     Dental   No notable dental hx     Cardiovascular  Rhythm: regular, Rate: normal, Cardiovascular exam normal    Pulmonary  Pulmonary exam normal Breath sounds clear to auscultation,     Other Findings        Anesthesia Plan  ASA Score- 2     Anesthesia Type- general with ASA Monitors  Additional Monitors:   Airway Plan: LMA  Plan Factors-Exercise tolerance (METS): >4 METS  Chart reviewed  Imaging results reviewed  Existing labs reviewed  Patient summary reviewed  Patient is not a current smoker  Patient did not smoke on day of surgery  Induction- intravenous  Postoperative Plan-   Planned trial extubation    Informed Consent- Anesthetic plan and risks discussed with patient and daughter  NPO verified  NKDA  Urine pregnancy test negative today, 1/21/22  Plan:  GA, LMA    Risks and benefits of general anesthesia were discussed with the patient  Discussed risks of anesthesia including, but not limited to, the risk of dental injury, n/v, sore throat, corneal abrasions, and arrhythmias  All questions were answered  Anesthesia consent was obtained from the patient

## 2022-01-21 ENCOUNTER — ANESTHESIA (OUTPATIENT)
Dept: PERIOP | Facility: HOSPITAL | Age: 47
End: 2022-01-21
Payer: COMMERCIAL

## 2022-01-21 ENCOUNTER — HOSPITAL ENCOUNTER (OUTPATIENT)
Facility: HOSPITAL | Age: 47
Setting detail: OUTPATIENT SURGERY
Discharge: HOME | End: 2022-01-21
Attending: OBSTETRICS & GYNECOLOGY | Admitting: OBSTETRICS & GYNECOLOGY
Payer: COMMERCIAL

## 2022-01-21 VITALS
HEIGHT: 66 IN | WEIGHT: 171 LBS | SYSTOLIC BLOOD PRESSURE: 134 MMHG | BODY MASS INDEX: 27.48 KG/M2 | RESPIRATION RATE: 18 BRPM | TEMPERATURE: 97.8 F | HEART RATE: 75 BPM | OXYGEN SATURATION: 96 % | DIASTOLIC BLOOD PRESSURE: 69 MMHG

## 2022-01-21 DIAGNOSIS — N93.9 ABNORMAL UTERINE BLEEDING (AUB): ICD-10-CM

## 2022-01-21 DIAGNOSIS — G89.18 POST-OP PAIN: ICD-10-CM

## 2022-01-21 DIAGNOSIS — Z98.890 S/P ENDOMETRIAL ABLATION: Primary | ICD-10-CM

## 2022-01-21 LAB
EXT PREGNANCY TEST URINE: NEGATIVE
EXT. CONTROL: NORMAL

## 2022-01-21 PROCEDURE — 88305 TISSUE EXAM BY PATHOLOGIST: CPT | Performed by: PATHOLOGY

## 2022-01-21 PROCEDURE — 81025 URINE PREGNANCY TEST: CPT | Performed by: ANESTHESIOLOGY

## 2022-01-21 PROCEDURE — NC001 PR NO CHARGE: Performed by: OBSTETRICS & GYNECOLOGY

## 2022-01-21 PROCEDURE — 58563 HYSTEROSCOPY ABLATION: CPT | Performed by: OBSTETRICS & GYNECOLOGY

## 2022-01-21 RX ORDER — FENTANYL CITRATE 50 UG/ML
INJECTION, SOLUTION INTRAMUSCULAR; INTRAVENOUS AS NEEDED
Status: DISCONTINUED | OUTPATIENT
Start: 2022-01-21 | End: 2022-01-21

## 2022-01-21 RX ORDER — IBUPROFEN 600 MG/1
600 TABLET ORAL EVERY 6 HOURS PRN
Status: DISCONTINUED | OUTPATIENT
Start: 2022-01-21 | End: 2022-01-21 | Stop reason: HOSPADM

## 2022-01-21 RX ORDER — ONDANSETRON 2 MG/ML
4 INJECTION INTRAMUSCULAR; INTRAVENOUS EVERY 6 HOURS PRN
Status: DISCONTINUED | OUTPATIENT
Start: 2022-01-21 | End: 2022-01-21 | Stop reason: HOSPADM

## 2022-01-21 RX ORDER — ONDANSETRON 2 MG/ML
INJECTION INTRAMUSCULAR; INTRAVENOUS AS NEEDED
Status: DISCONTINUED | OUTPATIENT
Start: 2022-01-21 | End: 2022-01-21

## 2022-01-21 RX ORDER — KETOROLAC TROMETHAMINE 30 MG/ML
INJECTION, SOLUTION INTRAMUSCULAR; INTRAVENOUS AS NEEDED
Status: DISCONTINUED | OUTPATIENT
Start: 2022-01-21 | End: 2022-01-21

## 2022-01-21 RX ORDER — DEXAMETHASONE SODIUM PHOSPHATE 10 MG/ML
INJECTION, SOLUTION INTRAMUSCULAR; INTRAVENOUS AS NEEDED
Status: DISCONTINUED | OUTPATIENT
Start: 2022-01-21 | End: 2022-01-21

## 2022-01-21 RX ORDER — PROMETHAZINE HYDROCHLORIDE 25 MG/ML
12.5 INJECTION, SOLUTION INTRAMUSCULAR; INTRAVENOUS ONCE AS NEEDED
Status: DISCONTINUED | OUTPATIENT
Start: 2022-01-21 | End: 2022-01-21 | Stop reason: HOSPADM

## 2022-01-21 RX ORDER — SODIUM CHLORIDE, SODIUM LACTATE, POTASSIUM CHLORIDE, CALCIUM CHLORIDE 600; 310; 30; 20 MG/100ML; MG/100ML; MG/100ML; MG/100ML
100 INJECTION, SOLUTION INTRAVENOUS CONTINUOUS
Status: DISCONTINUED | OUTPATIENT
Start: 2022-01-21 | End: 2022-01-21 | Stop reason: HOSPADM

## 2022-01-21 RX ORDER — LIDOCAINE HYDROCHLORIDE 10 MG/ML
0.5 INJECTION, SOLUTION EPIDURAL; INFILTRATION; INTRACAUDAL; PERINEURAL ONCE AS NEEDED
Status: DISCONTINUED | OUTPATIENT
Start: 2022-01-21 | End: 2022-01-21 | Stop reason: HOSPADM

## 2022-01-21 RX ORDER — IBUPROFEN 600 MG/1
600 TABLET ORAL EVERY 6 HOURS PRN
Qty: 30 TABLET | Refills: 0 | Status: SHIPPED | OUTPATIENT
Start: 2022-01-21 | End: 2022-02-07 | Stop reason: ALTCHOICE

## 2022-01-21 RX ORDER — ONDANSETRON 2 MG/ML
4 INJECTION INTRAMUSCULAR; INTRAVENOUS ONCE AS NEEDED
Status: DISCONTINUED | OUTPATIENT
Start: 2022-01-21 | End: 2022-01-21 | Stop reason: HOSPADM

## 2022-01-21 RX ORDER — OXYCODONE HYDROCHLORIDE AND ACETAMINOPHEN 5; 325 MG/1; MG/1
1-2 TABLET ORAL EVERY 6 HOURS PRN
Qty: 8 TABLET | Refills: 0 | Status: SHIPPED | OUTPATIENT
Start: 2022-01-21 | End: 2022-01-31

## 2022-01-21 RX ORDER — LIDOCAINE HYDROCHLORIDE 10 MG/ML
INJECTION, SOLUTION EPIDURAL; INFILTRATION; INTRACAUDAL; PERINEURAL AS NEEDED
Status: DISCONTINUED | OUTPATIENT
Start: 2022-01-21 | End: 2022-01-21

## 2022-01-21 RX ORDER — OXYCODONE HYDROCHLORIDE 5 MG/1
5 TABLET ORAL EVERY 4 HOURS PRN
Status: DISCONTINUED | OUTPATIENT
Start: 2022-01-21 | End: 2022-01-21 | Stop reason: HOSPADM

## 2022-01-21 RX ORDER — FENTANYL CITRATE/PF 50 MCG/ML
50 SYRINGE (ML) INJECTION
Status: DISCONTINUED | OUTPATIENT
Start: 2022-01-21 | End: 2022-01-21 | Stop reason: HOSPADM

## 2022-01-21 RX ORDER — SODIUM CHLORIDE, SODIUM LACTATE, POTASSIUM CHLORIDE, CALCIUM CHLORIDE 600; 310; 30; 20 MG/100ML; MG/100ML; MG/100ML; MG/100ML
75 INJECTION, SOLUTION INTRAVENOUS CONTINUOUS
Status: DISCONTINUED | OUTPATIENT
Start: 2022-01-21 | End: 2022-01-21 | Stop reason: HOSPADM

## 2022-01-21 RX ORDER — PROPOFOL 10 MG/ML
INJECTION, EMULSION INTRAVENOUS AS NEEDED
Status: DISCONTINUED | OUTPATIENT
Start: 2022-01-21 | End: 2022-01-21

## 2022-01-21 RX ORDER — ACETAMINOPHEN 325 MG/1
975 TABLET ORAL EVERY 6 HOURS PRN
Status: DISCONTINUED | OUTPATIENT
Start: 2022-01-21 | End: 2022-01-21 | Stop reason: HOSPADM

## 2022-01-21 RX ADMIN — FENTANYL CITRATE 25 MCG: 50 INJECTION INTRAMUSCULAR; INTRAVENOUS at 13:56

## 2022-01-21 RX ADMIN — Medication 50 MCG: at 15:07

## 2022-01-21 RX ADMIN — SODIUM CHLORIDE, SODIUM LACTATE, POTASSIUM CHLORIDE, AND CALCIUM CHLORIDE: .6; .31; .03; .02 INJECTION, SOLUTION INTRAVENOUS at 13:47

## 2022-01-21 RX ADMIN — KETOROLAC TROMETHAMINE 30 MG: 30 INJECTION, SOLUTION INTRAMUSCULAR at 14:21

## 2022-01-21 RX ADMIN — DEXAMETHASONE SODIUM PHOSPHATE 4 MG: 10 INJECTION, SOLUTION INTRAMUSCULAR; INTRAVENOUS at 13:54

## 2022-01-21 RX ADMIN — PROPOFOL 170 MG: 10 INJECTION, EMULSION INTRAVENOUS at 13:53

## 2022-01-21 RX ADMIN — LIDOCAINE HYDROCHLORIDE 100 MG: 10 INJECTION, SOLUTION EPIDURAL; INFILTRATION; INTRACAUDAL; PERINEURAL at 13:53

## 2022-01-21 RX ADMIN — FENTANYL CITRATE 25 MCG: 50 INJECTION INTRAMUSCULAR; INTRAVENOUS at 14:21

## 2022-01-21 RX ADMIN — ONDANSETRON 4 MG: 2 INJECTION INTRAMUSCULAR; INTRAVENOUS at 14:18

## 2022-01-21 NOTE — ANESTHESIA POSTPROCEDURE EVALUATION
Post-Op Assessment Note    CV Status:  Stable    Pain management: adequate     Mental Status:  Alert and awake   Hydration Status:  Euvolemic and stable   PONV Controlled:  None   Airway Patency:  Patent and adequate      Post Op Vitals Reviewed: Yes      Staff: Anesthesiologist         No complications documented  /82 (01/21/22 1436)    Temp 97 9 °F (36 6 °C) (01/21/22 1436)    Pulse 77 (01/21/22 1436)   Resp 14 (01/21/22 1436)    SpO2 100 % (01/21/22 1436)      Patient transported to PACU extubated and on supplemental O2  Patient was awake and alert, with stable vital signs  Signout was given to PACU RN

## 2022-01-21 NOTE — DISCHARGE INSTRUCTIONS
Post-Gynecologic Surgery Discharge Instructions:  1  No heavy lifting more than one full gallon of milk (about 8 lbs) for 1 week  2  Nothing in the vagina for  6 weeks, or until cleared at your post-operative visit  3  You may take stairs one at a time, touching each step with both feet for the first few days, then as tolerated  4  Call the office for fever greater than 100  4'F, heavy vaginal bleeding, or increasing pain  5  Activity as tolerated  6   Avoid driving if taking narcotic pain medications (Roxicodone, Percocet, Vicodin)  Post Operative Pain Management:  For pain, take 650 mg of tylenol every 6 hours  For cramping, take 600 mg Ibuprofen every 6 hours to relieve  You may alternate these and take them "around the clock" to stay ahead of pain  For example, take 650mg of tylenol at 9 AM, then 600mg of ibuprofen at 12 PM, then 650 of tylenol at 3 PM, and so forth  If you have any questions regarding your prescriptions please call your doctor  Ablación endometrial   CUIDADO AMBULATORIO:   Lo que usted necesita saber sobre la ablación endometrial: La ablación endometrial es un procedimiento para extraer el endometrio (el revestimiento del útero)  Es posible que deba hacerse robert procedimiento si sangra mucho o de forma anormal por la vagina  Cómo kelley prepararme para robert procedimiento: Rankin médico le indicará cómo prepararse para el procedimiento  Es posible que le indiquen que no coma ni tome nada pasada la medianoche del día del procedimiento  Theron Coins qué medicamentos puede em el día del procedimiento  Es probable que usted necesite que alguien lo lleve a casa después del procedimiento y se quede con usted para asegurarse de que usted esté audra  Qué sucederá ishmael el procedimiento: Podrían aplicarle anestesia local para adormecer Jereld Gilbert  Es posible que en lugar de eso le administren anestesia general para mantenerlo dormido y sin dolor ishmael el procedimiento   Rnonie Belcher usará un medicamento o un instrumento médico para ensanchar el kay de fox útero  Es probable que usen hielo, líquido caliente o energía eléctrica para destruir el revestimiento de fox útero  Qué esperar después del procedimiento: Es probable que usted sienta un poco de molestia por unos cuantos días  [place] es normal y debe parar pronto  Comuníquese con fox médico si alguno de los siguientes síntomas se vuelven severos o continúan:  · Usted tendrá calambres parecidos a los rachael menstruales por 1 a 2 días    · Tendrá kristen secreción acuosa y sangrienta por 2 a 3 días que podría hacerse más liviana y durar varias semanas    · Orina frecuente por 24 horas    · Náuseas    Riesgos de la ablación endometrial: Es posible que usted no pueda quedar embarazada después de la ablación endometrial  Usted podría sangrar más de lo esperado o contraer kristen infección en la vagina, el tracto urinario o el [place]  Se le podrían quemar o dañar el kay uterino, el útero o los órganos que se encuentran a fox alrededor  Se le puede formar un coágulo sanguíneo en la pierna o el brazo  Se le podría formar un bloqueo con el pasar de los meses a años y causar que se le acumule nataly dentro del [place]  Emily bloqueo podría causar dolor severo y usted podría requerir de kristen histerectomía  Es probable también que usted necesite kristen histerectomía si la ablación endometrial no funciona  Llame al 911 si:  · Usted se siente mareada, le hace falta el aire y tiene dolor de [place]  · Usted expectora nataly  Busque atención médica de inmediato si:  · Fox brazo o pierna se siente caliente, sensible y [place]  Se podría liberty inflamado y morgan  · Usted se siente mareado, débil y confundido  · Usted no puede dejar de vomitar  · Usted tiene dolor intenso  · Usted no puede orinar  Comuníquese con fox médico si:  · Tiene fiebre      · Usted tiene sangrado vaginal y no es aún hora de que le llegue el periodo menstrual     · El sangrado ishmael fox periodo no ha disminuido  · Usted tiene dolor al orinar o ve nataly en fox orina  · Usted tiene preguntas o inquietudes acerca de fox condición o cuidado  Medicamentos:  · Los medicamentos pueden ayudar a disminuir el dolor, calmar el estómago y controlar los vómitos  · [place] bridgett medicamentos harinder se le haya indicado  Consulte con fox médico si usted tahmina que fox medicamento no le está ayudando o si presenta efectos secundarios  Infórmele si es alérgico a cualquier medicamento  Mantenga kristen lista actualizada de los [place], las vitaminas y los productos herbales que araseli  Incluya los siguientes datos de los medicamentos: cantidad, frecuencia y motivo de administración  Traiga con usted la lista o los envases de las píldoras a bridgett citas de seguimiento  Lleve la lista de los medicamentos con usted en ailyn de kristen emergencia  Actividad: Pregunte cuándo puede volver a bridgett actividades habituales  No tenga relaciones sexuales, no use tampones ni tome duchas vaginales por 6 semanas después de fox procedimiento, o según indicaciones dadas  Control de la natalidad: Es posible que usted deba usar algún método anticonceptivo después del procedimiento para evitar un Molinda Minus  Correrá mayor riesgo de tener problemas con el embarazo, harinder un aborto espontáneo o un embarazo ectópico después de lamberto tenido robert procedimiento  Hable con fox médico acerca del control de la natalidad o el embarazo después de kristen ablación endometrial   Acuda a la consulta de control con fox médico según las indicaciones: Anote bridgett preguntas para que se acuerde de hacerlas ishmael bridgett visitas  © Copyright [employer] 2021 Information is for End User's use only and may not be sold, redistributed or otherwise used for commercial purposes  All illustrations and images included in CareNotes® are the copyrighted property of A TERE A Tiago CHATTERJEE  or [address] [place] [address] es sólo para uso en educación   Fox intención no es darle un consejo [employer] o tratamientos  Colsulte con fox Gala Primer farmacéutico antes de seguir cualquier régimen médico para saber si es seguro y efectivo para usted

## 2022-01-21 NOTE — OP NOTE
PERATIVE REPORT  PATIENT NAME: Jewel Amanda    :  1975  MRN: [id number]  Pt Location: [facility] OR ROOM 05    SURGERY DATE: 2022    Surgeon(s) and Role:     * Alin Brown MD - Primary     * Arabella Henderson DO - Assisting    Preop Diagnosis:  Abnormal uterine bleeding (AUB) [N93 9]  Iron Deficiency Anemia secondary to chronic blood loss   Hx of blood transfusions and venofer infusions    Post-Op Diagnosis Codes:     * Abnormal uterine bleeding (AUB) [N93 9]  S/p D&C  S/p Endometrial Ablation    Procedure(s) (LRB):  DILATATION AND CURETTAGE (D&C) WITH HYSTEROSCOPY (N/A)  HYSTEROSCOPY ABLATION ENDOMETRIAL NOVASURE (N/A)    Specimen(s):  ID Type Source Tests Collected by Time Destination   1 :  Tissue Endometrium TISSUE EXAM Alin Brown MD 2022 1407        Estimated Blood Loss: 10cc    Drains: None    Anesthesia Type:   General LMA    Operative Indications:  Abnormal uterine bleeding (AUB) [N93 9]  Iron Deficiency Anemia secondary to chronic blood loss   Hx of blood transfusions and venofer infusions  Hgb ranging between 6 6 and 7 8    Operative Findings:   1  External genitalia grossly normal in appearance  No ulcerations, no lacerations, no lesions  Bimanual exam revealed small anteverted uterus with normal contours and freely mobile  No adnexal masses palpated bilaterally  2   Vagina and cervix were grossly normal in appearance without any lacerations or lesions  No descensus upon application of tenaculum  3  Uterus sounded to 9 cm  4  Hysteroscopic examination revealed thick, fluffy, hypertrophic endometrial lining  Bilateral ostia were visualized  No polyps/fibroids noted  Complications:   None    Procedure and Technique:  Patient was identified in the preop holding area as well as the operating suite  Procedure was reviewed and patient consented verbally once again  She underwent successful induction of general anesthesia using LMA    She was placed in the dorsal lithotomy position using yellowfin stirrups  She had pneumatic compression boots in place  She had a Betadine vaginal prep and was draped in sterile fashion  An operative timeout was accomplished  Exam under anesthesia revealed no vaginal or cervical lesions  The cervix was parous  Uterus was anteverted and normal in size  There were no adnexal masses noted  A Alcantara speculum was introduced and the anterior lip of the cervix was grasped using a single-tooth tenaculum  Uterus sounded in midposition fashion to 9 cm  The endocervix was dilated to accommodate the 7 5 mm diagnostic hysteroscope  The hysteroscope was then introduced with saline as a distention medium  Excellent visualization of the endocervix and endometrial cavity was accomplished  The hysteroscope was inserted and using normal saline as the distension fluid the endometrium was visualized with the above operative findings  Hysteroscope was withdrawn and a medium sharp curette was used to collect endometrial curettage with specimen being sent for routine pathology  Elena  The NovaSure ablation device was inserted through the cervix and seated into the endometrial cavity  Device was deployed and rotated in all directions to maximize expansion of the bipolar electrode  Uterine length was determined to be 5 cm and uterine width was determined to be 4 cm  A test of the system was performed and the uterine cavity was insufflated with CO2 to ensure cavity integrity and proper placement of the device  No leakage of gas was appreciated  After successful testing, the Novasure system was activated and bipolar cauterization with a Moisture Transport Vacuum System facilitated ablation of the endometrium in approximately 1 min 17 seconds seconds under 138 flowers of power  The Novasure system was completely retracted prior to it's removal from the uterine cavity  Inspection of the bipolar electrode showed evidence of burnt endometrial tissue      The tenaculum was removed  The patient had excellent hemostasis at this time  She was cleansed and was awakened from anesthesia without incident and was taken to the recovery room in satisfactory condition  Dr Fercho Ramírez was present and participated in the entire surgery        Patient Disposition:  PACU  and extubated and stable      SIGNATURE: Estephania Alejandra DO  DATE: January 21, 2022  TIME: 2:36 PM

## 2022-02-07 ENCOUNTER — OFFICE VISIT (OUTPATIENT)
Dept: HEMATOLOGY ONCOLOGY | Facility: CLINIC | Age: 47
End: 2022-02-07
Payer: COMMERCIAL

## 2022-02-07 VITALS
HEIGHT: 66 IN | DIASTOLIC BLOOD PRESSURE: 80 MMHG | WEIGHT: 198 LBS | HEART RATE: 75 BPM | TEMPERATURE: 97.5 F | SYSTOLIC BLOOD PRESSURE: 120 MMHG | OXYGEN SATURATION: 100 % | BODY MASS INDEX: 31.82 KG/M2 | RESPIRATION RATE: 16 BRPM

## 2022-02-07 DIAGNOSIS — D50.9 IRON DEFICIENCY ANEMIA, UNSPECIFIED IRON DEFICIENCY ANEMIA TYPE: Primary | ICD-10-CM

## 2022-02-07 DIAGNOSIS — N92.0 MENORRHAGIA WITH REGULAR CYCLE: ICD-10-CM

## 2022-02-07 DIAGNOSIS — Z98.890 S/P ENDOMETRIAL ABLATION: ICD-10-CM

## 2022-02-07 PROCEDURE — 99213 OFFICE O/P EST LOW 20 MIN: CPT | Performed by: NURSE PRACTITIONER

## 2022-02-07 RX ORDER — SODIUM CHLORIDE 9 MG/ML
20 INJECTION, SOLUTION INTRAVENOUS ONCE
Status: CANCELLED | OUTPATIENT
Start: 2022-02-18

## 2022-02-07 NOTE — PROGRESS NOTES
[id number] [facility] [address] [address] [phone]  Hematology Ambulatory Follow-Up  Luz Maria Ward, 1975, [id number]  2/7/2022    Assessment/Plan:    1  Menorrhagia with regular cycle  2  Iron deficiency anemia, unspecified iron deficiency anemia type  3  S/P endometrial ablation   Patient is a 70-year-old female with a history of menorrhagia with subsequent iron deficiency  She is status post endometrial ablation on 01/25/2022  She can use to have spotting since the ablation  She has a follow-up with Gynecology on Wednesday 02/09/2022  Preop iron saturation was 8% with a ferritin level of 11 hemoglobin was 12 1  Her most recent Venofer infusion was 12/03/2021  She tolerated without difficulty  I am inclined to make arrangements for repeat infusions, Venofer 300 mg IV x3  We will request blood work at 3 months and again at 6 months making arrangements as indicated based on 3 month blood work  This visit was completed via [employer]  Nella Gaucher  Patient verbalized understanding and is in agreement with the plan  - CBC; Standing  - Comprehensive metabolic panel; Standing  - Iron Panel (Includes Ferritin, Iron Sat%, Iron, and TIBC); Standing  - CBC  - Comprehensive metabolic panel  - Iron Panel (Includes Ferritin, Iron Sat%, Iron, and TIBC)    Barrier(s) to care: None  The patient is able to self care  [address] [facility]    Interval history:  Clinically stable    Review of Systems   Constitutional: Positive for fatigue  Negative for activity change, appetite change, fever and unexpected weight change  Respiratory: Negative for cough and shortness of breath  Cardiovascular: Negative for chest pain and leg swelling  Gastrointestinal: Negative for abdominal pain, constipation, diarrhea and nausea     Endocrine: Negative for cold intolerance and heat intolerance  Genitourinary: Positive for vaginal bleeding (Spotting after endometrial ablation)  Musculoskeletal: Negative for arthralgias and myalgias  Skin: Negative  Neurological: Negative for dizziness, weakness and headaches  Hematological: Negative for adenopathy  Does not bruise/bleed easily         Patient Active Problem List   Diagnosis    Iron deficiency anemia    Menorrhagia with regular cycle    Epigastric pain    Mid back pain    Nausea    Symptomatic anemia    Hypokalemia    Schatzki's ring    Overweight with body mass index (BMI) of 25 to 25 9 in adult    Abnormal uterine bleeding (AUB)    S/P endometrial ablation       Past Medical History:   Diagnosis Date    Anemia     Back pain, acute     Gastritis     GERD (gastroesophageal reflux disease)     History of transfusion 08/2021    Non-cardiac chest pain     Shortness of breath     with low hgb       Past Surgical History:   Procedure Laterality Date    EGD      ENDOMETRIAL ABLATION N/A 1/21/2022    Procedure: HYSTEROSCOPY ABLATION ENDOMETRIAL Keith Sibley;  Surgeon: Daniel Calzada MD;  Location: [facility];  Service: Gynecology    ID HYSTEROSCOPY,W/ENDO BX N/A 1/21/2022    Procedure: DILATATION AND CURETTAGE (D&C) WITH HYSTEROSCOPY;  Surgeon: Daniel Calzada MD;  Location: [facility];  Service: Gynecology    TUBAL LIGATION         Family History   Problem Relation Age of Onset    Hypertension Mother     Stroke Mother     No Known Problems Father     Hypertension Maternal Grandmother     Stroke Maternal Grandmother     Hypertension Maternal Uncle     Stroke Maternal Uncle     Stroke Paternal Aunt     No Known Problems Sister     HIV Brother     No Known Problems Daughter     No Known Problems Son     No Known Problems Maternal Grandfather     No Known Problems Paternal Grandmother     No Known Problems Paternal Grandfather     No Known Problems Sister     No Known Problems Daughter Social History     Socioeconomic History    Marital status: Single     Spouse name: None    Number of children: None    Years of education: None    Highest education level: None   Occupational History    None   Tobacco Use    Smoking status: Never Smoker    Smokeless tobacco: Never Used   Vaping Use    Vaping Use: Never used   Substance and Sexual Activity    Alcohol use: Never    Drug use: Never    Sexual activity: Yes     Partners: Male     Birth control/protection: Female Sterilization   Other Topics Concern    None   Social History Narrative    None     Social Determinants of Health     Financial Resource Strain: High Risk    Difficulty of Paying Living Expenses: Hard   Food Insecurity: Food Insecurity Present    Worried About Running Out of Food in the Last Year: Sometimes true    Suzanne of Food in the Last Year: Sometimes true   Transportation Needs: No Transportation Needs    Lack of Transportation (Medical): No    Lack of Transportation (Non-Medical):  No   Physical Activity: Inactive    Days of Exercise per Week: 0 days    Minutes of Exercise per Session: 0 min   Stress: No Stress Concern Present    Feeling of Stress : Not at all   Social Connections: Socially Isolated    Frequency of Communication with Friends and Family: More than three times a week    Frequency of Social Gatherings with Friends and Family: More than three times a week    Attends Restorationism Services: Never    Active Member of Clubs or Organizations: No    Attends Club or Organization Meetings: Never    Marital Status: Never    Intimate Partner Violence: Not At Risk    Fear of Current or Ex-Partner: No    Emotionally Abused: No    Physically Abused: No    Sexually Abused: No   Housing Stability: Unknown    Unable to Pay for Housing in the Last Year: No    Number of [place] in the Last Year: Not on file    Unstable Housing in the Last Year: No       Current Outpatient Medications:    acetaminophen (TYLENOL) 500 mg tablet, Take 500 mg by mouth every 6 (six) hours as needed for mild pain, Disp: , Rfl:     No Known Allergies    Objective:  /80 (BP Location: Left arm, Patient Position: Sitting, Cuff Size: Adult)   Pulse 75   Temp 97 5 °F (36 4 °C) (Temporal)   Resp 16   Ht 5' 6" (1 676 m)   Wt 89 8 kg (198 lb)   SpO2 100%   BMI 31 96 kg/m²    Physical Exam  Constitutional:       Appearance: Normal appearance  She is well-developed  HENT:      Head: Normocephalic and atraumatic  Eyes:      Conjunctiva/sclera: Conjunctivae normal       Pupils: Pupils are equal, round, and reactive to light  Cardiovascular:      Rate and Rhythm: Normal rate and regular rhythm  Pulses: Normal pulses  Heart sounds: Normal heart sounds  No murmur heard  Pulmonary:      Effort: Pulmonary effort is normal  No respiratory distress  Breath sounds: Normal breath sounds  Abdominal:      General: Bowel sounds are normal       Palpations: Abdomen is soft  Musculoskeletal:         General: Normal range of motion  Cervical back: Normal range of motion and neck supple  Lymphadenopathy:      Cervical: No cervical adenopathy  Skin:     General: Skin is warm and dry  Capillary Refill: Capillary refill takes less than 2 seconds  Neurological:      Mental Status: She is alert and oriented to person, place, and time     Psychiatric:         Behavior: Behavior normal      Result Review  Labs:  Admission on 01/21/2022, Discharged on 01/21/2022   Component Date Value Ref Range Status    EXT Preg Test, Ur 01/21/2022 Negative  Negative Final    Control 01/21/2022 Valid  Valid Final    Case Report 01/21/2022    Final                    Value:Surgical Pathology Report                         Case: [id number]                                   Authorizing Provider:  Yunior Cox MD          Collected:           01/21/2022 1407              Ordering Location:     [address] [place] [address] Received:            01/21/2022 [facility]                                                      Pathologist:           Bryan Whitaker MD                                                              Specimen:    Endometrium                                                                                Final Diagnosis 01/21/2022    Final                    Value: This result contains rich text formatting which cannot be displayed here   Note 01/21/2022    Final                    Value: This result contains rich text formatting which cannot be displayed here   Additional Information 01/21/2022    Final                    Value: This result contains rich text formatting which cannot be displayed here  Kyle Meth Description 01/21/2022    Final                    Value: This result contains rich text formatting which cannot be displayed here     Appointment on 01/17/2022   Component Date Value Ref Range Status    WBC 01/17/2022 6 32  4 31 - 10 16 Thousand/uL Final    RBC 01/17/2022 4 72  3 81 - 5 12 Million/uL Final    Hemoglobin 01/17/2022 12 1  11 5 - 15 4 g/dL Final    Hematocrit 01/17/2022 38 6  34 8 - 46 1 % Final    MCV 01/17/2022 82  82 - 98 fL Final    MCH 01/17/2022 25 6* 26 8 - 34 3 pg Final    MCHC 01/17/2022 31 3* 31 4 - 37 4 g/dL Final    RDW 01/17/2022 18 2* 11 6 - 15 1 % Final    Platelets 14/50/9993 361  149 - 390 Thousands/uL Final    MPV 01/17/2022 9 5  8 9 - 12 7 fL Final    Cholesterol 01/17/2022 172  See Comment mg/dL Final    Cholesterol:         Pediatric <18 Years        Desirable          <170 mg/dL      Borderline High    170-199 mg/dL      High               >=200 mg/dL        Adult >=18 Years            Desirable         <200 mg/dL      Borderline High   200-239 mg/dL      High              >239 mg/dL      Triglycerides 01/17/2022 67  See Comment mg/dL Final    Triglyceride:     0-9Y            <75mg/dL     10Y-17Y         <90 mg/dL       >=18Y     Normal          <150 mg/dL     Borderline High 150-199 mg/dL     High            200-499 mg/dL        Very High       >499 mg/dL    Specimen collection should occur prior to N-Acetylcysteine or Metamizole administration due to the potential for falsely depressed results   HDL, Direct 01/17/2022 68  >=50 mg/dL Final    Specimen collection should occur prior to Metamizole administration due to the potential for falsley depressed results   LDL Calculated 01/17/2022 91  0 - 100 mg/dL Final    LDL Cholesterol:     Optimal           <100 mg/dl     Near Optimal      100-129 mg/dl     Above Optimal       Borderline High 130-159 mg/dl       High            160-189 mg/dl       Very High       >189 mg/dl         This screening LDL is a calculated result  It does not have the accuracy of the Direct Measured LDL in the monitoring of patients with hyperlipidemia and/or statin therapy  Direct Measure LDL (ZPN589) must be ordered separately in these patients   Non-HDL-Chol (CHOL-HDL) 01/17/2022 104  mg/dl Final    Hepatitis C Ab 01/17/2022 Non-reactive  Non-reactive Final    Hemoglobin A1C 01/17/2022 5 3  Normal 3 8-5 6%; PreDiabetic 5 7-6 4%; Diabetic >=6 5%; Glycemic control for adults with diabetes <7 0% % Final    EAG 01/17/2022 105  mg/dl Final    Iron Saturation 01/17/2022 8* 15 - 50 % Final    TIBC 01/17/2022 353  250 - 450 ug/dL Final    Iron 01/17/2022 30* 50 - 170 ug/dL Final    Patients treated with metal-binding drugs (ie  Deferoxamine) may have depressed iron values   Ferritin 01/17/2022 11  8 - 388 ng/mL Final     Please note: This report has been generated by a voice recognition software system  Therefore there may be syntax, spelling, and/or grammatical errors  Please call if you have any questions

## 2022-02-08 DIAGNOSIS — N92.0 MENORRHAGIA WITH REGULAR CYCLE: Primary | ICD-10-CM

## 2022-02-08 DIAGNOSIS — D50.9 IRON DEFICIENCY ANEMIA, UNSPECIFIED IRON DEFICIENCY ANEMIA TYPE: ICD-10-CM

## 2022-02-09 ENCOUNTER — OFFICE VISIT (OUTPATIENT)
Dept: OBGYN CLINIC | Facility: CLINIC | Age: 47
End: 2022-02-09

## 2022-02-09 VITALS
WEIGHT: 175 LBS | SYSTOLIC BLOOD PRESSURE: 160 MMHG | BODY MASS INDEX: 28.12 KG/M2 | HEART RATE: 76 BPM | DIASTOLIC BLOOD PRESSURE: 75 MMHG | HEIGHT: 66 IN

## 2022-02-09 DIAGNOSIS — Z98.890 S/P ENDOMETRIAL ABLATION: Primary | ICD-10-CM

## 2022-02-09 PROCEDURE — 99024 POSTOP FOLLOW-UP VISIT: CPT | Performed by: OBSTETRICS & GYNECOLOGY

## 2022-02-09 NOTE — PROGRESS NOTES
Postpartum/operative evaluation  Margarito Man 55 y o  female MRN: [id number]  Unit/Bed#:  Encounter: [id number]    Subjective     Margarito Man is a 55 y o  y o  female [id number] who presents for a postoperative visit after endometrial ablation D&C  Last 1/21/22 in setting of menorrhagia  She follow recently with hematology last 2/7/22 with plan to continue  venofer infusions  Preop iron saturation was 8% with a ferritin level of 11 hemoglobin was 12 1  Her surgery was uncomplicated, Bleeding pink  Bowel function is normal  Bladder function is normal  Patient is not sexually active  The following portions of the patient's history were reviewed and updated as appropriate: allergies, current medications, past family history, past medical history, past social history, past surgical history and problem list     Review of Systems  Pertinent items are noted in HPI       Objective     /64   Wt 59 4 kg (131 lb)   BMI 22 49 kg/m²    General:   appears stated age, cooperative, alert normal mood and affect   Neck: Neck: normal, supple,trachea midline, no masses   Heart: regular rate and rhythm, S1, S2 normal, no murmur, click, rub or gallop   Lungs: clear to auscultation bilaterally       Abdomen: soft, non-tender, without masses or organomegaly                             Assessment/Plan     Patient in late postoperatory period, after D&C and endometrial ablation  · Pathology report with fragments of endometrial polyp without hyperplasia or carcinoma  · Patient states one episode of menses after her procedure  She states significantly less flow, currently she is experiencing pink discharge  · Last Hgb 12 1 with plan to get another set of venofer infusions by hematology  · Patient is doing fine after surgery , normal bowel and urinary function  · Follow-up in annual examination or before if patient desired previous examination      Patient D/W Dr Edilma Paula, MD

## 2022-02-18 ENCOUNTER — OFFICE VISIT (OUTPATIENT)
Dept: DENTISTRY | Facility: CLINIC | Age: 47
End: 2022-02-18

## 2022-02-18 ENCOUNTER — HOSPITAL ENCOUNTER (OUTPATIENT)
Dept: INFUSION CENTER | Facility: HOSPITAL | Age: 47
Discharge: HOME | End: 2022-02-18
Attending: INTERNAL MEDICINE
Payer: COMMERCIAL

## 2022-02-18 ENCOUNTER — APPOINTMENT (OUTPATIENT)
Dept: LAB | Facility: HOSPITAL | Age: 47
End: 2022-02-18
Payer: COMMERCIAL

## 2022-02-18 VITALS
RESPIRATION RATE: 18 BRPM | TEMPERATURE: 96.7 F | SYSTOLIC BLOOD PRESSURE: 141 MMHG | DIASTOLIC BLOOD PRESSURE: 78 MMHG | HEART RATE: 92 BPM

## 2022-02-18 VITALS — HEART RATE: 74 BPM | DIASTOLIC BLOOD PRESSURE: 86 MMHG | SYSTOLIC BLOOD PRESSURE: 143 MMHG | TEMPERATURE: 98.5 F

## 2022-02-18 DIAGNOSIS — N92.0 MENORRHAGIA WITH REGULAR CYCLE: Primary | ICD-10-CM

## 2022-02-18 DIAGNOSIS — N92.0 MENORRHAGIA WITH REGULAR CYCLE: ICD-10-CM

## 2022-02-18 DIAGNOSIS — D50.9 IRON DEFICIENCY ANEMIA, UNSPECIFIED IRON DEFICIENCY ANEMIA TYPE: ICD-10-CM

## 2022-02-18 DIAGNOSIS — Z01.20 ENCOUNTER FOR DENTAL EXAMINATION AND CLEANING WITHOUT ABNORMAL FINDINGS: Primary | ICD-10-CM

## 2022-02-18 LAB
ALBUMIN SERPL BCP-MCNC: 3.5 G/DL (ref 3.5–5)
ALP SERPL-CCNC: 72 U/L (ref 46–116)
ALT SERPL W P-5'-P-CCNC: 22 U/L (ref 12–78)
ANION GAP SERPL CALCULATED.3IONS-SCNC: 4 MMOL/L (ref 4–13)
AST SERPL W P-5'-P-CCNC: 15 U/L (ref 5–45)
BILIRUB SERPL-MCNC: 0.79 MG/DL (ref 0.2–1)
BUN SERPL-MCNC: 9 MG/DL (ref 5–25)
CALCIUM SERPL-MCNC: 9.3 MG/DL (ref 8.3–10.1)
CHLORIDE SERPL-SCNC: 106 MMOL/L (ref 100–108)
CO2 SERPL-SCNC: 27 MMOL/L (ref 21–32)
CREAT SERPL-MCNC: 0.52 MG/DL (ref 0.6–1.3)
ERYTHROCYTE [DISTWIDTH] IN BLOOD BY AUTOMATED COUNT: 15.7 % (ref 11.6–15.1)
FERRITIN SERPL-MCNC: 6 NG/ML (ref 8–388)
GFR SERPL CREATININE-BSD FRML MDRD: 114 ML/MIN/1.73SQ M
GLUCOSE P FAST SERPL-MCNC: 100 MG/DL (ref 65–99)
HCT VFR BLD AUTO: 38.6 % (ref 34.8–46.1)
HGB BLD-MCNC: 11.9 G/DL (ref 11.5–15.4)
IRON SATN MFR SERPL: 13 % (ref 15–50)
IRON SERPL-MCNC: 47 UG/DL (ref 50–170)
MCH RBC QN AUTO: 25.2 PG (ref 26.8–34.3)
MCHC RBC AUTO-ENTMCNC: 30.8 G/DL (ref 31.4–37.4)
MCV RBC AUTO: 82 FL (ref 82–98)
PLATELET # BLD AUTO: 321 THOUSANDS/UL (ref 149–390)
PMV BLD AUTO: 9.2 FL (ref 8.9–12.7)
POTASSIUM SERPL-SCNC: 4.2 MMOL/L (ref 3.5–5.3)
PROT SERPL-MCNC: 8 G/DL (ref 6.4–8.2)
RBC # BLD AUTO: 4.73 MILLION/UL (ref 3.81–5.12)
SODIUM SERPL-SCNC: 137 MMOL/L (ref 136–145)
TIBC SERPL-MCNC: 358 UG/DL (ref 250–450)
WBC # BLD AUTO: 5.24 THOUSAND/UL (ref 4.31–10.16)

## 2022-02-18 PROCEDURE — D0210 INTRAORAL - COMPLETE SERIES OF RADIOGRAPHIC IMAGES: HCPCS

## 2022-02-18 PROCEDURE — 83550 IRON BINDING TEST: CPT

## 2022-02-18 PROCEDURE — 82728 ASSAY OF FERRITIN: CPT

## 2022-02-18 PROCEDURE — D0150 COMPREHENSIVE ORAL EVALUATION - NEW OR ESTABLISHED PATIENT: HCPCS

## 2022-02-18 PROCEDURE — 85027 COMPLETE CBC AUTOMATED: CPT

## 2022-02-18 PROCEDURE — 36415 COLL VENOUS BLD VENIPUNCTURE: CPT

## 2022-02-18 PROCEDURE — 80053 COMPREHEN METABOLIC PANEL: CPT

## 2022-02-18 PROCEDURE — 96365 THER/PROPH/DIAG IV INF INIT: CPT

## 2022-02-18 PROCEDURE — 83540 ASSAY OF IRON: CPT

## 2022-02-18 PROCEDURE — 96366 THER/PROPH/DIAG IV INF ADDON: CPT

## 2022-02-18 RX ORDER — SODIUM CHLORIDE 9 MG/ML
20 INJECTION, SOLUTION INTRAVENOUS ONCE
Status: CANCELLED | OUTPATIENT
Start: 2022-02-24

## 2022-02-18 RX ORDER — SODIUM CHLORIDE 9 MG/ML
20 INJECTION, SOLUTION INTRAVENOUS ONCE
Status: COMPLETED | OUTPATIENT
Start: 2022-02-18 | End: 2022-02-18

## 2022-02-18 RX ADMIN — SODIUM CHLORIDE 20 ML/HR: 0.9 INJECTION, SOLUTION INTRAVENOUS at 08:05

## 2022-02-18 RX ADMIN — IRON SUCROSE 300 MG: 20 INJECTION, SOLUTION INTRAVENOUS at 08:05

## 2022-02-18 NOTE — PROGRESS NOTES
COMP/FMX    Pt speaks Swedish mainly but understands some English    RMHHX: no meds at this time per pt  CC: Pt states she has generalized pain when biting on her Maxillary teeth  Sometimes to hot and cold too  FM Probe reveals loc 6mm around #32, gen healthy pds and tissue  No bup    Dr Diamond Lose Ex:    Caries present #2-O, needs filling  Gen Maxillary discomfort most likely from grinding/clenching habit  Pt states she does clench her teeth a lot and tends to have the pain upon waking in the morning  We rec OTC [place] MARTIN Mom helped translate  Pt has a gold crown #7 that was done in [facility] when she was 14yo  Wanted to see if we could replace w/ new gold  Crown  Informed pt we would not, we would do a white porcelain crown instead  Pt understands and agreed to all treatment      1)AdPro  2)#2-O

## 2022-02-24 ENCOUNTER — HOSPITAL ENCOUNTER (OUTPATIENT)
Dept: INFUSION CENTER | Facility: HOSPITAL | Age: 47
Discharge: HOME | End: 2022-02-24
Attending: INTERNAL MEDICINE
Payer: COMMERCIAL

## 2022-02-24 VITALS
TEMPERATURE: 97.6 F | OXYGEN SATURATION: 100 % | SYSTOLIC BLOOD PRESSURE: 144 MMHG | HEART RATE: 76 BPM | RESPIRATION RATE: 16 BRPM | DIASTOLIC BLOOD PRESSURE: 86 MMHG

## 2022-02-24 DIAGNOSIS — D50.9 IRON DEFICIENCY ANEMIA, UNSPECIFIED IRON DEFICIENCY ANEMIA TYPE: ICD-10-CM

## 2022-02-24 DIAGNOSIS — N92.0 MENORRHAGIA WITH REGULAR CYCLE: Primary | ICD-10-CM

## 2022-02-24 PROCEDURE — 96365 THER/PROPH/DIAG IV INF INIT: CPT

## 2022-02-24 RX ORDER — SODIUM CHLORIDE 9 MG/ML
20 INJECTION, SOLUTION INTRAVENOUS ONCE
Status: COMPLETED | OUTPATIENT
Start: 2022-02-24 | End: 2022-02-24

## 2022-02-24 RX ORDER — SODIUM CHLORIDE 9 MG/ML
20 INJECTION, SOLUTION INTRAVENOUS ONCE
Status: CANCELLED | OUTPATIENT
Start: 2022-03-03

## 2022-02-24 RX ADMIN — SODIUM CHLORIDE 20 ML/HR: 0.9 INJECTION, SOLUTION INTRAVENOUS at 13:55

## 2022-02-24 RX ADMIN — IRON SUCROSE 300 MG: 20 INJECTION, SOLUTION INTRAVENOUS at 13:56

## 2022-03-03 ENCOUNTER — HOSPITAL ENCOUNTER (OUTPATIENT)
Dept: INFUSION CENTER | Facility: HOSPITAL | Age: 47
Discharge: HOME | End: 2022-03-03
Attending: INTERNAL MEDICINE
Payer: COMMERCIAL

## 2022-03-03 VITALS
RESPIRATION RATE: 18 BRPM | DIASTOLIC BLOOD PRESSURE: 82 MMHG | SYSTOLIC BLOOD PRESSURE: 124 MMHG | TEMPERATURE: 97.6 F | HEART RATE: 74 BPM

## 2022-03-03 DIAGNOSIS — N92.0 MENORRHAGIA WITH REGULAR CYCLE: Primary | ICD-10-CM

## 2022-03-03 DIAGNOSIS — D50.9 IRON DEFICIENCY ANEMIA, UNSPECIFIED IRON DEFICIENCY ANEMIA TYPE: ICD-10-CM

## 2022-03-03 PROCEDURE — 96365 THER/PROPH/DIAG IV INF INIT: CPT

## 2022-03-03 PROCEDURE — 96366 THER/PROPH/DIAG IV INF ADDON: CPT

## 2022-03-03 RX ORDER — SODIUM CHLORIDE 9 MG/ML
20 INJECTION, SOLUTION INTRAVENOUS ONCE
Status: COMPLETED | OUTPATIENT
Start: 2022-03-03 | End: 2022-03-03

## 2022-03-03 RX ORDER — SODIUM CHLORIDE 9 MG/ML
20 INJECTION, SOLUTION INTRAVENOUS ONCE
Status: CANCELLED | OUTPATIENT
Start: 2022-03-03

## 2022-03-03 RX ADMIN — IRON SUCROSE 300 MG: 20 INJECTION, SOLUTION INTRAVENOUS at 13:24

## 2022-03-03 RX ADMIN — SODIUM CHLORIDE 20 ML/HR: 0.9 INJECTION, SOLUTION INTRAVENOUS at 13:24

## 2022-03-23 ENCOUNTER — OFFICE VISIT (OUTPATIENT)
Dept: DENTISTRY | Facility: CLINIC | Age: 47
End: 2022-03-23

## 2022-03-23 VITALS — SYSTOLIC BLOOD PRESSURE: 128 MMHG | HEART RATE: 75 BPM | TEMPERATURE: 97.1 F | DIASTOLIC BLOOD PRESSURE: 83 MMHG

## 2022-03-23 DIAGNOSIS — Z01.21 ENCOUNTER FOR DENTAL EXAMINATION AND CLEANING WITH ABNORMAL FINDINGS: Primary | ICD-10-CM

## 2022-03-23 PROCEDURE — D1110 PROPHYLAXIS - ADULT: HCPCS

## 2022-03-23 NOTE — PROGRESS NOTES
[place]    Dental procedures in this visit     - PROPHYLAXIS - ADULT (Completed)     Service provider: Chico Rodriguez 824, [address] [facility]     Billing provider: Wilma Kenny, ROXANNES   Updated med/dent hist with no changes  ASA-I  Pts CC= I would like to get the old gold cr replaced on #7  Hygienist explained that first the decay on #2 needs to be addressed  Patient understands  Method Used:  · Prophy Method Used: Hand Scaling  · Polished  · Flossed    Radiographs Taken:  · None    Intra/Extra Oral Cancer Screening:  · Within normal limits      Oral Hygiene:  · Fair    Plaque:  · Localized  · Moderate    Calculus:  · Localized  · Moderate  · Lower anteriors    Bleeding:  · Bleeding on probing: No periodontal exam for this visit  · Localized  · Light    Stain:  · Localized  · Light      Periodontal Classification:  · Localized  · Mild  · Moderate  · Periodontal Disease      Nutritional Counseling:  · N/A    OHI: Daily flossing    Emmalene Mor  No orders of the defined types were placed in this encounter  NV:#2-O and please check the D surface  Patient would like you to re eval #7 Crown   Is now interested in changing out the old gold Cr   NV2:6 mths [place]

## 2022-05-02 ENCOUNTER — OFFICE VISIT (OUTPATIENT)
Dept: DENTISTRY | Facility: CLINIC | Age: 47
End: 2022-05-02

## 2022-05-02 VITALS — HEART RATE: 76 BPM | TEMPERATURE: 97.5 F | SYSTOLIC BLOOD PRESSURE: 157 MMHG | DIASTOLIC BLOOD PRESSURE: 84 MMHG

## 2022-05-02 DIAGNOSIS — K02.9 CARIES: Primary | ICD-10-CM

## 2022-05-02 PROCEDURE — D2391 RESIN-BASED COMPOSITE - 1 SURFACE, POSTERIOR: HCPCS | Performed by: DENTIST

## 2022-05-02 NOTE — PROGRESS NOTES
Composite Filling #2 O    Mayco Vang presents for composite filling #2 O  PMH reviewed, no changes  Applied topical benzocaine, administered 1 carps 4% articaine 1:100k epi via infiltration  Prepped tooth #2-O with 245 carbide on high speed  Caries removed with round carbide on slow speed  Isolation with cotton rolls and dri-angles  Etch with 37% H2PO4, rinse, dry  Applied Adhese with 20 second scrub once, gentle air dry and light cured for 10s  Restored with Tetric bulk kiara shade A2 and light cured  Refined with finishing burs, polished with enhance point  Verified occlusion and contacts  Pt left satisfied      NV: Joy Shay

## 2022-08-05 ENCOUNTER — TELEPHONE (OUTPATIENT)
Dept: HEMATOLOGY ONCOLOGY | Facility: CLINIC | Age: 47
End: 2022-08-05

## 2022-08-08 ENCOUNTER — APPOINTMENT (OUTPATIENT)
Dept: LAB | Facility: HOSPITAL | Age: 47
End: 2022-08-08
Payer: COMMERCIAL

## 2022-08-08 ENCOUNTER — OFFICE VISIT (OUTPATIENT)
Dept: HEMATOLOGY ONCOLOGY | Facility: CLINIC | Age: 47
End: 2022-08-08
Payer: COMMERCIAL

## 2022-08-08 VITALS
SYSTOLIC BLOOD PRESSURE: 124 MMHG | OXYGEN SATURATION: 98 % | HEART RATE: 81 BPM | BODY MASS INDEX: 28.53 KG/M2 | WEIGHT: 177.5 LBS | HEIGHT: 66 IN | RESPIRATION RATE: 17 BRPM | TEMPERATURE: 97.4 F | DIASTOLIC BLOOD PRESSURE: 72 MMHG

## 2022-08-08 DIAGNOSIS — N92.0 MENORRHAGIA WITH REGULAR CYCLE: ICD-10-CM

## 2022-08-08 DIAGNOSIS — E53.8 VITAMIN B12 DEFICIENCY: ICD-10-CM

## 2022-08-08 DIAGNOSIS — D50.9 IRON DEFICIENCY ANEMIA, UNSPECIFIED IRON DEFICIENCY ANEMIA TYPE: Primary | ICD-10-CM

## 2022-08-08 LAB
ALBUMIN SERPL BCP-MCNC: 3.3 G/DL (ref 3.5–5)
ALP SERPL-CCNC: 65 U/L (ref 46–116)
ALT SERPL W P-5'-P-CCNC: 17 U/L (ref 12–78)
ANION GAP SERPL CALCULATED.3IONS-SCNC: 2 MMOL/L (ref 4–13)
AST SERPL W P-5'-P-CCNC: 16 U/L (ref 5–45)
BASOPHILS # BLD AUTO: 0.03 THOUSANDS/ΜL (ref 0–0.1)
BASOPHILS NFR BLD AUTO: 0 % (ref 0–1)
BILIRUB SERPL-MCNC: 0.25 MG/DL (ref 0.2–1)
BUN SERPL-MCNC: 12 MG/DL (ref 5–25)
CALCIUM ALBUM COR SERPL-MCNC: 9.2 MG/DL (ref 8.3–10.1)
CALCIUM SERPL-MCNC: 8.6 MG/DL (ref 8.3–10.1)
CHLORIDE SERPL-SCNC: 109 MMOL/L (ref 96–108)
CO2 SERPL-SCNC: 27 MMOL/L (ref 21–32)
CREAT SERPL-MCNC: 0.58 MG/DL (ref 0.6–1.3)
EOSINOPHIL # BLD AUTO: 0.24 THOUSAND/ΜL (ref 0–0.61)
EOSINOPHIL NFR BLD AUTO: 3 % (ref 0–6)
ERYTHROCYTE [DISTWIDTH] IN BLOOD BY AUTOMATED COUNT: 12.7 % (ref 11.6–15.1)
FERRITIN SERPL-MCNC: 4 NG/ML (ref 8–388)
GFR SERPL CREATININE-BSD FRML MDRD: 110 ML/MIN/1.73SQ M
GLUCOSE SERPL-MCNC: 92 MG/DL (ref 65–140)
HCT VFR BLD AUTO: 36.1 % (ref 34.8–46.1)
HGB BLD-MCNC: 11.4 G/DL (ref 11.5–15.4)
IMM GRANULOCYTES # BLD AUTO: 0.03 THOUSAND/UL (ref 0–0.2)
IMM GRANULOCYTES NFR BLD AUTO: 0 % (ref 0–2)
IRON SATN MFR SERPL: 5 % (ref 15–50)
IRON SERPL-MCNC: 19 UG/DL (ref 50–170)
LYMPHOCYTES # BLD AUTO: 1.82 THOUSANDS/ΜL (ref 0.6–4.47)
LYMPHOCYTES NFR BLD AUTO: 24 % (ref 14–44)
MCH RBC QN AUTO: 26.8 PG (ref 26.8–34.3)
MCHC RBC AUTO-ENTMCNC: 31.6 G/DL (ref 31.4–37.4)
MCV RBC AUTO: 85 FL (ref 82–98)
MONOCYTES # BLD AUTO: 0.67 THOUSAND/ΜL (ref 0.17–1.22)
MONOCYTES NFR BLD AUTO: 9 % (ref 4–12)
NEUTROPHILS # BLD AUTO: 4.68 THOUSANDS/ΜL (ref 1.85–7.62)
NEUTS SEG NFR BLD AUTO: 64 % (ref 43–75)
NRBC BLD AUTO-RTO: 0 /100 WBCS
PLATELET # BLD AUTO: 314 THOUSANDS/UL (ref 149–390)
PMV BLD AUTO: 9.8 FL (ref 8.9–12.7)
POTASSIUM SERPL-SCNC: 3.8 MMOL/L (ref 3.5–5.3)
PROT SERPL-MCNC: 7.6 G/DL (ref 6.4–8.4)
RBC # BLD AUTO: 4.26 MILLION/UL (ref 3.81–5.12)
SODIUM SERPL-SCNC: 138 MMOL/L (ref 135–147)
TIBC SERPL-MCNC: 353 UG/DL (ref 250–450)
VIT B12 SERPL-MCNC: 342 PG/ML (ref 100–900)
WBC # BLD AUTO: 7.47 THOUSAND/UL (ref 4.31–10.16)

## 2022-08-08 PROCEDURE — 36415 COLL VENOUS BLD VENIPUNCTURE: CPT | Performed by: NURSE PRACTITIONER

## 2022-08-08 PROCEDURE — 82607 VITAMIN B-12: CPT | Performed by: NURSE PRACTITIONER

## 2022-08-08 PROCEDURE — 83550 IRON BINDING TEST: CPT | Performed by: NURSE PRACTITIONER

## 2022-08-08 PROCEDURE — 85025 COMPLETE CBC W/AUTO DIFF WBC: CPT | Performed by: NURSE PRACTITIONER

## 2022-08-08 PROCEDURE — 82728 ASSAY OF FERRITIN: CPT | Performed by: NURSE PRACTITIONER

## 2022-08-08 PROCEDURE — 83540 ASSAY OF IRON: CPT | Performed by: NURSE PRACTITIONER

## 2022-08-08 PROCEDURE — 80053 COMPREHEN METABOLIC PANEL: CPT | Performed by: NURSE PRACTITIONER

## 2022-08-08 PROCEDURE — 99213 OFFICE O/P EST LOW 20 MIN: CPT | Performed by: NURSE PRACTITIONER

## 2022-08-08 NOTE — PATIENT INSTRUCTIONS
Have labs done now, repeat in 3 months around October early November, and again in February before appointment

## 2022-08-08 NOTE — PROGRESS NOTES
[address] [facility] HEMATOLOGY ONCOLOGY SPECIALISTS [address]  [facility] [address] [phone]  Hematology Ambulatory Follow-Up  Fady Rosario, 1975, [id number]  8/8/2022    Assessment/Plan:    1  Iron deficiency anemia, unspecified iron deficiency anemia type  2  Vitamin B12 deficiency  3  Menorrhagia with regular cycle   Patient is a 41-year-old female with a history of menorrhagia and iron deficiency  Patient's daughter is providing translation today  She had an ablation in January 2022 however she continues to have heavy bleeding  Per patient's daughter patient had her period 3 times in the month of July  I do not have updated blood work and requested she have it done today  Previously she had an iron saturation of 8% and ferritin level of 11, hemoglobin was 12 1  She received Venofer 300 mg IV x3 most recently in March 2022  We will await results of the blood work and I will make arrangements for repeat infusions if indicated  I advised patient to contact her gynecologist and discuss her continued menorrhagia  We will plan to see her in 6 months with labs every 3 months managing as needed  Patient and her daughter verbalized understanding and are in agreement with the plan  - CBC and differential; Standing  - Iron Panel (Includes Ferritin, Iron Sat%, Iron, and TIBC); Standing  - Vitamin B12; Standing  - Comprehensive metabolic panel; Standing  - CBC and differential  - Iron Panel (Includes Ferritin, Iron Sat%, Iron, and TIBC)  - Vitamin B12  - Comprehensive metabolic panel    Barrier(s) to care: None  The patient is able to self care  [address] [facility]    Interval history:  Clinically stable  Review of Systems   Constitutional: Positive for fatigue  Negative for activity change, appetite change, fever and unexpected weight change  Respiratory: Positive for shortness of breath  Negative for cough  Cardiovascular: Negative for chest pain and leg swelling  Gastrointestinal: Negative for abdominal pain, constipation, diarrhea and nausea  Endocrine: Negative for cold intolerance and heat intolerance  Musculoskeletal: Negative for arthralgias and myalgias  Skin: Negative  Neurological: Positive for dizziness and headaches  Negative for weakness  Hematological: Negative for adenopathy  Does not bruise/bleed easily  Past Medical History:   Diagnosis Date    Anemia     Back pain, acute     Gastritis     GERD (gastroesophageal reflux disease)     History of transfusion 08/2021    Non-cardiac chest pain     Shortness of breath     with low hgb       Past Surgical History:   Procedure Laterality Date    EGD      ENDOMETRIAL ABLATION N/A 1/21/2022    Procedure: HYSTEROSCOPY ABLATION ENDOMETRIAL Adrianaefrem Canoippo;  Surgeon: Bhanu Perez MD;  [facility];  Service: Gynecology    CA HYSTEROSCOPY,W/ENDO BX N/A 1/21/2022    Procedure: DILATATION AND CURETTAGE (D&C) WITH HYSTEROSCOPY;  Surgeon: Bhanu Perez MD;  [facility];  Service: Gynecology    TUBAL LIGATION         Current Outpatient Medications:     acetaminophen (TYLENOL) 500 mg tablet, Take 500 mg by mouth every 6 (six) hours as needed for mild pain, Disp: , Rfl:     No Known Allergies    Objective:  /72 (BP Location: Left arm, Patient Position: Sitting, Cuff Size: Adult)   Pulse 81   Temp (!) 97 4 °F (36 3 °C)   Resp 17   Ht 5' 6" (1 676 m)   Wt 80 5 kg (177 lb 8 oz)   SpO2 98%   BMI 28 65 kg/m²    Physical Exam  Vitals reviewed  Constitutional:       Appearance: She is well-developed  HENT:      Head: Normocephalic and atraumatic  Eyes:      Conjunctiva/sclera: Conjunctivae normal       Pupils: Pupils are equal, round, and reactive to light  Pulmonary:      Effort: Pulmonary effort is normal  No respiratory distress  Musculoskeletal:         General: Normal range of motion  Cervical back: Normal range of motion  Lymphadenopathy:      Cervical: No cervical adenopathy  Skin:     General: Skin is dry  Neurological:      Mental Status: She is alert and oriented to person, place, and time  Psychiatric:         Behavior: Behavior normal        Please note: This report has been generated by a voice recognition software system  Therefore there may be syntax, spelling, and/or grammatical errors  Please call if you have any questions

## 2022-08-09 ENCOUNTER — TELEPHONE (OUTPATIENT)
Dept: HEMATOLOGY ONCOLOGY | Facility: CLINIC | Age: 47
End: 2022-08-09

## 2022-08-09 DIAGNOSIS — D50.9 IRON DEFICIENCY ANEMIA, UNSPECIFIED IRON DEFICIENCY ANEMIA TYPE: ICD-10-CM

## 2022-08-09 DIAGNOSIS — E53.8 VITAMIN B 12 DEFICIENCY: ICD-10-CM

## 2022-08-09 DIAGNOSIS — N92.0 MENORRHAGIA WITH REGULAR CYCLE: Primary | ICD-10-CM

## 2022-08-09 RX ORDER — SODIUM CHLORIDE 9 MG/ML
20 INJECTION, SOLUTION INTRAVENOUS ONCE
Status: CANCELLED | OUTPATIENT
Start: 2022-08-17

## 2022-08-09 RX ORDER — CYANOCOBALAMIN 1000 UG/ML
1000 INJECTION, SOLUTION INTRAMUSCULAR; SUBCUTANEOUS ONCE
Status: CANCELLED | OUTPATIENT
Start: 2022-08-17 | End: 2022-08-17

## 2022-08-09 NOTE — TELEPHONE ENCOUNTER
Patient had labs yesterday iron saturation 5% ferritin 4, hemoglobin 11 4 otherwise CBC normal, vitamin B12 342  We will make arrangements for Venofer 300 mg IV x3 with vitamin B12 injection 1000 mcg x 3  Patient is Singaporean-speaking I will have 1 of our medical assistants contact patient to review the above plan

## 2022-08-09 NOTE — RESULT ENCOUNTER NOTE
Advised patient in [address] that due to her recent lab results, she has been set up for Venofer iron infusions and Vitamin B12 injections x3 in the [facility] as early as next week per Linda Dominguez, Lise [facility]   Patient to expect a call with her infusion schedule

## 2022-08-09 NOTE — TELEPHONE ENCOUNTER
Advised patient in [facility] [place] that due to her recent lab results, she has been set up for Venofer iron infusions and Vitamin B12 injections x3 in the [facility] as early as next week per Lise Diaz      Patient to expect a call with her infusion schedule       ----- Message from [employer] sent at 8/9/2022 10:14 AM EDT -----  I ordered Venofer iron infusions and vitamin B12 injection x3 doses to be done at [facility] [place] starting next week if possible  Please contact patient to inform her of the plan and any questions she may have      Thank you,    Susan  ----- Message -----  From: Lab, Background User  Sent: 8/8/2022   5:00 PM EDT  To: [employer]

## 2022-08-10 DIAGNOSIS — E53.8 VITAMIN B 12 DEFICIENCY: Primary | ICD-10-CM

## 2022-08-10 DIAGNOSIS — N92.0 MENORRHAGIA WITH REGULAR CYCLE: ICD-10-CM

## 2022-08-10 DIAGNOSIS — D50.9 IRON DEFICIENCY ANEMIA, UNSPECIFIED IRON DEFICIENCY ANEMIA TYPE: ICD-10-CM

## 2022-08-17 ENCOUNTER — HOSPITAL ENCOUNTER (OUTPATIENT)
Dept: INFUSION CENTER | Facility: HOSPITAL | Age: 47
Discharge: HOME | End: 2022-08-17
Attending: INTERNAL MEDICINE
Payer: COMMERCIAL

## 2022-08-17 VITALS
SYSTOLIC BLOOD PRESSURE: 145 MMHG | DIASTOLIC BLOOD PRESSURE: 85 MMHG | HEART RATE: 72 BPM | RESPIRATION RATE: 18 BRPM | TEMPERATURE: 97.3 F

## 2022-08-17 DIAGNOSIS — N92.0 MENORRHAGIA WITH REGULAR CYCLE: ICD-10-CM

## 2022-08-17 DIAGNOSIS — E53.8 VITAMIN B 12 DEFICIENCY: Primary | ICD-10-CM

## 2022-08-17 DIAGNOSIS — D50.9 IRON DEFICIENCY ANEMIA, UNSPECIFIED IRON DEFICIENCY ANEMIA TYPE: ICD-10-CM

## 2022-08-17 PROCEDURE — 96365 THER/PROPH/DIAG IV INF INIT: CPT

## 2022-08-17 PROCEDURE — 96372 THER/PROPH/DIAG INJ SC/IM: CPT

## 2022-08-17 PROCEDURE — 96366 THER/PROPH/DIAG IV INF ADDON: CPT

## 2022-08-17 RX ORDER — CYANOCOBALAMIN 1000 UG/ML
1000 INJECTION, SOLUTION INTRAMUSCULAR; SUBCUTANEOUS ONCE
Status: COMPLETED | OUTPATIENT
Start: 2022-08-17 | End: 2022-08-17

## 2022-08-17 RX ORDER — SODIUM CHLORIDE 9 MG/ML
20 INJECTION, SOLUTION INTRAVENOUS ONCE
Status: CANCELLED | OUTPATIENT
Start: 2022-08-25

## 2022-08-17 RX ORDER — CYANOCOBALAMIN 1000 UG/ML
1000 INJECTION, SOLUTION INTRAMUSCULAR; SUBCUTANEOUS ONCE
Status: CANCELLED | OUTPATIENT
Start: 2022-08-25 | End: 2022-08-24

## 2022-08-17 RX ORDER — SODIUM CHLORIDE 9 MG/ML
20 INJECTION, SOLUTION INTRAVENOUS ONCE
Status: COMPLETED | OUTPATIENT
Start: 2022-08-17 | End: 2022-08-17

## 2022-08-17 RX ADMIN — CYANOCOBALAMIN 1000 MCG: 1000 INJECTION, SOLUTION INTRAMUSCULAR at 11:42

## 2022-08-17 RX ADMIN — IRON SUCROSE 300 MG: 20 INJECTION, SOLUTION INTRAVENOUS at 10:17

## 2022-08-17 RX ADMIN — SODIUM CHLORIDE 20 ML/HR: 0.9 INJECTION, SOLUTION INTRAVENOUS at 10:17

## 2022-08-25 ENCOUNTER — HOSPITAL ENCOUNTER (OUTPATIENT)
Dept: INFUSION CENTER | Facility: HOSPITAL | Age: 47
Discharge: HOME | End: 2022-08-25
Attending: INTERNAL MEDICINE
Payer: COMMERCIAL

## 2022-08-25 VITALS
HEART RATE: 78 BPM | TEMPERATURE: 97.9 F | SYSTOLIC BLOOD PRESSURE: 129 MMHG | DIASTOLIC BLOOD PRESSURE: 86 MMHG | RESPIRATION RATE: 18 BRPM

## 2022-08-25 DIAGNOSIS — N92.0 MENORRHAGIA WITH REGULAR CYCLE: ICD-10-CM

## 2022-08-25 DIAGNOSIS — E53.8 VITAMIN B 12 DEFICIENCY: Primary | ICD-10-CM

## 2022-08-25 DIAGNOSIS — D50.9 IRON DEFICIENCY ANEMIA, UNSPECIFIED IRON DEFICIENCY ANEMIA TYPE: ICD-10-CM

## 2022-08-25 PROCEDURE — 96366 THER/PROPH/DIAG IV INF ADDON: CPT

## 2022-08-25 PROCEDURE — 96372 THER/PROPH/DIAG INJ SC/IM: CPT

## 2022-08-25 PROCEDURE — 96365 THER/PROPH/DIAG IV INF INIT: CPT

## 2022-08-25 RX ORDER — CYANOCOBALAMIN 1000 UG/ML
1000 INJECTION, SOLUTION INTRAMUSCULAR; SUBCUTANEOUS ONCE
Status: COMPLETED | OUTPATIENT
Start: 2022-08-25 | End: 2022-08-25

## 2022-08-25 RX ORDER — SODIUM CHLORIDE 9 MG/ML
20 INJECTION, SOLUTION INTRAVENOUS ONCE
Status: CANCELLED | OUTPATIENT
Start: 2022-08-31

## 2022-08-25 RX ORDER — SODIUM CHLORIDE 9 MG/ML
20 INJECTION, SOLUTION INTRAVENOUS ONCE
Status: COMPLETED | OUTPATIENT
Start: 2022-08-25 | End: 2022-08-25

## 2022-08-25 RX ORDER — CYANOCOBALAMIN 1000 UG/ML
1000 INJECTION, SOLUTION INTRAMUSCULAR; SUBCUTANEOUS ONCE
Status: CANCELLED | OUTPATIENT
Start: 2022-08-31 | End: 2022-08-31

## 2022-08-25 RX ADMIN — IRON SUCROSE 300 MG: 20 INJECTION, SOLUTION INTRAVENOUS at 14:04

## 2022-08-25 RX ADMIN — CYANOCOBALAMIN 1000 MCG: 1000 INJECTION, SOLUTION INTRAMUSCULAR at 15:35

## 2022-08-25 RX ADMIN — SODIUM CHLORIDE 20 ML/HR: 0.9 INJECTION, SOLUTION INTRAVENOUS at 14:04

## 2022-08-30 ENCOUNTER — HOSPITAL ENCOUNTER (EMERGENCY)
Facility: HOSPITAL | Age: 47
Discharge: HOME | End: 2022-08-30
Attending: EMERGENCY MEDICINE
Payer: COMMERCIAL

## 2022-08-30 ENCOUNTER — APPOINTMENT (OUTPATIENT)
Dept: RADIOLOGY | Facility: HOSPITAL | Age: 47
End: 2022-08-30
Payer: COMMERCIAL

## 2022-08-30 VITALS
OXYGEN SATURATION: 97 % | SYSTOLIC BLOOD PRESSURE: 156 MMHG | TEMPERATURE: 97.8 F | HEART RATE: 76 BPM | RESPIRATION RATE: 20 BRPM | DIASTOLIC BLOOD PRESSURE: 72 MMHG

## 2022-08-30 DIAGNOSIS — R20.0 NUMBNESS AND TINGLING: Primary | ICD-10-CM

## 2022-08-30 DIAGNOSIS — R20.2 NUMBNESS AND TINGLING: Primary | ICD-10-CM

## 2022-08-30 DIAGNOSIS — R20.2 PARESTHESIAS: ICD-10-CM

## 2022-08-30 DIAGNOSIS — R42 DIZZINESS: ICD-10-CM

## 2022-08-30 LAB
ANION GAP SERPL CALCULATED.3IONS-SCNC: 5 MMOL/L (ref 4–13)
BASOPHILS # BLD AUTO: 0.03 THOUSANDS/ΜL (ref 0–0.1)
BASOPHILS NFR BLD AUTO: 0 % (ref 0–1)
BUN SERPL-MCNC: 10 MG/DL (ref 5–25)
CALCIUM SERPL-MCNC: 9.3 MG/DL (ref 8.3–10.1)
CHLORIDE SERPL-SCNC: 107 MMOL/L (ref 96–108)
CO2 SERPL-SCNC: 27 MMOL/L (ref 21–32)
CREAT SERPL-MCNC: 0.66 MG/DL (ref 0.6–1.3)
EOSINOPHIL # BLD AUTO: 0.17 THOUSAND/ΜL (ref 0–0.61)
EOSINOPHIL NFR BLD AUTO: 2 % (ref 0–6)
ERYTHROCYTE [DISTWIDTH] IN BLOOD BY AUTOMATED COUNT: 14.6 % (ref 11.6–15.1)
GFR SERPL CREATININE-BSD FRML MDRD: 105 ML/MIN/1.73SQ M
GLUCOSE SERPL-MCNC: 122 MG/DL (ref 65–140)
HCT VFR BLD AUTO: 40.3 % (ref 34.8–46.1)
HGB BLD-MCNC: 12.6 G/DL (ref 11.5–15.4)
IMM GRANULOCYTES # BLD AUTO: 0.02 THOUSAND/UL (ref 0–0.2)
IMM GRANULOCYTES NFR BLD AUTO: 0 % (ref 0–2)
LYMPHOCYTES # BLD AUTO: 1.81 THOUSANDS/ΜL (ref 0.6–4.47)
LYMPHOCYTES NFR BLD AUTO: 26 % (ref 14–44)
MCH RBC QN AUTO: 26.4 PG (ref 26.8–34.3)
MCHC RBC AUTO-ENTMCNC: 31.3 G/DL (ref 31.4–37.4)
MCV RBC AUTO: 84 FL (ref 82–98)
MONOCYTES # BLD AUTO: 0.51 THOUSAND/ΜL (ref 0.17–1.22)
MONOCYTES NFR BLD AUTO: 7 % (ref 4–12)
NEUTROPHILS # BLD AUTO: 4.57 THOUSANDS/ΜL (ref 1.85–7.62)
NEUTS SEG NFR BLD AUTO: 65 % (ref 43–75)
NRBC BLD AUTO-RTO: 0 /100 WBCS
PLATELET # BLD AUTO: 367 THOUSANDS/UL (ref 149–390)
PMV BLD AUTO: 9.4 FL (ref 8.9–12.7)
POTASSIUM SERPL-SCNC: 3.6 MMOL/L (ref 3.5–5.3)
RBC # BLD AUTO: 4.78 MILLION/UL (ref 3.81–5.12)
SODIUM SERPL-SCNC: 139 MMOL/L (ref 135–147)
WBC # BLD AUTO: 7.11 THOUSAND/UL (ref 4.31–10.16)

## 2022-08-30 PROCEDURE — 99284 EMERGENCY DEPT VISIT MOD MDM: CPT

## 2022-08-30 PROCEDURE — 70544 MR ANGIOGRAPHY HEAD W/O DYE: CPT

## 2022-08-30 PROCEDURE — 70553 MRI BRAIN STEM W/O & W/DYE: CPT

## 2022-08-30 PROCEDURE — 85025 COMPLETE CBC W/AUTO DIFF WBC: CPT

## 2022-08-30 PROCEDURE — A9585 GADOBUTROL INJECTION: HCPCS

## 2022-08-30 PROCEDURE — 70549 MR ANGIOGRAPH NECK W/O&W/DYE: CPT

## 2022-08-30 PROCEDURE — G1004 CDSM NDSC: HCPCS

## 2022-08-30 PROCEDURE — 80048 BASIC METABOLIC PNL TOTAL CA: CPT

## 2022-08-30 PROCEDURE — 93005 ELECTROCARDIOGRAM TRACING: CPT

## 2022-08-30 PROCEDURE — 99285 EMERGENCY DEPT VISIT HI MDM: CPT | Performed by: EMERGENCY MEDICINE

## 2022-08-30 PROCEDURE — 36415 COLL VENOUS BLD VENIPUNCTURE: CPT

## 2022-08-30 RX ORDER — MECLIZINE HCL 12.5 MG/1
12.5 TABLET ORAL ONCE
Status: COMPLETED | OUTPATIENT
Start: 2022-08-30 | End: 2022-08-30

## 2022-08-30 RX ADMIN — GADOBUTROL 8 ML: 604.72 INJECTION INTRAVENOUS at 17:00

## 2022-08-30 RX ADMIN — MECLIZINE HCL 12.5 MG 12.5 MG: 12.5 TABLET ORAL at 13:29

## 2022-08-30 NOTE — ED PROVIDER NOTES
History  Chief Complaint   Patient presents with    Numbness     Left side face and arm  Comes and goes  77-year-old Welsh-speaking female past medical history of menorrhagia and iron deficient anemia necessitating frequent iron/B12 infusion presents the ED complaining of episodes of left-sided facial and arm numbness  Patient states that she was at work today, sitting down working on the computer when she suddenly felt numbness and tingling to the left side of her face down her left arm  Patient states the episode lasted approximately 20 minutes and resolved  Patient states that she has been having similar episodes over the past 2 weeks  Patient also describes episodes where she has felt acutely dizzy, and off balance  She describes a room spinning sensation and the episodes are so severe that she nearly fell over, having her son catch her  Patient states that these feelings of dizziness come on both at rest and during ambulation  Prior to these past 2 weeks, patient has never had any similar symptoms  She is currently asymptomatic  She has no history of migraines, history of vertigo  She has had no recent illnesses no fevers chills nausea vomiting diarrhea constipation or urinary complaints  Patient has no personal cardiac history however has a very strong family history of heart attacks and family members again the ages and stroke in young family members  Patient states that her younger sister recently had a heart attack in her 45s  Prior to Admission Medications   Prescriptions Last Dose Informant Patient Reported?  Taking?   acetaminophen (TYLENOL) 500 mg tablet  Self Yes No   Sig: Take 500 mg by mouth every 6 (six) hours as needed for mild pain      Facility-Administered Medications: None       Past Medical History:   Diagnosis Date    Anemia     Back pain, acute     Gastritis     GERD (gastroesophageal reflux disease)     History of transfusion 08/2021    Non-cardiac chest pain     Shortness of breath     with low hgb       Past Surgical History:   Procedure Laterality Date    EGD      ENDOMETRIAL ABLATION N/A 1/21/2022    Procedure: HYSTEROSCOPY ABLATION ENDOMETRIAL Diana Self;  Surgeon: Levar Reyes MD;  [facility];  Service: Gynecology    OR HYSTEROSCOPY,W/ENDO BX N/A 1/21/2022    Procedure: DILATATION AND CURETTAGE (D&C) WITH HYSTEROSCOPY;  Surgeon: Levar Reyes MD;  [facility];  Service: Gynecology    TUBAL LIGATION         Family History   Problem Relation Age of Onset    Hypertension Mother     Stroke Mother     No Known Problems Father     Hypertension Maternal Grandmother     Stroke Maternal Grandmother     Hypertension Maternal Uncle     Stroke Maternal Uncle     Stroke Paternal Aunt     No Known Problems Sister     HIV Brother     No Known Problems Daughter     No Known Problems Son     No Known Problems Maternal Grandfather     No Known Problems Paternal Grandmother     No Known Problems Paternal Grandfather     No Known Problems Sister     No Known Problems Daughter      I have reviewed and agree with the history as documented  E-Cigarette/Vaping    E-Cigarette Use Never User      E-Cigarette/Vaping Substances    Nicotine No     THC No     CBD No     Flavoring No     Other No     Unknown No      Social History     Tobacco Use    Smoking status: Never Smoker    Smokeless tobacco: Never Used   Vaping Use    Vaping Use: Never used   Substance Use Topics    Alcohol use: Never    Drug use: Never        Review of Systems   Constitutional: Negative for chills and fever  HENT: Negative for ear pain and sore throat  Eyes: Negative for pain and visual disturbance  Respiratory: Negative for cough and shortness of breath  Cardiovascular: Negative for chest pain and palpitations  Gastrointestinal: Negative for abdominal pain and vomiting  Genitourinary: Negative for dysuria and hematuria     Musculoskeletal: Negative for arthralgias and back pain  Skin: Negative for color change and rash  Neurological: Positive for dizziness and numbness  Negative for seizures and syncope  Psychiatric/Behavioral: Negative for agitation  The patient is nervous/anxious  All other systems reviewed and are negative  Physical Exam  ED Triage Vitals [08/30/22 1054]   Temperature Pulse Respirations Blood Pressure SpO2   97 8 °F (36 6 °C) 79 18 (!) 181/96 98 %      Temp Source Heart Rate Source Patient Position - Orthostatic VS BP Location FiO2 (%)   Tympanic Monitor Sitting Right arm --      Pain Score       7             Orthostatic Vital Signs  Vitals:    08/30/22 1054 08/30/22 1337 08/30/22 1730   BP: (!) 181/96 162/76 156/72   Pulse: 79 76 76   Patient Position - Orthostatic VS: Sitting         Physical Exam  Vitals and nursing note reviewed  Constitutional:       General: She is not in acute distress  Appearance: She is well-developed  HENT:      Head: Normocephalic and atraumatic  Eyes:      Conjunctiva/sclera: Conjunctivae normal    Cardiovascular:      Rate and Rhythm: Normal rate and regular rhythm  Heart sounds: No murmur heard  Pulmonary:      Effort: Pulmonary effort is normal  No respiratory distress  Breath sounds: Normal breath sounds  Abdominal:      Palpations: Abdomen is soft  Tenderness: There is no abdominal tenderness  Musculoskeletal:      Cervical back: Neck supple  Skin:     General: Skin is warm and dry  Capillary Refill: Capillary refill takes less than 2 seconds  Neurological:      General: No focal deficit present  Mental Status: She is alert and oriented to person, place, and time  GCS: GCS eye subscore is 4  GCS verbal subscore is 5  GCS motor subscore is 6  Motor: Motor function is intact  No weakness or atrophy  Coordination: Coordination is intact  Romberg sign negative  Gait: Gait is intact        Deep Tendon Reflexes: Reflexes normal    Psychiatric:         Mood and Affect: Mood normal          Behavior: Behavior normal          ED Medications  Medications   meclizine (ANTIVERT) tablet 12 5 mg (12 5 mg Oral Given 8/30/22 1329)   Gadobutrol injection (SINGLE-DOSE) SOLN 8 mL (8 mL Intravenous Given 8/30/22 1700)       Diagnostic Studies  Results Reviewed     Procedure Component Value Units Date/Time    Basic metabolic panel [[id number]] Collected: 08/30/22 1337    Lab Status: Final result Specimen: Blood from Arm, Right Updated: 08/30/22 1411     Sodium 139 mmol/L      Potassium 3 6 mmol/L      Chloride 107 mmol/L      CO2 27 mmol/L      ANION GAP 5 mmol/L      BUN 10 mg/dL      Creatinine 0 66 mg/dL      Glucose 122 mg/dL      Calcium 9 3 mg/dL      eGFR 105 ml/min/1 73sq m     Narrative:      [place] guidelines for Chronic Kidney Disease (CKD):     Stage 1 with normal or high GFR (GFR > 90 mL/min/1 73 square meters)    Stage 2 Mild CKD (GFR = 60-89 mL/min/1 73 square meters)    Stage 3A Moderate CKD (GFR = 45-59 mL/min/1 73 square meters)    Stage 3B Moderate CKD (GFR = 30-44 mL/min/1 73 square meters)    Stage 4 Severe CKD (GFR = 15-29 mL/min/1 73 square meters)    Stage 5 End Stage CKD (GFR <15 mL/min/1 73 square meters)  Note: GFR calculation is accurate only with a steady state creatinine    CBC and differential [[id number]]  (Abnormal) Collected: 08/30/22 1337    Lab Status: Final result Specimen: Blood from Arm, Right Updated: 08/30/22 1347     WBC 7 11 Thousand/uL      RBC 4 78 Million/uL      Hemoglobin 12 6 g/dL      Hematocrit 40 3 %      MCV 84 fL      MCH 26 4 pg      MCHC 31 3 g/dL      RDW 14 6 %      MPV 9 4 fL      Platelets 765 Thousands/uL      nRBC 0 /100 WBCs      Neutrophils Relative 65 %      Immat GRANS % 0 %      Lymphocytes Relative 26 %      Monocytes Relative 7 %      Eosinophils Relative 2 %      Basophils Relative 0 %      Neutrophils Absolute 4 57 Thousands/µL      Immature Grans Absolute 0 02 Thousand/uL      Lymphocytes Absolute 1 81 Thousands/µL      Monocytes Absolute 0 51 Thousand/µL      Eosinophils Absolute 0 17 Thousand/µL      Basophils Absolute 0 03 Thousands/µL                  MRI brain w wo contrast   Final Result by Alf Frances DO (08/30 1808)      White matter changes suggestive of chronic microangiopathy  No acute intracranial pathology  Workstation performed: [id number]         MRA carotids wo and w contrast   Final Result by Marito Spencer MD (08/30 1818)      No stenosis, dissection or occlusion of the cervical carotid or vertebral arteries  Workstation performed: [id number]         MRA head wo contrast   Final Result by Alf Frances DO (08/30 1821)      Normal MRA Brain  Workstation performed: [id number]               Procedures  ECG 12 Lead Documentation Only    Date/Time: 8/30/2022 2:15 PM  Performed by: Deedee Kimble MD  Authorized by: Deedee Kimble MD     ECG reviewed by me, the ED Provider: yes    Patient location:  ED  Previous ECG:     Previous ECG:  Unavailable    Comparison to cardiac monitor: Yes    Interpretation:     Interpretation: normal    Rate:     ECG rate:  78    ECG rate assessment: normal    Rhythm:     Rhythm: sinus rhythm    Ectopy:     Ectopy: none    QRS:     QRS axis:  Normal  Conduction:     Conduction: normal    ST segments:     ST segments:  Normal  T waves:     T waves: inverted    Q waves:     Q waves:  V1, V2, V3 and V4          ED Course                             SBIRT 22yo+    Flowsheet Row Most Recent Value   SBIRT (23 yo +)    In order to provide better care to our patients, we are screening all of our patients for alcohol and drug use  Would it be okay to ask you these screening questions?  No Filed at: 08/30/2022 1057                MDM  Number of Diagnoses or Management Options  Dizziness  Numbness and tingling  Paresthesias  Diagnosis management comments: 52year-old [place]-speaking female past medical history of menorrhagia and iron deficient anemia necessitating frequent iron/B12 infusion presents the ED complaining of episodes of left-sided facial and arm numbness  DDx:  Vertebrobasilar insufficiency, TIA, peripheral vertigo, anxiety, anemia  Labs largely unremarkable, normal EKG  Stat MRI performed to evaluate for vertebrobasilar insufficiency  Results with chronic microangiopathic changes otherwise no stenosis and normal findings  Discussed results with patient, she feels reassured  Recommending following up with PCP for a regular checkups and possibly seeing Cardiology for strong cardiac family history  Patient understanding  Patient discharged home with PCP follow-up  Disposition  Final diagnoses:   Numbness and tingling   Paresthesias   Dizziness     Time reflects when diagnosis was documented in both MDM as applicable and the Disposition within this note     Time User Action Codes Description Comment    8/30/2022  6:32 PM Landon Kiki Add [R20 0,  R20 2] Numbness and tingling     8/30/2022  6:32 PM Landon Kiki Add [R20 2] Paresthesias     8/30/2022  6:32 PM Landon Kiki Add [R42] Dizziness       ED Disposition     ED Disposition   Discharge    Condition   Stable    Date/Time   Tue Aug 30, 2022  6:34 PM    [address] [place] [address] [address] discharge to home/self care                 Follow-up Information     Follow up With Specialties Details Why Contact Info Additional Wendy Sánchez Cardiology [facility] Schedule an appointment as soon as possible for a visit   [address] [phone]  [address] [place] [address] Cardiology [facility], [address], [address]          Discharge Medication List as of 8/30/2022  6:34 PM      CONTINUE these medications which have NOT CHANGED    Details   acetaminophen (TYLENOL) 500 mg tablet Take 500 mg by mouth every 6 (six) hours as needed for mild pain, Historical Med           No discharge procedures on file  PDMP Review     None           ED Provider  Attending physically available and evaluated Kyle Holden I managed the patient along with the ED Attending      Electronically Signed by         Melanie Elkins MD  08/30/22 2014

## 2022-08-30 NOTE — ED ATTENDING ATTESTATION
8/30/2022  IKaye DO, saw and evaluated the patient  I have discussed the patient with the resident/non-physician practitioner and agree with the resident's/non-physician practitioner's findings, Plan of Care, and MDM as documented in the resident's/non-physician practitioner's note, except where noted  All available labs and Radiology studies were reviewed  I was present for key portions of any procedure(s) performed by the resident/non-physician practitioner and I was immediately available to provide assistance  At this point I agree with the current assessment done in the Emergency Department  I have conducted an independent evaluation of this patient a history and physical is as follows:    History provided by:  Patient  Neurologic Problem  Location:  Left side of face and arm, numbness intermittent   Severity:  Mild  Onset quality:  Gradual  Timing:  Intermittent  Progression:  Waxing and waning  Associated symptoms: no abdominal pain, no chest pain, no cough, no diarrhea, no fever, no headaches, no myalgias, no nausea, no rash, no rhinorrhea, no shortness of breath, no sore throat and no wheezing    Associated symptoms comment:  Dizziness, several mins intermittent days on and off, room spinning noted, C/o veritgo      are as follows /76 (BP Location: Right arm)   Pulse 76   Temp 97 8 °F (36 6 °C) (Tympanic)   Resp 18   LMP 08/25/2022 (Approximate)   SpO2 100%   Review of Systems Review of Systems   Constitutional: Negative for chills and fever  HENT: Negative for rhinorrhea, sore throat and trouble swallowing  Eyes: Negative for pain  Respiratory: Negative for cough, shortness of breath, wheezing and stridor  Cardiovascular: Negative for chest pain and leg swelling  Gastrointestinal: Negative for abdominal pain, diarrhea and nausea  Endocrine: Negative for polyuria  Genitourinary: Negative for dysuria, flank pain and urgency     Musculoskeletal: Negative for joint swelling, myalgias and neck stiffness  Skin: Negative for rash  Allergic/Immunologic: Negative for immunocompromised state  Neurological: Negative for dizziness, syncope, weakness, numbness and headaches  Psychiatric/Behavioral: Negative for confusion and suicidal ideas  All other systems reviewed and are negative  Physical Exam remarkable for Physical Exam  Vitals and nursing note reviewed  Constitutional:       Appearance: Normal appearance  She is well-developed  HENT:      Head: Normocephalic and atraumatic  Nose: Nose normal       Mouth/Throat:      Mouth: Mucous membranes are moist    Eyes:      Extraocular Movements: Extraocular movements intact  Pupils: Pupils are equal, round, and reactive to light  Cardiovascular:      Rate and Rhythm: Normal rate and regular rhythm  Heart sounds: No murmur heard  No friction rub  Pulmonary:      Effort: No respiratory distress  Breath sounds: Normal breath sounds  No wheezing or rales  Abdominal:      General: Bowel sounds are normal  There is no distension  Palpations: Abdomen is soft  Tenderness: There is no abdominal tenderness  Musculoskeletal:         General: No tenderness  Normal range of motion  Cervical back: Normal range of motion and neck supple  Skin:     General: Skin is warm  Findings: No rash  Neurological:      Mental Status: She is alert and oriented to person, place, and time  Comments: GCS 15  AAOx4  No focal neuro deficits  CN II-XII intact  PERRL  EOMI  Ramonita Jerrod No pronator drift   strength 5/5 bilaterally  B/L UE strength 5/5 throughout  Finger to nose normal  Cerebellar function normal  Ambulates without difficulty  B/L LE strength 5/5 throughout  Gross sensation to b/l upper and lower extremities intact         Psychiatric:         Mood and Affect: Mood normal       Work up and treatment plan discussed with Treatment Team: Attending Provider: Momo Sandoval DO; Registered Nurse: Jessica Forbes RN; Resident: Cathleen Jon MD; ED Technician: Michael Feeling and agreed upon plan  MDM  Number of Diagnoses or Management Options  Diagnosis management comments: 47F left facial numbness noted, radiation to the left arm  No weakness noted  Hx of ha as well  Plan MRI  Amount and/or Complexity of Data Reviewed  Clinical lab tests: reviewed and ordered  Tests in the radiology section of CPT®: ordered and reviewed  Review and summarize past medical records: yes  Independent visualization of images, tracings, or specimens: yes              New Prescriptions:    New Prescriptions    No medications on file            Follow-up Instructions:    No follow-up provider specified        ED Course         Critical Care Time  Procedures

## 2022-08-30 NOTE — Clinical Note
Kamilla Leung was seen and treated in our emergency department on 8/30/2022  Diagnosis:     Jose G Akins  may return to work on return date, is off the rest of the shift today  She may return on this date: 08/31/2022         If you have any questions or concerns, please don't hesitate to call        Sung Bahena MD    ______________________________           _______________          _______________  Hospital Representative                              Date                                Time

## 2022-08-30 NOTE — DISCHARGE INSTRUCTIONS
You were seen in the Emergency Department today for dizziness, numbness in the face and arm  Performed an MRI of your head neck and brain all of which were normal     Please be sure to follow-up with her primary care doctor for regular follow-up  Please also make an appointment with Cardiology for further evaluation  Please follow up with your primary care doctor in 2 to 3 days  Please return to the Emergency Department if you experience worsening of your current symptoms or any other concerning symptoms

## 2022-08-31 ENCOUNTER — HOSPITAL ENCOUNTER (OUTPATIENT)
Dept: INFUSION CENTER | Facility: HOSPITAL | Age: 47
Discharge: HOME | End: 2022-08-31
Attending: INTERNAL MEDICINE
Payer: COMMERCIAL

## 2022-08-31 VITALS
DIASTOLIC BLOOD PRESSURE: 68 MMHG | SYSTOLIC BLOOD PRESSURE: 142 MMHG | TEMPERATURE: 97.7 F | HEART RATE: 80 BPM | RESPIRATION RATE: 20 BRPM

## 2022-08-31 DIAGNOSIS — D50.9 IRON DEFICIENCY ANEMIA, UNSPECIFIED IRON DEFICIENCY ANEMIA TYPE: ICD-10-CM

## 2022-08-31 DIAGNOSIS — N92.0 MENORRHAGIA WITH REGULAR CYCLE: ICD-10-CM

## 2022-08-31 DIAGNOSIS — E53.8 VITAMIN B 12 DEFICIENCY: Primary | ICD-10-CM

## 2022-08-31 LAB
ATRIAL RATE: 78 BPM
P AXIS: 54 DEGREES
PR INTERVAL: 166 MS
QRS AXIS: 99 DEGREES
QRSD INTERVAL: 82 MS
QT INTERVAL: 408 MS
QTC INTERVAL: 465 MS
T WAVE AXIS: 16 DEGREES
VENTRICULAR RATE: 78 BPM

## 2022-08-31 PROCEDURE — 96365 THER/PROPH/DIAG IV INF INIT: CPT

## 2022-08-31 PROCEDURE — 96366 THER/PROPH/DIAG IV INF ADDON: CPT

## 2022-08-31 PROCEDURE — 93010 ELECTROCARDIOGRAM REPORT: CPT | Performed by: INTERNAL MEDICINE

## 2022-08-31 PROCEDURE — 96372 THER/PROPH/DIAG INJ SC/IM: CPT

## 2022-08-31 RX ORDER — CYANOCOBALAMIN 1000 UG/ML
1000 INJECTION, SOLUTION INTRAMUSCULAR; SUBCUTANEOUS ONCE
Status: CANCELLED | OUTPATIENT
Start: 2022-09-01 | End: 2022-09-01

## 2022-08-31 RX ORDER — SODIUM CHLORIDE 9 MG/ML
20 INJECTION, SOLUTION INTRAVENOUS ONCE
Status: COMPLETED | OUTPATIENT
Start: 2022-08-31 | End: 2022-08-31

## 2022-08-31 RX ORDER — CYANOCOBALAMIN 1000 UG/ML
1000 INJECTION, SOLUTION INTRAMUSCULAR; SUBCUTANEOUS ONCE
Status: COMPLETED | OUTPATIENT
Start: 2022-08-31 | End: 2022-08-31

## 2022-08-31 RX ORDER — SODIUM CHLORIDE 9 MG/ML
20 INJECTION, SOLUTION INTRAVENOUS ONCE
Status: CANCELLED | OUTPATIENT
Start: 2022-09-01

## 2022-08-31 RX ADMIN — CYANOCOBALAMIN 1000 MCG: 1000 INJECTION, SOLUTION INTRAMUSCULAR at 13:47

## 2022-08-31 RX ADMIN — SODIUM CHLORIDE 20 ML/HR: 0.9 INJECTION, SOLUTION INTRAVENOUS at 13:35

## 2022-08-31 RX ADMIN — IRON SUCROSE 300 MG: 20 INJECTION, SOLUTION INTRAVENOUS at 13:47

## 2022-08-31 NOTE — PROGRESS NOTES
Pt tolerated venofer infusion without difficulty  No s/s reaction noted  Aware of office follow up in February and to have labs drawn    Left ambulatory declining AVS

## 2022-09-16 ENCOUNTER — OFFICE VISIT (OUTPATIENT)
Dept: CARDIOLOGY CLINIC | Facility: CLINIC | Age: 47
End: 2022-09-16
Payer: COMMERCIAL

## 2022-09-16 VITALS
HEIGHT: 66 IN | WEIGHT: 174.8 LBS | DIASTOLIC BLOOD PRESSURE: 84 MMHG | OXYGEN SATURATION: 99 % | HEART RATE: 76 BPM | SYSTOLIC BLOOD PRESSURE: 132 MMHG | BODY MASS INDEX: 28.09 KG/M2

## 2022-09-16 DIAGNOSIS — R07.9 CHEST PAIN, UNSPECIFIED TYPE: ICD-10-CM

## 2022-09-16 DIAGNOSIS — R00.2 PALPITATIONS: Primary | ICD-10-CM

## 2022-09-16 PROCEDURE — 99243 OFF/OP CNSLTJ NEW/EST LOW 30: CPT | Performed by: INTERNAL MEDICINE

## 2022-09-16 NOTE — PROGRESS NOTES
CARDIOLOGY OFFICE VISIT     PATIENT INFORMATION     Nia Mcrae   52 y o  female   MRN: [id number]    ASSESSMENT/PLAN     1  Chest pain:   Patient admits to having chest pain along with palpitations  Has been ongoing for past few months  Chest pain is worse with exertion although also sometimes present at rest   Worse pain 8/10  Chest pain accompanied by diaphoresis  Admits That this chest pain is more like squeezing in nature and heavy feeling in her chest  Strong family history of cardiovascular disease in siblings  Pt is able to walk and is willing for treadmill stress testing  Will get stress echo  F/u in 2 months  2  Palpitations: Patient admits to having episodes of palpitations for past few months  Admits that  She feels like her heart is pounding  Palpitations are also accompanied by chest pain as described above  EKG at ER: NSR  Will get 30 day monitor  F/u in 2 months  Diagnoses and all orders for this visit:    Palpitations  -     AMB extended holter monitor; Future    Chest pain, unspecified type  -     Echo stress test, exercise; Future      Schedule a follow-up appointment in 2 months  HEALTH MAINTENANCE     Immunization History   Administered Date(s) Administered    Tdap 04/11/2016, 04/11/2016     CHIEF COMPLAINT     Chief Complaint   Patient presents with    Consult     Pt is here for dizziness and lightheadedness, and heart palpitations, swelling of LE      HISTORY OF PRESENT ILLNESS      this is 26-year-old female with no significant cardiovascular history is presenting to the clinic with chief complaint of chest pain and palpitations  Patient recently went to ER with left-sided facial numbness and left arm numbness for which stroke workup was negative  Patient was referred to Cardiology given her  Family history of cardiovascular disease and complains of chest pain    Patient admits that she has been having chest pain for past few months which is worse with exertion  She works in a [employer]  Her symptoms are worse with walking and taking stairs and it gets better with rest   Her chest pain is also sometimes present at rest although less in severity  Also has diaphoresis and palpitations well along with her chest pain  REVIEW OF SYSTEMS     Review of Systems   Constitutional: Negative for activity change, appetite change, diaphoresis, fatigue and fever  HENT: Negative for congestion, sinus pressure, sinus pain and sore throat  Respiratory: Negative for apnea, chest tightness, shortness of breath and stridor  Cardiovascular: Positive for chest pain and palpitations  Negative for leg swelling  Gastrointestinal: Negative for abdominal pain, constipation and diarrhea  OBJECTIVE     Vitals:    09/16/22 1559   BP: 132/84   BP Location: Right arm   Patient Position: Sitting   Cuff Size: Standard   Pulse: 76   SpO2: 99%   Weight: 79 3 kg (174 lb 12 8 oz)   Height: 5' 6" (1 676 m)     Physical Exam  Vitals reviewed  Constitutional:       General: She is not in acute distress  Appearance: She is well-developed  She is not diaphoretic  Cardiovascular:      Rate and Rhythm: Normal rate and regular rhythm  Heart sounds: Normal heart sounds  No murmur heard  No friction rub  Pulmonary:      Effort: Pulmonary effort is normal  No respiratory distress  Breath sounds: Normal breath sounds  No stridor  Abdominal:      General: Bowel sounds are normal  There is no distension  Palpations: Abdomen is soft  Tenderness: There is no abdominal tenderness  There is no rebound  Psychiatric:         Behavior: Behavior normal          Thought Content:  Thought content normal        CURRENT MEDICATIONS     Current Outpatient Medications:     acetaminophen (TYLENOL) 500 mg tablet, Take 500 mg by mouth every 6 (six) hours as needed for mild pain, Disp: , Rfl:     PAST MEDICAL & SURGICAL HISTORY     Past Medical History:   Diagnosis Date  Anemia     Back pain, acute     Gastritis     GERD (gastroesophageal reflux disease)     History of transfusion 08/2021    Non-cardiac chest pain     Shortness of breath     with low hgb     Past Surgical History:   Procedure Laterality Date    EGD      ENDOMETRIAL ABLATION N/A 1/21/2022    Procedure: HYSTEROSCOPY ABLATION ENDOMETRIAL Dante Sharp;  Surgeon: Phuc Woody MD;  [facility];  Service: Gynecology    [place] HYSTEROSCOPY,W/ENDO BX N/A 1/21/2022    Procedure: DILATATION AND CURETTAGE (D&C) WITH HYSTEROSCOPY;  Surgeon: Phuc Woody MD;  [facility];  Service: Gynecology    TUBAL LIGATION       SOCIAL & FAMILY HISTORY     Social History     Socioeconomic History    Marital status: Single     Spouse name: Not on file    Number of children: Not on file    Years of education: Not on file    Highest education level: Not on file   Occupational History    Not on file   Tobacco Use    Smoking status: Never Smoker    Smokeless tobacco: Never Used   Vaping Use    Vaping Use: Never used   Substance and Sexual Activity    Alcohol use: Never    Drug use: Never    Sexual activity: Yes     Partners: Male     Birth control/protection: Female Sterilization   Other Topics Concern    Not on file   Social History Narrative    Not on file     Social Determinants of Health     Financial Resource Strain: Not on file   Food Insecurity: Not on file   Transportation Needs: Not on file   Physical Activity: Not on file   Stress: Not on file   Social Connections: Not on file   Intimate Partner Violence: Not on file   Housing Stability: Not on file     Social History     Substance and Sexual Activity   Alcohol Use Never     Social History     Substance and Sexual Activity   Drug Use Never     Social History     Tobacco Use   Smoking Status Never Smoker   Smokeless Tobacco Never Used     Family History   Problem Relation Age of Onset    Hypertension Mother     Stroke Mother     No Known Problems Father     Hypertension Maternal Grandmother     Stroke Maternal Grandmother     Hypertension Maternal Uncle     Stroke Maternal Uncle     Stroke Paternal Aunt     No Known Problems Sister     HIV Brother     No Known Problems Daughter     No Known Problems Son     No Known Problems Maternal Grandfather     No Known Problems Paternal Grandmother     No Known Problems Paternal Grandfather     No Known Problems Sister     No Known Problems Daughter      ==  Savage Win, DO  PGY-4  CARDIOLOGY FELLOW

## 2022-10-06 ENCOUNTER — TELEPHONE (OUTPATIENT)
Dept: CARDIOLOGY CLINIC | Facility: CLINIC | Age: 47
End: 2022-10-06

## 2022-10-06 NOTE — TELEPHONE ENCOUNTER
----- Message from Tori Lugo sent at 10/4/2022  7:54 AM EDT -----  Per call from [facility] @ [employer] and their online precert resource list, aut is not required for 81359/30-day ambulatory event monitor  This code is listed on the [facility] fee schedule  This is a valid and billable codes  ----- Message -----  From: Mirza Rebolledo MA  Sent: 10/3/2022   4:41 PM EDT  To: ISELA Lugo Dr wants a 30 day monitor    ----- Message -----  From: Ramakrishna Jennings MA  Sent: 10/3/2022   2:10 PM EDT  To: Mirza Rebolledo MA    We will have the send the results from the first ZIO to [facility] to obtain the second   ----- Message -----  From: Mirza Rebolledo MA  Sent: 10/3/2022   2:06 PM EDT  To: ISELA Lugo Dr order for 30 days  Is this prior auth for TWO 2 WEEK ZIO?  ----- Message -----  From: Ramakrishna Jennings MA  Sent: 10/3/2022   1:28 PM EDT  To: Mirza Rebolledo MA    [facility] [employer] approved auth # [id number] for 8S NRN/[id number]  Valid dates:  10/3/22-12/31/22  ----- Message -----  From: Ramakrishna Jennings MA  Sent: 9/29/2022  10:57 AM EDT  To: Ramakrishna Jennings MA, Mirza Rebolledo MA    [facility] [employer] precert request form faxed for Tyron [facility]/[id number]      ----- Message -----  From: Mirza Rebolledo MA  Sent: 9/29/2022  10:48 AM EDT  To: Cardiology Pre Certification    Hello please check for prior auth for two 2 week zio monitor   Thank you

## 2022-10-14 ENCOUNTER — TELEPHONE (OUTPATIENT)
Dept: CARDIOLOGY CLINIC | Facility: CLINIC | Age: 47
End: 2022-10-14

## 2022-10-14 NOTE — TELEPHONE ENCOUNTER
Daughter called to double check that echo stress can't be done if you are currently wearing an zio, advised that is correct       Number given to call and reschedule echo stress test      Verbally understood

## 2022-10-28 ENCOUNTER — TELEPHONE (OUTPATIENT)
Dept: HEMATOLOGY ONCOLOGY | Facility: CLINIC | Age: 47
End: 2022-10-28

## 2022-10-28 NOTE — TELEPHONE ENCOUNTER
Patient calling to update her phone number  I confirmed with patient that the phone number she provided is the phone number that we have on file for her       Patient voiced understanding

## 2022-11-05 ENCOUNTER — APPOINTMENT (OUTPATIENT)
Dept: LAB | Facility: CLINIC | Age: 47
End: 2022-11-05

## 2022-11-09 ENCOUNTER — DOCUMENTATION (OUTPATIENT)
Dept: CARDIOLOGY CLINIC | Facility: CLINIC | Age: 47
End: 2022-11-09

## 2022-11-10 ENCOUNTER — TELEPHONE (OUTPATIENT)
Dept: HEMATOLOGY ONCOLOGY | Facility: CLINIC | Age: 47
End: 2022-11-10

## 2022-11-10 NOTE — TELEPHONE ENCOUNTER
CALL RETURN FORM   Reason for patient call? Patient is calling to get her results on the blood work she received  Patient's primary oncologist? Cheryl Francois     Name of person the patient was calling for? Esturado Cruz    Any additional information to add, if applicable? N/A   Informed patient that the message will be forwarded to the team and someone will get back to them as soon as possible    Did you relay this information to the patient?   Yes

## 2022-11-18 ENCOUNTER — OFFICE VISIT (OUTPATIENT)
Dept: DENTISTRY | Facility: CLINIC | Age: 47
End: 2022-11-18

## 2022-11-18 VITALS — DIASTOLIC BLOOD PRESSURE: 100 MMHG | HEART RATE: 73 BPM | SYSTOLIC BLOOD PRESSURE: 172 MMHG

## 2022-11-18 DIAGNOSIS — Z59.9 FINANCIAL DIFFICULTIES: ICD-10-CM

## 2022-11-18 DIAGNOSIS — K08.9 EXTRACTION OF TOOTH NEEDED: ICD-10-CM

## 2022-11-18 DIAGNOSIS — Z01.20 ENCOUNTER FOR DENTAL EXAMINATION AND CLEANING WITHOUT ABNORMAL FINDINGS: ICD-10-CM

## 2022-11-18 DIAGNOSIS — K08.89 TOOTH PAIN: Primary | ICD-10-CM

## 2022-11-18 SDOH — ECONOMIC STABILITY - INCOME SECURITY: PROBLEM RELATED TO HOUSING AND ECONOMIC CIRCUMSTANCES, UNSPECIFIED: Z59.9

## 2022-11-18 NOTE — PROGRESS NOTES
Prophy    Dental procedures in this visit   •  - PROPHYLAXIS - ADULT (Completed)     Service provider: Natali Wiggins     Billing provider: Guilherme Kta DDS   •  - PERIODIC ORAL EVALUATION - ESTABLISHED PATIENT (Completed)     Service provider: Richa Liriano DMD     Billing provider: Guilherme Kat DDS      Completion details   •  - PROPHYLAXIS - ADULT (Completed)   •  - PERIODIC ORAL EVALUATION - ESTABLISHED PATIENT (Completed)    See notes         CC:  None  Reviewed Medical History  ASA: I  Translation line used: yes for Qatari    Method Used:  · Prophy Method Used: Hand Scaling  · Polished  · Flossed    Radiographs Taken:  · None    Intra/Extra Oral Cancer Screening:  · Within normal limits      Oral Hygiene:  · Good    Plaque:  · Localized  · Light    Calculus:  · Localized  · Light  · Posteriors    Bleeding:  · Bleeding on probing: No periodontal exam for this visit  · Localized  · Light    Stain:  · Localized  · Light      OHI: Stressed importance of brushing 2x/day and flossing daily  Recommended daily mouthrinse  Natali Wiggins TAMMY     Orders Placed This Encounter   Procedures   • Ambulatory referral to social work care management program     Standing Status:   Future     Standing Expiration Date:   11/18/2023     Referral Priority:   Routine     Referral Type:   Consult - AMB     Referral Reason:   Specialty Services Required     Number of Visits Requested:   1     Expiration Date:   11/18/2023           Periodic Exam:  Dr Spring Bowens    did exam:    Decay present: yes   #15-MO  #20-DO  Recommended replacing crown #7 when other fillings are finished, open margin seen on radiograph  May need a rct- pt was made aware  Pt states she is having pain upon chewing on LR #32 and wants this tooth out  Gave ref to OS today for ext #32  Monitor #30-OB, #31-OB    OCS-neg    Pt dismissed in good health, no complications and all questions answered       NV: filling #15, eval NV2: #20 filling  NV3: 6 mrc, periodic exam, 4 bws with hygiene

## 2022-11-22 ENCOUNTER — HOSPITAL ENCOUNTER (OUTPATIENT)
Dept: NON INVASIVE DIAGNOSTICS | Facility: HOSPITAL | Age: 47
Discharge: HOME | End: 2022-11-22

## 2022-11-22 VITALS — HEIGHT: 66 IN | BODY MASS INDEX: 27.97 KG/M2 | WEIGHT: 174 LBS

## 2022-11-22 DIAGNOSIS — R07.9 CHEST PAIN, UNSPECIFIED TYPE: ICD-10-CM

## 2022-11-22 LAB
BASELINE ST DEPRESSION: 0 MM
E WAVE DECELERATION TIME: 228 MS
MAX HEART RATE: 104 BPM
MAX HR PERCENT: 100 %
MAX HR: 173 BPM
MAX PREDICTED HEART RATE: 173 BPM
MV PEAK A VEL: 0.64 M/S
MV PEAK E VEL: 83 CM/S
MV STENOSIS PRESSURE HALF TIME: 66 MS
MV VALVE AREA P 1/2 METHOD: 3.33 CM2
PROTOCOL NAME: NORMAL
RATE PRESSURE PRODUCT: NORMAL
REASON FOR TERMINATION: NORMAL
SL CV LV EF: 60
SL CV STRESS RECOVERY BP: NORMAL MMHG
SL CV STRESS RECOVERY HR: 96 BPM
SL CV STRESS RECOVERY O2 SAT: 99 %
SL CV STRESS STAGE REACHED: 2
STRESS ANGINA INDEX: 0
STRESS BASELINE BP: NORMAL MMHG
STRESS BASELINE HR: 82 BPM
STRESS O2 SAT REST: 100 %
STRESS PEAK HR: 173 BPM
STRESS PERCENT HR: 100 %
STRESS POST ESTIMATED WORKLOAD: 7 METS
STRESS POST EXERCISE DUR MIN: 5 MIN
STRESS POST O2 SAT PEAK: 98 %
STRESS POST PEAK BP: 170 MMHG
TARGET HR FORMULA: NORMAL
TEST INDICATION: NORMAL
TIME IN EXERCISE PHASE: NORMAL

## 2022-12-07 ENCOUNTER — DOCUMENTATION (OUTPATIENT)
Dept: NON INVASIVE DIAGNOSTICS | Facility: HOSPITAL | Age: 47
End: 2022-12-07

## 2022-12-07 NOTE — PROGRESS NOTES
I saw this pt on 9/16/22 for chest pain and palpitations  She had stress echo and 4 week monitor ordered  Her monitor (Bio tel report under media) showed one episode of mobitz type 1 at 1:31 am on 11/2  Pt told me that she was sleeping at that time  She denies any more palpitations or dizziness episodes  Her stress echo had poor endocardial border although it was negative for any significant ischemia  She denies any chest pain episodes after the office visit with me  I reminded of her f/u visit with me on 12/16/2022  I will discuss further step when I meet her in office

## 2022-12-16 ENCOUNTER — OFFICE VISIT (OUTPATIENT)
Dept: CARDIOLOGY CLINIC | Facility: CLINIC | Age: 47
End: 2022-12-16

## 2022-12-16 VITALS
OXYGEN SATURATION: 99 % | WEIGHT: 175 LBS | BODY MASS INDEX: 28.12 KG/M2 | SYSTOLIC BLOOD PRESSURE: 136 MMHG | DIASTOLIC BLOOD PRESSURE: 80 MMHG | HEART RATE: 72 BPM | HEIGHT: 66 IN

## 2022-12-16 DIAGNOSIS — R00.2 PALPITATIONS: Primary | ICD-10-CM

## 2022-12-16 DIAGNOSIS — R07.89 CHEST PAIN, ATYPICAL: ICD-10-CM

## 2022-12-16 NOTE — PROGRESS NOTES
CARDIOLOGY OFFICE VISIT     PATIENT INFORMATION     Seven Mccray   52 y o  female   MRN: [id number]    ASSESSMENT/PLAN     1  Chest pain: pt presenting as follow up to discuss her stress echo results  pt had stress echo ordered which was overall unremarkable  She denies any significant episodes of chest pain over past 3 months  Her chest pain is likely from stress at [facility] and some MSK component to it  Low likelihood of ischemia given her work up  Pt was advised to ask her PCP for PT and medications to help with her MSK pain  F/u as needed  2  Palpitations: pt also had 4 week [employer] monitor ordered during last office visit  The report was overall unremarkable except one episodes of mobitz type 1 at 1:31 am when pt was sleeping  Report to be scanned in chart  3  HTN: /80  Pt was asked to check her BP at home and bring her log to her next PCP office  There are no diagnoses linked to this encounter  Schedule a follow-up appointment in [place]  HEALTH MAINTENANCE     Immunization History   Administered Date(s) Administered   • Tdap 04/11/2016, 04/11/2016     CHIEF COMPLAINT     Chief Complaint   Patient presents with   • Follow-up     Feels better no cardiac concerns  HISTORY OF PRESENT ILLNESS     This is 51 yo female with no significant risk factors other than family hx of CAD is coming in for f/u for her stress echo and 4 weeks monitor  Since her stress echo, she has been doing well  She denies any worsening of her chest pain or palpitations  She is a non smoker  Non alcohol  Her work involved heavy box lifting which is causing stress for her  She also has stress at home  REVIEW OF SYSTEMS     Review of Systems   Constitutional: Negative for activity change, appetite change, diaphoresis, fatigue and fever  HENT: Negative for congestion, sinus pressure, sinus pain and sore throat      Respiratory: Negative for apnea, chest tightness, shortness of breath and stridor  Cardiovascular: Negative for chest pain, palpitations and leg swelling  Gastrointestinal: Negative for abdominal pain, constipation and diarrhea  OBJECTIVE     Vitals:    12/16/22 0957   BP: 136/80   BP Location: Right arm   Patient Position: Sitting   Cuff Size: Large   Pulse: 72   SpO2: 99%   Weight: 79 4 kg (175 lb)   Height: 5' 6" (1 676 m)     Physical Exam  Vitals reviewed  Constitutional:       General: She is not in acute distress  Appearance: She is well-developed  She is not diaphoretic  Cardiovascular:      Rate and Rhythm: Normal rate and regular rhythm  Heart sounds: Normal heart sounds  No murmur heard  No friction rub  Pulmonary:      Effort: Pulmonary effort is normal  No respiratory distress  Breath sounds: Normal breath sounds  No stridor  Abdominal:      General: Bowel sounds are normal  There is no distension  Palpations: Abdomen is soft  Tenderness: There is no abdominal tenderness  There is no rebound  Psychiatric:         Behavior: Behavior normal          Thought Content:  Thought content normal        CURRENT MEDICATIONS     Current Outpatient Medications:   •  acetaminophen (TYLENOL) 500 mg tablet, Take 500 mg by mouth every 6 (six) hours as needed for mild pain, Disp: , Rfl:     PAST MEDICAL & SURGICAL HISTORY     Past Medical History:   Diagnosis Date   • Anemia    • Back pain, acute    • Gastritis    • GERD (gastroesophageal reflux disease)    • History of transfusion 08/2021   • Non-cardiac chest pain    • Shortness of breath     with low hgb     Past Surgical History:   Procedure Laterality Date   • EGD     • ENDOMETRIAL ABLATION N/A 1/21/2022    Procedure: HYSTEROSCOPY ABLATION ENDOMETRIAL Chryl Shank;  Surgeon: Renata Cherry MD;  [facility];  Service: Gynecology   • [place] HYSTEROSCOPY,W/ENDO BX N/A 1/21/2022    Procedure: DILATATION AND CURETTAGE (D&C) WITH HYSTEROSCOPY;  Surgeon: Renata Cherry MD;  [facility]; Service: Gynecology   • TUBAL LIGATION       SOCIAL & FAMILY HISTORY     Social History     Socioeconomic History   • Marital status: Single     Spouse name: Not on file   • Number of children: Not on file   • Years of education: Not on file   • Highest education level: Not on file   Occupational History   • Not on file   Tobacco Use   • Smoking status: Never   • Smokeless tobacco: Never   Vaping Use   • Vaping Use: Never used   Substance and Sexual Activity   • Alcohol use: Never   • Drug use: Never   • Sexual activity: Yes     Partners: Male     Birth control/protection: Female Sterilization   Other Topics Concern   • Not on file   Social History Narrative   • Not on file     Social Determinants of Health     Financial Resource Strain: High Risk   • Difficulty of Paying Living Expenses: Hard   Food Insecurity: Food Insecurity Present   • Worried About Running Out of Food in the Last Year: Sometimes true   • Ran Out of Food in the Last Year: Sometimes true   Transportation Needs: No Transportation Needs   • Lack of Transportation (Medical): No   • Lack of Transportation (Non-Medical):  No   Physical Activity: Not on file   Stress: Not on file   Social Connections: Not on file   Intimate Partner Violence: Not on file   Housing Stability: Not on file     Social History     Substance and Sexual Activity   Alcohol Use Never     Social History     Substance and Sexual Activity   Drug Use Never     Social History     Tobacco Use   Smoking Status Never   Smokeless Tobacco Never     Family History   Problem Relation Age of Onset   • Hypertension Mother    • Stroke Mother    • No Known Problems Father    • Hypertension Maternal Grandmother    • Stroke Maternal Grandmother    • Hypertension Maternal Uncle    • Stroke Maternal Uncle    • Stroke Paternal Aunt    • No Known Problems Sister    • HIV Brother    • No Known Problems Daughter    • No Known Problems Son    • No Known Problems Maternal Grandfather    • No Known Problems Paternal Grandmother    • No Known Problems Paternal Grandfather    • No Known Problems Sister    • No Known Problems Daughter      ==  Fara Gandara, DO  PGY-4  CARDIOLOGY FELLOW

## 2022-12-20 ENCOUNTER — PATIENT OUTREACH (OUTPATIENT)
Dept: DENTISTRY | Facility: CLINIC | Age: 47
End: 2022-12-20

## 2022-12-20 NOTE — PROGRESS NOTES
Chart review indicates that a referral was made by Gabriela Delatorre for at risk responses to social determinants of health  Review indicates that patient pcp is Dr April Knutson, she does follow with [facility] Cardiology  No prior care coordination involvement found  [facility] outreached patient via [address]  #[phone], patient lives with her spouse and adult son  They rent their home  No government assistance  Both patient and spouse are employed  Patient reports being able to afford all of her bills including rent and utility  She reports having enough food and denies food insecurity at this time  [facility] did inform patient that SNAP income limits did increase in October and sent her update with information to e-mail on file  Patients daughter also on phone and is aware of how to assist patient by applying on [employer] web site if she believes she will now qualify  Patient has a car and daughter can also assist with transportation  No other reported needs at this time  Patient will outreach [facility] with any further needs  Will remain available for support

## 2023-01-07 ENCOUNTER — APPOINTMENT (OUTPATIENT)
Dept: LAB | Facility: CLINIC | Age: 48
End: 2023-01-07

## 2023-01-07 DIAGNOSIS — E53.8 VITAMIN B12 DEFICIENCY: ICD-10-CM

## 2023-01-07 DIAGNOSIS — N92.0 MENORRHAGIA WITH REGULAR CYCLE: ICD-10-CM

## 2023-01-07 DIAGNOSIS — D50.9 IRON DEFICIENCY ANEMIA, UNSPECIFIED IRON DEFICIENCY ANEMIA TYPE: ICD-10-CM

## 2023-01-07 LAB
ALBUMIN SERPL BCP-MCNC: 3.5 G/DL (ref 3.5–5)
ALP SERPL-CCNC: 69 U/L (ref 46–116)
ALT SERPL W P-5'-P-CCNC: 17 U/L (ref 12–78)
ANION GAP SERPL CALCULATED.3IONS-SCNC: 5 MMOL/L (ref 4–13)
AST SERPL W P-5'-P-CCNC: 11 U/L (ref 5–45)
BASOPHILS # BLD AUTO: 0.04 THOUSANDS/ÂΜL (ref 0–0.1)
BASOPHILS NFR BLD AUTO: 1 % (ref 0–1)
BILIRUB SERPL-MCNC: 0.5 MG/DL (ref 0.2–1)
BUN SERPL-MCNC: 8 MG/DL (ref 5–25)
CALCIUM SERPL-MCNC: 9.2 MG/DL (ref 8.3–10.1)
CHLORIDE SERPL-SCNC: 105 MMOL/L (ref 96–108)
CO2 SERPL-SCNC: 29 MMOL/L (ref 21–32)
CREAT SERPL-MCNC: 0.54 MG/DL (ref 0.6–1.3)
EOSINOPHIL # BLD AUTO: 0.19 THOUSAND/ÂΜL (ref 0–0.61)
EOSINOPHIL NFR BLD AUTO: 3 % (ref 0–6)
ERYTHROCYTE [DISTWIDTH] IN BLOOD BY AUTOMATED COUNT: 12.8 % (ref 11.6–15.1)
FERRITIN SERPL-MCNC: 8 NG/ML (ref 8–388)
GFR SERPL CREATININE-BSD FRML MDRD: 112 ML/MIN/1.73SQ M
GLUCOSE P FAST SERPL-MCNC: 106 MG/DL (ref 65–99)
HCT VFR BLD AUTO: 39.9 % (ref 34.8–46.1)
HGB BLD-MCNC: 12.7 G/DL (ref 11.5–15.4)
IMM GRANULOCYTES # BLD AUTO: 0.01 THOUSAND/UL (ref 0–0.2)
IMM GRANULOCYTES NFR BLD AUTO: 0 % (ref 0–2)
IRON SATN MFR SERPL: 15 % (ref 15–50)
IRON SERPL-MCNC: 46 UG/DL (ref 50–170)
LYMPHOCYTES # BLD AUTO: 1.37 THOUSANDS/ÂΜL (ref 0.6–4.47)
LYMPHOCYTES NFR BLD AUTO: 22 % (ref 14–44)
MCH RBC QN AUTO: 27.9 PG (ref 26.8–34.3)
MCHC RBC AUTO-ENTMCNC: 31.8 G/DL (ref 31.4–37.4)
MCV RBC AUTO: 88 FL (ref 82–98)
MONOCYTES # BLD AUTO: 0.54 THOUSAND/ÂΜL (ref 0.17–1.22)
MONOCYTES NFR BLD AUTO: 9 % (ref 4–12)
NEUTROPHILS # BLD AUTO: 4.15 THOUSANDS/ÂΜL (ref 1.85–7.62)
NEUTS SEG NFR BLD AUTO: 65 % (ref 43–75)
NRBC BLD AUTO-RTO: 0 /100 WBCS
PLATELET # BLD AUTO: 370 THOUSANDS/UL (ref 149–390)
PMV BLD AUTO: 9.5 FL (ref 8.9–12.7)
POTASSIUM SERPL-SCNC: 4 MMOL/L (ref 3.5–5.3)
PROT SERPL-MCNC: 7.6 G/DL (ref 6.4–8.4)
RBC # BLD AUTO: 4.56 MILLION/UL (ref 3.81–5.12)
SODIUM SERPL-SCNC: 139 MMOL/L (ref 135–147)
TIBC SERPL-MCNC: 312 UG/DL (ref 250–450)
VIT B12 SERPL-MCNC: 463 PG/ML (ref 100–900)
WBC # BLD AUTO: 6.3 THOUSAND/UL (ref 4.31–10.16)

## 2023-01-17 ENCOUNTER — OFFICE VISIT (OUTPATIENT)
Dept: DENTISTRY | Facility: CLINIC | Age: 48
End: 2023-01-17

## 2023-01-17 VITALS — DIASTOLIC BLOOD PRESSURE: 76 MMHG | HEART RATE: 72 BPM | TEMPERATURE: 98.2 F | SYSTOLIC BLOOD PRESSURE: 138 MMHG

## 2023-01-17 DIAGNOSIS — K02.9 SECONDARY DENTAL CARIES ASSOCIATED WITH FAILED OR DEFECTIVE DENTAL RESTORATION: Primary | ICD-10-CM

## 2023-01-17 DIAGNOSIS — K08.50 SECONDARY DENTAL CARIES ASSOCIATED WITH FAILED OR DEFECTIVE DENTAL RESTORATION: Primary | ICD-10-CM

## 2023-01-17 NOTE — PROGRESS NOTES
Composite Filling    Kassy Garcia presents for composite filling #15 MO  PMH reviewed, no changes  Discussed with patient need for RCT if pulp exposure occurs or in future if pulp is inflamed  Pt understands and consents  Applied topical benzocaine, administered 1 carpule 4% articaine 1:100k epi via buccal infiltrations  Prepped tooth #15 MO with 245 carbide on high speed  Caries indictor dye used  Caries removed  Placed tofflemire matrix  Isolation with cotton rolls and dri-angles    Etch with 37% H2PO4, rinse, dry  Applied Adhese with 20 second scrub once, gentle air dry and light cured for 10s  Restored with flowable composite and Tetric bulk kiara shade A2  Light cured  Refined with finishing burs, polished with enhance point  Verified occlusion and contacts  Pt left ambulatory and satisfied        NV: #20 DO resin    NV2: #7 crown

## 2023-01-18 ENCOUNTER — OFFICE VISIT (OUTPATIENT)
Dept: DENTISTRY | Facility: CLINIC | Age: 48
End: 2023-01-18

## 2023-01-18 DIAGNOSIS — K08.89 PAIN, DENTAL: Primary | ICD-10-CM

## 2023-01-18 NOTE — PROGRESS NOTES
Limited Oral Evaluation    Erika [place] presented to [facility] for limited oral evaluation for CC "I feel like I'm biting on a pebble " PMH reviewed, no changes  Welsh iPAD translation used  #15 MO restoration completed 1/17/23 without any complications or pulp exposure  BW and PA radiograph of #15 taken  No PAP or pulp exposure  Restoration was not approaching pulp  Checked occlusion  High occlusion  Adjust occlusion to pt's comfort  Pt confirmed it felt better  Pt left ambulatory and satisfied       NV: #20 DO resin

## 2023-01-25 ENCOUNTER — OFFICE VISIT (OUTPATIENT)
Dept: DENTISTRY | Facility: CLINIC | Age: 48
End: 2023-01-25

## 2023-01-25 VITALS — SYSTOLIC BLOOD PRESSURE: 146 MMHG | DIASTOLIC BLOOD PRESSURE: 85 MMHG | HEART RATE: 86 BPM | TEMPERATURE: 96.9 F

## 2023-01-25 DIAGNOSIS — B99.9 INFECTION: Primary | ICD-10-CM

## 2023-01-25 RX ORDER — AMOXICILLIN 500 MG/1
500 CAPSULE ORAL EVERY 8 HOURS SCHEDULED
Qty: 21 CAPSULE | Refills: 0 | Status: SHIPPED | OUTPATIENT
Start: 2023-01-25 | End: 2023-02-01

## 2023-01-25 NOTE — PROGRESS NOTES
Misael Sender presents to clinic for emergency appointment  [facility], no changes  Pt  Has no allergies  Pt  Had tooth #32 extracted last Friday at an outside office and reports having taken 1 600mg ibuprofen and 1 tylenol over the past few days  Pt  Does not like taking medication even though she is in pain  IOE: note dry socket in the area of #32    Administered 1 carp 4% articaine w 1:100k epi via local infiltration  Confirmed sufficient pt  Anesthesia  Used curette to remove evident intraoral infection and sterile water to irrigate site  Placed resorbable sterile sponge with Dry Socket liquid into extraction site and sent pt  Home biting on gauze  Rx written for amoxicillin 7 day course to [facility]  Informed pt  To take tylenol and ibuprofen for pain      NV: f/u on dry socket if needed  NNV: 20-DO

## 2023-02-09 ENCOUNTER — TELEPHONE (OUTPATIENT)
Dept: HEMATOLOGY ONCOLOGY | Facility: CLINIC | Age: 48
End: 2023-02-09

## 2023-02-09 ENCOUNTER — OFFICE VISIT (OUTPATIENT)
Dept: HEMATOLOGY ONCOLOGY | Facility: CLINIC | Age: 48
End: 2023-02-09

## 2023-02-09 VITALS
DIASTOLIC BLOOD PRESSURE: 80 MMHG | OXYGEN SATURATION: 100 % | WEIGHT: 175 LBS | TEMPERATURE: 97.3 F | HEIGHT: 66 IN | SYSTOLIC BLOOD PRESSURE: 148 MMHG | BODY MASS INDEX: 28.12 KG/M2 | HEART RATE: 80 BPM

## 2023-02-09 DIAGNOSIS — E53.8 VITAMIN B 12 DEFICIENCY: Primary | ICD-10-CM

## 2023-02-09 DIAGNOSIS — D50.9 IRON DEFICIENCY ANEMIA, UNSPECIFIED IRON DEFICIENCY ANEMIA TYPE: ICD-10-CM

## 2023-02-09 DIAGNOSIS — N92.0 MENORRHAGIA WITH REGULAR CYCLE: ICD-10-CM

## 2023-02-09 RX ORDER — SODIUM CHLORIDE 9 MG/ML
20 INJECTION, SOLUTION INTRAVENOUS ONCE
OUTPATIENT
Start: 2023-02-22

## 2023-02-09 NOTE — TELEPHONE ENCOUNTER
While we try to accommodate patient requests, our priority is to schedule treatment according to Doctor's orders and site availability  1  Does the Provider use the intake sheet or checkout note? NO  2  What would be a preferred day of the week that would work best for your infusion appointment? FRIDAYS  3  Do you prefer mornings or afternoons for your appointments? AFTERNOON  4  Are there any days or dates that do not work for your schedule, including any upcoming vacations? NO  5  We are going to try our best to schedule you at the infusion center closest to your home  In the event that we are unable to what would be your next preferred infusion site or sites? 1  [place]  2  [place]    6  Do you have transportation to take you to all of your appointments? YES  7   Would you like the infusion center to draw labs from your port? (disregard if patient doesn't have a port or need labs for infusion appointment) N/A

## 2023-02-09 NOTE — TELEPHONE ENCOUNTER
Left message on answering machine with [facility] infusion appt, patient was provided with my teams number to return my call

## 2023-02-09 NOTE — PROGRESS NOTES
[address] [facility] [place]  [address] [facility] Jaylin Jaffe [facility] [address] [phone]  Hematology Ambulatory Follow-Up  iDane Mendez, 1975, [id number]  2/9/2023    Assessment/Plan:    1  Vitamin B 12 deficiency  2  Menorrhagia with regular cycle  3  Iron deficiency anemia, unspecified iron deficiency anemia type   Patient is a 71-year-old female with a history of menorrhagia and iron deficiency  Despite endometrial ablation in January 2022 she continues to have heavy periods  We had made arrangements for Venofer infusions and B12 injections, completed in August 2022  She tolerated without difficulty  CBC from 1/7/2023 was within normal limits, iron saturation 15% however ferritin was 8  She reports increased fatigue  We will make arrangements for repeat infusions  Vitamin B12 was normal at 463  We will monitor 3-month interval labs and see her in 6 months  Patient verbalized understanding and is in agreement with the plan  - CBC; Standing  - Iron Panel (Includes Ferritin, Iron Sat%, Iron, and TIBC); Standing  - CBC  - Iron Panel (Includes Ferritin, Iron Sat%, Iron, and TIBC)    Barrier(s) to care: None  The patient is able to self care  [address] [place] [address]    Interval history: clinically stable    Review of Systems   Constitutional: Positive for fatigue  Negative for activity change, appetite change, fever and unexpected weight change  Respiratory: Negative for cough and shortness of breath  Cardiovascular: Negative for chest pain and leg swelling  Gastrointestinal: Negative for abdominal pain, constipation, diarrhea and nausea  Endocrine: Negative for cold intolerance and heat intolerance  Musculoskeletal: Negative for arthralgias and myalgias  Skin: Negative  Neurological: Negative for dizziness, weakness and headaches  Hematological: Negative for adenopathy  Does not bruise/bleed easily  Past Medical History:   Diagnosis Date   • Anemia    • Back pain, acute    • Gastritis    • GERD (gastroesophageal reflux disease)    • History of transfusion 08/2021   • Non-cardiac chest pain    • Shortness of breath     with low hgb     Past Surgical History:   Procedure Laterality Date   • EGD     • ENDOMETRIAL ABLATION N/A 1/21/2022    Procedure: HYSTEROSCOPY ABLATION ENDOMETRIAL Krystin Rucker;  Surgeon: Oneil Garcia MD;  [facility];  Service: Gynecology   • NJ HYSTEROSCOPY BX ENDOMETRIUM&/POLYPC W/WO D&C N/A 1/21/2022    Procedure: DILATATION AND CURETTAGE (D&C) WITH HYSTEROSCOPY;  Surgeon: Oneil Garcia MD;  [facility];  Service: Gynecology   • TUBAL LIGATION         Current Outpatient Medications:   •  acetaminophen (TYLENOL) 500 mg tablet, Take 500 mg by mouth every 6 (six) hours as needed for mild pain, Disp: , Rfl:     No Known Allergies    Objective:  /80 (BP Location: Left arm, Patient Position: Sitting, Cuff Size: Standard)   Pulse 80   Temp (!) 97 3 °F (36 3 °C) (Temporal)   Ht 5' 6" (1 676 m)   Wt 79 4 kg (175 lb)   SpO2 100%   BMI 28 25 kg/m²    Physical Exam  Vitals reviewed  Constitutional:       Appearance: Normal appearance  She is well-developed  HENT:      Head: Normocephalic and atraumatic  Eyes:      Conjunctiva/sclera: Conjunctivae normal       Pupils: Pupils are equal, round, and reactive to light  Pulmonary:      Effort: Pulmonary effort is normal  No respiratory distress  Musculoskeletal:         General: Normal range of motion  Cervical back: Normal range of motion  Lymphadenopathy:      Cervical: No cervical adenopathy  Skin:     General: Skin is dry  Neurological:      Mental Status: She is alert and oriented to person, place, and time  Psychiatric:         Behavior: Behavior normal      Result Review  Labs:  No visits with results within 1 Month(s) from this visit     Latest known visit with results is:   Appointment on 01/07/2023   Component Date Value Ref Range Status   • WBC 01/07/2023 6 30  4 31 - 10 16 Thousand/uL Final   • RBC 01/07/2023 4 56  3 81 - 5 12 Million/uL Final   • Hemoglobin 01/07/2023 12 7  11 5 - 15 4 g/dL Final   • Hematocrit 01/07/2023 39 9  34 8 - 46 1 % Final   • MCV 01/07/2023 88  82 - 98 fL Final   • MCH 01/07/2023 27 9  26 8 - 34 3 pg Final   • MCHC 01/07/2023 31 8  31 4 - 37 4 g/dL Final   • RDW 01/07/2023 12 8  11 6 - 15 1 % Final   • MPV 01/07/2023 9 5  8 9 - 12 7 fL Final   • Platelets 63/69/2816 370  149 - 390 Thousands/uL Final   • nRBC 01/07/2023 0  /100 WBCs Final   • Neutrophils Relative 01/07/2023 65  43 - 75 % Final   • Immat GRANS % 01/07/2023 0  0 - 2 % Final   • Lymphocytes Relative 01/07/2023 22  14 - 44 % Final   • Monocytes Relative 01/07/2023 9  4 - 12 % Final   • Eosinophils Relative 01/07/2023 3  0 - 6 % Final   • Basophils Relative 01/07/2023 1  0 - 1 % Final   • Neutrophils Absolute 01/07/2023 4 15  1 85 - 7 62 Thousands/µL Final   • Immature Grans Absolute 01/07/2023 0 01  0 00 - 0 20 Thousand/uL Final   • Lymphocytes Absolute 01/07/2023 1 37  0 60 - 4 47 Thousands/µL Final   • Monocytes Absolute 01/07/2023 0 54  0 17 - 1 22 Thousand/µL Final   • Eosinophils Absolute 01/07/2023 0 19  0 00 - 0 61 Thousand/µL Final   • Basophils Absolute 01/07/2023 0 04  0 00 - 0 10 Thousands/µL Final   • Vitamin B-12 01/07/2023 463  100 - 900 pg/mL Final   • Sodium 01/07/2023 139  135 - 147 mmol/L Final   • Potassium 01/07/2023 4 0  3 5 - 5 3 mmol/L Final   • Chloride 01/07/2023 105  96 - 108 mmol/L Final   • CO2 01/07/2023 29  21 - 32 mmol/L Final   • ANION GAP 01/07/2023 5  4 - 13 mmol/L Final   • BUN 01/07/2023 8  5 - 25 mg/dL Final   • Creatinine 01/07/2023 0 54 (L)  0 60 - 1 30 mg/dL Final    Standardized to IDMS reference method   • Glucose, Fasting 01/07/2023 106 (H)  65 - 99 mg/dL Final    Specimen collection should occur prior to Sulfasalazine administration due to the potential for falsely depressed results  Specimen collection should occur prior to Sulfapyridine administration due to the potential for falsely elevated results  • Calcium 01/07/2023 9 2  8 3 - 10 1 mg/dL Final   • AST 01/07/2023 11  5 - 45 U/L Final    Specimen collection should occur prior to Sulfasalazine administration due to the potential for falsely depressed results  • ALT 01/07/2023 17  12 - 78 U/L Final    Specimen collection should occur prior to Sulfasalazine and/or Sulfapyridine administration due to the potential for falsely depressed results  • Alkaline Phosphatase 01/07/2023 69  46 - 116 U/L Final   • Total Protein 01/07/2023 7 6  6 4 - 8 4 g/dL Final   • Albumin 01/07/2023 3 5  3 5 - 5 0 g/dL Final   • Total Bilirubin 01/07/2023 0 50  0 20 - 1 00 mg/dL Final    Use of this assay is not recommended for patients undergoing treatment with eltrombopag due to the potential for falsely elevated results  • eGFR 01/07/2023 112  ml/min/1 73sq m Final   • Iron Saturation 01/07/2023 15  15 - 50 % Final   • TIBC 01/07/2023 312  250 - 450 ug/dL Final   • Iron 01/07/2023 46 (L)  50 - 170 ug/dL Final    Patients treated with metal-binding drugs (ie  Deferoxamine) may have depressed iron values  • Ferritin 01/07/2023 8  8 - 388 ng/mL Final     Please note: This report has been generated by a voice recognition software system  Therefore there may be syntax, spelling, and/or grammatical errors  Please call if you have any questions

## 2023-02-10 DIAGNOSIS — N92.0 MENORRHAGIA WITH REGULAR CYCLE: ICD-10-CM

## 2023-02-10 DIAGNOSIS — E53.8 VITAMIN B 12 DEFICIENCY: Primary | ICD-10-CM

## 2023-02-10 DIAGNOSIS — D50.9 IRON DEFICIENCY ANEMIA, UNSPECIFIED IRON DEFICIENCY ANEMIA TYPE: ICD-10-CM

## 2023-03-03 ENCOUNTER — HOSPITAL ENCOUNTER (OUTPATIENT)
Dept: INFUSION CENTER | Facility: HOSPITAL | Age: 48
End: 2023-03-03
Attending: INTERNAL MEDICINE

## 2023-03-03 VITALS
SYSTOLIC BLOOD PRESSURE: 143 MMHG | RESPIRATION RATE: 18 BRPM | TEMPERATURE: 97 F | DIASTOLIC BLOOD PRESSURE: 85 MMHG | HEART RATE: 77 BPM

## 2023-03-03 DIAGNOSIS — D50.9 IRON DEFICIENCY ANEMIA, UNSPECIFIED IRON DEFICIENCY ANEMIA TYPE: ICD-10-CM

## 2023-03-03 DIAGNOSIS — E53.8 VITAMIN B 12 DEFICIENCY: Primary | ICD-10-CM

## 2023-03-03 DIAGNOSIS — N92.0 MENORRHAGIA WITH REGULAR CYCLE: ICD-10-CM

## 2023-03-03 RX ORDER — SODIUM CHLORIDE 9 MG/ML
20 INJECTION, SOLUTION INTRAVENOUS ONCE
Status: CANCELLED | OUTPATIENT
Start: 2023-03-10

## 2023-03-03 RX ORDER — SODIUM CHLORIDE 9 MG/ML
20 INJECTION, SOLUTION INTRAVENOUS ONCE
Status: COMPLETED | OUTPATIENT
Start: 2023-03-03 | End: 2023-03-03

## 2023-03-03 RX ADMIN — SODIUM CHLORIDE 20 ML/HR: 0.9 INJECTION, SOLUTION INTRAVENOUS at 12:30

## 2023-03-03 RX ADMIN — IRON SUCROSE 300 MG: 20 INJECTION, SOLUTION INTRAVENOUS at 12:30

## 2023-03-10 ENCOUNTER — HOSPITAL ENCOUNTER (OUTPATIENT)
Dept: INFUSION CENTER | Facility: HOSPITAL | Age: 48
End: 2023-03-10
Attending: INTERNAL MEDICINE

## 2023-03-10 VITALS
SYSTOLIC BLOOD PRESSURE: 123 MMHG | RESPIRATION RATE: 16 BRPM | TEMPERATURE: 97.4 F | HEART RATE: 72 BPM | DIASTOLIC BLOOD PRESSURE: 78 MMHG

## 2023-03-10 DIAGNOSIS — E53.8 VITAMIN B 12 DEFICIENCY: Primary | ICD-10-CM

## 2023-03-10 DIAGNOSIS — D50.9 IRON DEFICIENCY ANEMIA, UNSPECIFIED IRON DEFICIENCY ANEMIA TYPE: ICD-10-CM

## 2023-03-10 DIAGNOSIS — N92.0 MENORRHAGIA WITH REGULAR CYCLE: ICD-10-CM

## 2023-03-10 RX ORDER — SODIUM CHLORIDE 9 MG/ML
20 INJECTION, SOLUTION INTRAVENOUS ONCE
Status: CANCELLED | OUTPATIENT
Start: 2023-03-17

## 2023-03-10 RX ORDER — SODIUM CHLORIDE 9 MG/ML
20 INJECTION, SOLUTION INTRAVENOUS ONCE
Status: COMPLETED | OUTPATIENT
Start: 2023-03-10 | End: 2023-03-10

## 2023-03-10 RX ADMIN — SODIUM CHLORIDE 20 ML/HR: 0.9 INJECTION, SOLUTION INTRAVENOUS at 13:10

## 2023-03-10 RX ADMIN — IRON SUCROSE 300 MG: 20 INJECTION, SOLUTION INTRAVENOUS at 13:12

## 2023-03-17 ENCOUNTER — HOSPITAL ENCOUNTER (OUTPATIENT)
Dept: INFUSION CENTER | Facility: HOSPITAL | Age: 48
End: 2023-03-17
Attending: INTERNAL MEDICINE

## 2023-03-17 VITALS
TEMPERATURE: 97.4 F | HEART RATE: 77 BPM | DIASTOLIC BLOOD PRESSURE: 86 MMHG | RESPIRATION RATE: 18 BRPM | SYSTOLIC BLOOD PRESSURE: 144 MMHG

## 2023-03-17 DIAGNOSIS — D50.9 IRON DEFICIENCY ANEMIA, UNSPECIFIED IRON DEFICIENCY ANEMIA TYPE: ICD-10-CM

## 2023-03-17 DIAGNOSIS — E53.8 VITAMIN B 12 DEFICIENCY: Primary | ICD-10-CM

## 2023-03-17 DIAGNOSIS — N92.0 MENORRHAGIA WITH REGULAR CYCLE: ICD-10-CM

## 2023-03-17 RX ORDER — SODIUM CHLORIDE 9 MG/ML
20 INJECTION, SOLUTION INTRAVENOUS ONCE
Status: CANCELLED | OUTPATIENT
Start: 2023-03-17

## 2023-03-17 RX ORDER — SODIUM CHLORIDE 9 MG/ML
20 INJECTION, SOLUTION INTRAVENOUS ONCE
Status: COMPLETED | OUTPATIENT
Start: 2023-03-17 | End: 2023-03-17

## 2023-03-17 RX ADMIN — IRON SUCROSE 300 MG: 20 INJECTION, SOLUTION INTRAVENOUS at 13:01

## 2023-03-17 RX ADMIN — SODIUM CHLORIDE 20 ML/HR: 0.9 INJECTION, SOLUTION INTRAVENOUS at 13:01

## 2023-04-02 ENCOUNTER — APPOINTMENT (OUTPATIENT)
Dept: LAB | Facility: CLINIC | Age: 48
End: 2023-04-02

## 2023-04-02 LAB
ERYTHROCYTE [DISTWIDTH] IN BLOOD BY AUTOMATED COUNT: 16.6 % (ref 11.6–15.1)
FERRITIN SERPL-MCNC: 113 NG/ML (ref 8–388)
HCT VFR BLD AUTO: 44.8 % (ref 34.8–46.1)
HGB BLD-MCNC: 14.1 G/DL (ref 11.5–15.4)
MCH RBC QN AUTO: 26.6 PG (ref 26.8–34.3)
MCHC RBC AUTO-ENTMCNC: 31.5 G/DL (ref 31.4–37.4)
MCV RBC AUTO: 84 FL (ref 82–98)
PLATELET # BLD AUTO: 353 THOUSANDS/UL (ref 149–390)
PMV BLD AUTO: 9.8 FL (ref 8.9–12.7)
RBC # BLD AUTO: 5.31 MILLION/UL (ref 3.81–5.12)
WBC # BLD AUTO: 6.11 THOUSAND/UL (ref 4.31–10.16)

## 2023-04-06 LAB
IRON SATN MFR SERPL: 21 % (ref 15–50)
IRON SERPL-MCNC: 61 UG/DL (ref 50–170)
TIBC SERPL-MCNC: 285 UG/DL (ref 250–450)

## 2023-04-24 ENCOUNTER — OFFICE VISIT (OUTPATIENT)
Dept: DENTISTRY | Facility: CLINIC | Age: 48
End: 2023-04-24

## 2023-04-24 VITALS — HEART RATE: 73 BPM | TEMPERATURE: 98.8 F | DIASTOLIC BLOOD PRESSURE: 90 MMHG | SYSTOLIC BLOOD PRESSURE: 161 MMHG

## 2023-04-24 DIAGNOSIS — K02.9 RECURRENT DENTAL CARIES EXTENDING INTO DENTIN: Primary | ICD-10-CM

## 2023-04-24 NOTE — PROGRESS NOTES
Composite Filling    Tj Carballo presents for composite filling #19MO due to slight recurrent decay and lack of contact/food impaction  PMH reviewed, no changes  ASA 2, pain 0  Discussed with patient need for RCT if pulp exposure occurs or in future if pulp is inflamed  Pt understands and consents  Applied topical benzocaine, administered 1 carp 4% septocaine 1:100k epi via buccal infiltration  Prepped tooth #19 MO with 245 carbide on high speed  Caries removed with round carbide on slow speed  Placed munguia matrix  Isolation with cotton rolls and dri-angles  Etch with 37% H2PO4, rinse, dry  Applied Adhese with 20 second scrub once, gentle air dry and light cured for 10s  Restored with Tetric bulk kiara shade A2 and light cured  Refined with finishing burs, polished with enhance point  Verified occlusion and contacts  Pt left satisfied      NV: crown on #7 at pt request due to gold crown - sent for pre-auth today

## 2023-06-05 ENCOUNTER — OFFICE VISIT (OUTPATIENT)
Dept: OBGYN CLINIC | Facility: CLINIC | Age: 48
End: 2023-06-05

## 2023-06-05 VITALS
DIASTOLIC BLOOD PRESSURE: 104 MMHG | WEIGHT: 176 LBS | BODY MASS INDEX: 28.28 KG/M2 | SYSTOLIC BLOOD PRESSURE: 171 MMHG | HEIGHT: 66 IN

## 2023-06-05 DIAGNOSIS — R10.2 PELVIC PAIN: Primary | ICD-10-CM

## 2023-06-05 DIAGNOSIS — N76.0 BV (BACTERIAL VAGINOSIS): ICD-10-CM

## 2023-06-05 DIAGNOSIS — B96.89 BV (BACTERIAL VAGINOSIS): ICD-10-CM

## 2023-06-05 DIAGNOSIS — Z11.3 SCREEN FOR STD (SEXUALLY TRANSMITTED DISEASE): ICD-10-CM

## 2023-06-05 LAB
BV WHIFF TEST VAG QL: POSITIVE
CLUE CELLS SPEC QL WET PREP: POSITIVE
PH SMN: 5.5 [PH]
SL AMB POCT WET MOUNT: ABNORMAL
T VAGINALIS VAG QL WET PREP: NEGATIVE
YEAST VAG QL WET PREP: NEGATIVE

## 2023-06-05 PROCEDURE — 99213 OFFICE O/P EST LOW 20 MIN: CPT | Performed by: NURSE PRACTITIONER

## 2023-06-05 PROCEDURE — 87210 SMEAR WET MOUNT SALINE/INK: CPT | Performed by: NURSE PRACTITIONER

## 2023-06-05 PROCEDURE — 87591 N.GONORRHOEAE DNA AMP PROB: CPT | Performed by: NURSE PRACTITIONER

## 2023-06-05 PROCEDURE — 87491 CHLMYD TRACH DNA AMP PROBE: CPT | Performed by: NURSE PRACTITIONER

## 2023-06-05 RX ORDER — METRONIDAZOLE 500 MG/1
500 TABLET ORAL 2 TIMES DAILY
Qty: 14 TABLET | Refills: 0 | Status: SHIPPED | OUTPATIENT
Start: 2023-06-05 | End: 2023-06-12

## 2023-06-05 NOTE — PROGRESS NOTES
PROBLEM GYNECOLOGICAL VISIT    Bre Parr is a 50 y o  female who presents today with complaint of vaginal discharge  Her general medical history has been reviewed and she reports it as follows:    Past Medical History:   Diagnosis Date   • Anemia    • Back pain, acute    • Gastritis    • GERD (gastroesophageal reflux disease)    • History of transfusion 2021   • Non-cardiac chest pain    • Shortness of breath     with low hgb     Past Surgical History:   Procedure Laterality Date   • EGD     • ENDOMETRIAL ABLATION N/A 2022    Procedure: HYSTEROSCOPY ABLATION ENDOMETRIAL Touchet Mirza;  Surgeon: Lizzie Washington MD;  [facility];  Service: Gynecology   • NE HYSTEROSCOPY BX ENDOMETRIUM&/POLYPC W/WO D&C N/A 2022    Procedure: DILATATION AND CURETTAGE (D&C) WITH HYSTEROSCOPY;  Surgeon: Lizzie Washington MD;  [facility];  Service: Gynecology   • TUBAL LIGATION       OB History        3    Para   3    Term   3            AB        Living   3       SAB        IAB        Ectopic        Multiple        Live Births                   Social History     Tobacco Use   • Smoking status: Never   • Smokeless tobacco: Never   Vaping Use   • Vaping Use: Never used   Substance Use Topics   • Alcohol use: Never   • Drug use: Never     Social History     Substance and Sexual Activity   Sexual Activity Yes   • Partners: Male   • Birth control/protection: Female Sterilization       Current Outpatient Medications   Medication Instructions   • acetaminophen (TYLENOL) 500 mg, Oral, As needed   • metroNIDAZOLE (FLAGYL) 500 mg, Oral, 2 times daily       History of Present Illness:   Joon Heck presents today with complaints of vaginal discharge, odor and pelvic pain  She is reporting vaginal discharge for about one and a half months  Discharge was initially yellow and has changed to off white and watery  She does have an odor  She denies any itching or irritation   She states that since the "discharge has started she has started to develop intermittent, sharp, left sided pelvic pain  Review of Systems:  Review of Systems   Constitutional: Negative  Gastrointestinal: Negative  Genitourinary: Positive for pelvic pain and vaginal discharge  Physical Exam:  BP (!) 171/104 (BP Location: Left arm, Patient Position: Sitting, Cuff Size: Adult)   Ht 5' 6\" (1 676 m)   Wt 79 8 kg (176 lb)   LMP 05/19/2023 (Exact Date)   BMI 28 41 kg/m²   Physical Exam  Constitutional:       General: She is not in acute distress  Appearance: Normal appearance  Genitourinary:      Vulva normal       No lesions in the vagina  Vaginal discharge present  Right Adnexa: not tender and no mass present  Left Adnexa: not tender and no mass present  No cervical motion tenderness  Neurological:      Mental Status: She is alert  Skin:     General: Skin is warm and dry  Psychiatric:         Mood and Affect: Mood normal          Behavior: Behavior normal    Vitals reviewed  Point of Care Testing:   -Wet mount: +clue cells, -yeast, -trich   -KOH mount: -yeast   -Whiff: positive    -pH 5 5    Assessment:   1  Bacterial vaginosis    2  STI screening    3  Pelvic pain     Plan:   1  Rx for [facility]  2  Reviewed vulvar skin care measures    3  GC/CT culture collected  4  Pelvic pain possibly related to vaginitis  Will have pelvic US completed  5  Return for annual exam      Reviewed with patient that test results are available in MyChart immediately, but that they will not necessarily be reviewed by me immediately  Explained that I will review results at my earliest opportunity and contact patient appropriately    "

## 2023-06-06 LAB
C TRACH DNA SPEC QL NAA+PROBE: NEGATIVE
N GONORRHOEA DNA SPEC QL NAA+PROBE: NEGATIVE

## 2023-06-13 ENCOUNTER — HOSPITAL ENCOUNTER (OUTPATIENT)
Dept: RADIOLOGY | Facility: HOSPITAL | Age: 48
Discharge: HOME | End: 2023-06-13
Payer: COMMERCIAL

## 2023-06-13 DIAGNOSIS — R10.2 PELVIC PAIN: ICD-10-CM

## 2023-06-13 PROCEDURE — 76830 TRANSVAGINAL US NON-OB: CPT

## 2023-06-13 PROCEDURE — 76856 US EXAM PELVIC COMPLETE: CPT

## 2023-06-22 ENCOUNTER — OFFICE VISIT (OUTPATIENT)
Dept: INTERNAL MEDICINE CLINIC | Facility: CLINIC | Age: 48
End: 2023-06-22

## 2023-06-22 VITALS
HEART RATE: 76 BPM | SYSTOLIC BLOOD PRESSURE: 151 MMHG | BODY MASS INDEX: 28.77 KG/M2 | DIASTOLIC BLOOD PRESSURE: 88 MMHG | WEIGHT: 179 LBS | TEMPERATURE: 98.1 F | HEIGHT: 66 IN

## 2023-06-22 DIAGNOSIS — J01.00 ACUTE NON-RECURRENT MAXILLARY SINUSITIS: ICD-10-CM

## 2023-06-22 DIAGNOSIS — R42 VERTIGO: ICD-10-CM

## 2023-06-22 DIAGNOSIS — K21.9 GASTROESOPHAGEAL REFLUX DISEASE, UNSPECIFIED WHETHER ESOPHAGITIS PRESENT: ICD-10-CM

## 2023-06-22 DIAGNOSIS — I10 PRIMARY HYPERTENSION: ICD-10-CM

## 2023-06-22 DIAGNOSIS — G47.00 INSOMNIA, UNSPECIFIED TYPE: ICD-10-CM

## 2023-06-22 DIAGNOSIS — N32.81 OVERACTIVE BLADDER: ICD-10-CM

## 2023-06-22 DIAGNOSIS — R53.83 FATIGUE, UNSPECIFIED TYPE: Primary | ICD-10-CM

## 2023-06-22 RX ORDER — FLUTICASONE PROPIONATE 50 MCG
1 SPRAY, SUSPENSION (ML) NASAL DAILY
Qty: 15.8 ML | Refills: 1 | Status: SHIPPED | OUTPATIENT
Start: 2023-06-22

## 2023-06-22 RX ORDER — FAMOTIDINE 20 MG/1
20 TABLET, FILM COATED ORAL 2 TIMES DAILY
Qty: 180 TABLET | Refills: 3 | Status: SHIPPED | OUTPATIENT
Start: 2023-06-22 | End: 2024-06-16

## 2023-06-22 NOTE — PROGRESS NOTES
[address] [facility]  INTERNAL MEDICINE OFFICE VISIT     PATIENT INFORMATION     Norma Olmos   50 y o  female   MRN: [id number]    ASSESSMENT/PLAN     Diagnoses and all orders for this visit:    Fatigue, unspecified type  -     TSH, 3rd generation with Free T4 reflex; Future    Insomnia, unspecified type  -     TSH, 3rd generation with Free T4 reflex; Future    Acute non-recurrent maxillary sinusitis  -     fluticasone (FLONASE) 50 mcg/act nasal spray; 1 spray into each nostril daily    Gastroesophageal reflux disease, unspecified whether esophagitis present  -     famotidine (PEPCID) 20 mg tablet; Take 1 tablet (20 mg total) by mouth 2 (two) times a day  -     H  pylori antigen, stool; Future    Vertigo  -     Basic vestibular eval; Future    Primary hypertension    Overactive bladder      1  Dizziness, vertigo secondary to likely BPPV  Orthostatic posture and Romberg negative, Parker Hallpike positive  Admits to low water intake    -Vestibular therapy, Epley maneuver   -Encouraged hydration    2  Fatigue, insomnia  Last Hgb normal, previous iron def anemia with memorrhagia    -TSH ordered  -OTC melatonin recommended  -Discussed sleep hygiene    3  Epigastric pain and reflux likely secondary to GERD    -Start pepcid   -H pylori stool testing    4  Nocturia likely secondary to overactive bladder  Patient has no incontinence, goes to bathroom 3 times per night, decreased water intake    -Monitor    5  HTN  Elevated in past visit, does not snore and denies apnea    -Discussed lifestyle modification  -Will consider starting medication for annual physical visit, and workup of secondary HTN     6   Sinusitis  Ongoing since recent URI    -Flonase PRN    Schedule a follow-up appointment in 3 months for annual physical        HEALTH MAINTENANCE     Immunization History   Administered Date(s) Administered   • Tdap 04/11/2016, 04/11/2016     CHIEF COMPLAINT     Chief Complaint   Patient "presents with   • Follow-up     2 weeks ago- dizziness, fatigue -abdominal pain        HISTORY OF PRESENT ILLNESS   Raimundo Chiang is a 50 yr old F with no relevant PMH who presents with 2 weeks of dizziness, fatigue, and abd pain  Patient reports one month ago had congestion and URI then 2 weeks ago had dizziness, tiredness, and fatigue  Ongoing sinus pressure from cold  Denies fevers, chills, or weight change  Admits to poor sleep with nocturia, wakes up about 3 times per night to urinate  Sleep 5 hours of sleep per night  Unrested sleep, takes more naps during the day  Denies depression or leg twitching  Admits to epigastric abd pain with reflux which occurs only when eating  Consumes fatty and fried foods  LFTs WNL in January  A1C 5 3 in 2022  REVIEW OF SYSTEMS     Review of Systems   All other systems reviewed and are negative  OBJECTIVE     Vitals:    06/22/23 1625   BP: 151/88   BP Location: Right arm   Patient Position: Sitting   Cuff Size: Large   Pulse: 76   Temp: 98 1 °F (36 7 °C)   TempSrc: Temporal   Weight: 81 2 kg (179 lb)   Height: 5' 6\" (1 676 m)     Physical Exam  Vitals reviewed  Constitutional:       General: She is not in acute distress  HENT:      Head: Normocephalic and atraumatic  Mouth/Throat:      Pharynx: Oropharynx is clear  Eyes:      Extraocular Movements: Extraocular movements intact  Conjunctiva/sclera: Conjunctivae normal       Pupils: Pupils are equal, round, and reactive to light  Cardiovascular:      Rate and Rhythm: Normal rate and regular rhythm  Pulses: Normal pulses  Heart sounds: Normal heart sounds  Pulmonary:      Effort: Pulmonary effort is normal       Breath sounds: Normal breath sounds  Abdominal:      General: Bowel sounds are normal  There is no distension  Palpations: Abdomen is soft  There is no mass  Tenderness: There is abdominal tenderness (epigastric on deep palpation)     Musculoskeletal:         " General: No tenderness  Normal range of motion  Cervical back: Normal range of motion and neck supple  Right lower leg: No edema  Left lower leg: No edema  Neurological:      Mental Status: She is alert and oriented to person, place, and time  Motor: No weakness        Comments: Juana Hallpike positive  Romberg negative  No symptomatic orthostasis        CURRENT MEDICATIONS     Current Outpatient Medications:   •  acetaminophen (TYLENOL) 500 mg tablet, Take 500 mg by mouth if needed for mild pain, Disp: , Rfl:   •  famotidine (PEPCID) 20 mg tablet, Take 1 tablet (20 mg total) by mouth 2 (two) times a day, Disp: 180 tablet, Rfl: 3  •  fluticasone (FLONASE) 50 mcg/act nasal spray, 1 spray into each nostril daily, Disp: 15 8 mL, Rfl: 1    PAST MEDICAL & SURGICAL HISTORY     Past Medical History:   Diagnosis Date   • Anemia    • Back pain, acute    • Gastritis    • GERD (gastroesophageal reflux disease)    • History of transfusion 08/2021   • Non-cardiac chest pain    • Shortness of breath     with low hgb     Past Surgical History:   Procedure Laterality Date   • EGD     • ENDOMETRIAL ABLATION N/A 1/21/2022    Procedure: Birder Goodell;  Surgeon: Anthony Rubio MD;  [facility];  Service: Gynecology   • [place] HYSTEROSCOPY BX ENDOMETRIUM&/POLYPC W/WO D&C N/A 1/21/2022    Procedure: DILATATION AND CURETTAGE (D&C) WITH HYSTEROSCOPY;  Surgeon: Anthony Rubio MD;  [facility];  Service: Gynecology   • TUBAL LIGATION       SOCIAL & FAMILY HISTORY     Social History     Socioeconomic History   • Marital status: Single     Spouse name: Not on file   • Number of children: Not on file   • Years of education: Not on file   • Highest education level: Not on file   Occupational History   • Not on file   Tobacco Use   • Smoking status: Never   • Smokeless tobacco: Never   Vaping Use   • Vaping Use: Never used   Substance and Sexual Activity   • Alcohol use: Never   • Drug use: Never   • Sexual activity: Yes     Partners: Male     Birth control/protection: Female Sterilization   Other Topics Concern   • Not on file   Social History Narrative   • Not on file     Social Determinants of Health     Financial Resource Strain: Low Risk  (6/22/2023)    Overall Financial Resource Strain (CARDIA)    • Difficulty of Paying Living Expenses: Not hard at all   Food Insecurity: No Food Insecurity (6/22/2023)    Hunger Vital Sign    • Worried About Running Out of Food in the Last Year: Never true    • Ran Out of Food in the Last Year: Never true   Transportation Needs: No Transportation Needs (6/22/2023)    PRAPARE - Transportation    • Lack of Transportation (Medical): No    • Lack of Transportation (Non-Medical):  No   Physical Activity: Inactive (7/14/2021)    Exercise Vital Sign    • Days of Exercise per Week: 0 days    • Minutes of Exercise per Session: 0 min   Stress: No Stress Concern Present (7/14/2021)    [address]    • Feeling of Stress : Not at all   Social Connections: Socially Isolated (7/14/2021)    Social Connection and Isolation Panel [NHANES]    • Frequency of Communication with Friends and Family: More than three times a week    • Frequency of Social Gatherings with Friends and Family: More than three times a week    • Attends Yazdanism Services: Never    • Active Member of Clubs or Organizations: No    • Attends Club or Organization Meetings: Never    • Marital Status: Never    Intimate Partner Violence: Not At Risk (7/14/2021)    Humiliation, Afraid, Rape, and Kick questionnaire    • Fear of Current or Ex-Partner: No    • Emotionally Abused: No    • Physically Abused: No    • Sexually Abused: No   Housing Stability: Unknown (7/14/2021)    Housing Stability Vital Sign    • Unable to Pay for Housing in the Last Year: No    • Number of Places Lived in the Last Year: Not on file    • Unstable Housing in the Last Year: No     Social History     Substance and Sexual Activity   Alcohol Use Never     Substance and Sexual Activity   Alcohol Use Never        Substance and Sexual Activity   Drug Use Never     Social History     Tobacco Use   Smoking Status Never   Smokeless Tobacco Never     Family History   Problem Relation Age of Onset   • Hypertension Mother    • Stroke Mother    • No Known Problems Father    • Hypertension Maternal Grandmother    • Stroke Maternal Grandmother    • Hypertension Maternal Uncle    • Stroke Maternal Uncle    • Stroke Paternal Aunt    • No Known Problems Sister    • HIV Brother    • No Known Problems Daughter    • No Known Problems Son    • No Known Problems Maternal Grandfather    • No Known Problems Paternal Grandmother    • No Known Problems Paternal Grandfather    • No Known Problems Sister    • No Known Problems Daughter                ==  Elke Huang MD  PGY-1  [address] Internal Medicine [address] , [address] [address] [facility], [address] [place] [address]  Office: [phone]  Fax: [phone]

## 2023-06-25 ENCOUNTER — APPOINTMENT (OUTPATIENT)
Dept: LAB | Facility: CLINIC | Age: 48
End: 2023-06-25
Payer: COMMERCIAL

## 2023-06-25 DIAGNOSIS — R53.83 FATIGUE, UNSPECIFIED TYPE: ICD-10-CM

## 2023-06-25 DIAGNOSIS — G47.00 INSOMNIA, UNSPECIFIED TYPE: ICD-10-CM

## 2023-06-25 LAB — TSH SERPL DL<=0.05 MIU/L-ACNC: 0.91 UIU/ML (ref 0.45–4.5)

## 2023-06-25 PROCEDURE — 84443 ASSAY THYROID STIM HORMONE: CPT

## 2023-07-11 PROBLEM — R10.13 EPIGASTRIC PAIN: Status: RESOLVED | Noted: 2020-09-14 | Resolved: 2023-07-11

## 2023-07-11 PROBLEM — D64.9 SYMPTOMATIC ANEMIA: Status: RESOLVED | Noted: 2020-09-20 | Resolved: 2023-07-11

## 2023-07-11 PROBLEM — R11.0 NAUSEA: Status: RESOLVED | Noted: 2020-09-14 | Resolved: 2023-07-11

## 2023-07-11 PROBLEM — R10.2 PELVIC PAIN: Status: ACTIVE | Noted: 2023-07-11

## 2023-07-11 PROBLEM — E87.6 HYPOKALEMIA: Status: RESOLVED | Noted: 2020-09-21 | Resolved: 2023-07-11

## 2023-07-11 PROBLEM — M54.9 MID BACK PAIN: Status: RESOLVED | Noted: 2020-09-14 | Resolved: 2023-07-11

## 2023-08-09 ENCOUNTER — OFFICE VISIT (OUTPATIENT)
Dept: HEMATOLOGY ONCOLOGY | Facility: CLINIC | Age: 48
End: 2023-08-09
Payer: COMMERCIAL

## 2023-08-09 ENCOUNTER — TELEPHONE (OUTPATIENT)
Dept: HEMATOLOGY ONCOLOGY | Facility: CLINIC | Age: 48
End: 2023-08-09

## 2023-08-09 VITALS
HEIGHT: 66 IN | DIASTOLIC BLOOD PRESSURE: 90 MMHG | WEIGHT: 178 LBS | TEMPERATURE: 97.7 F | RESPIRATION RATE: 17 BRPM | BODY MASS INDEX: 28.61 KG/M2 | HEART RATE: 74 BPM | OXYGEN SATURATION: 100 % | SYSTOLIC BLOOD PRESSURE: 130 MMHG

## 2023-08-09 DIAGNOSIS — D50.9 IRON DEFICIENCY ANEMIA, UNSPECIFIED IRON DEFICIENCY ANEMIA TYPE: ICD-10-CM

## 2023-08-09 DIAGNOSIS — N92.0 MENORRHAGIA WITH REGULAR CYCLE: Primary | ICD-10-CM

## 2023-08-09 PROCEDURE — 99213 OFFICE O/P EST LOW 20 MIN: CPT | Performed by: NURSE PRACTITIONER

## 2023-08-09 RX ORDER — SODIUM CHLORIDE 9 MG/ML
20 INJECTION, SOLUTION INTRAVENOUS ONCE
OUTPATIENT
Start: 2023-08-23

## 2023-08-09 NOTE — PROGRESS NOTES
[facility] [address] [place] [address] [id number] [phone]  Hematology Ambulatory Follow-Up  Chantale Valladares, 1975, [id number]  8/9/2023    Assessment/Plan:    1. Menorrhagia with regular cycle  2. Iron deficiency anemia, unspecified iron deficiency anemia type   Patient is a 59-year-old Thai-speaking female with a history of menorrhagia, iron deficiency status post Venofer infusions and vitamin B12 injections in March 2023. Today's visit is assisted with [employer]  Jacki Dorian [id number]. CBC from 6/25/2023 showed a normal hemoglobin 12.4, MCV 85, iron saturation 10% with a ferritin level of 5. We discussed repeating iron infusions since she tolerated without difficulty. Given she has had several rounds of infusions we will make arrangements for maintenance infusions Venofer 300 mg IV once every 28 days. Vitamin B12 level in January 2023 was normal at 463. Advised her to start taking an oral iron such as Flintstones with iron once daily. Plan to see her in 6 months with labs done every 3 months. Patient understood via interpretation and is in agreement with plan. - CBC; Standing  - Iron Panel (Includes Ferritin, Iron Sat%, Iron, and TIBC); Standing  - CBC  - Iron Panel (Includes Ferritin, Iron Sat%, Iron, and TIBC)    Barrier(s) to care: None  The patient is able to self care. [facility]    Interval history: Clinically stable    Review of Systems   Constitutional: Positive for fatigue. Negative for activity change, appetite change, fever and unexpected weight change. Respiratory: Negative for cough and shortness of breath. Cardiovascular: Negative for chest pain and leg swelling. Gastrointestinal: Negative for abdominal pain, constipation, diarrhea and nausea. Endocrine: Negative for cold intolerance and heat intolerance. Musculoskeletal: Positive for myalgias. Negative for arthralgias. Skin: Negative. Neurological: Negative for dizziness, weakness and headaches. Hematological: Negative for adenopathy. Does not bruise/bleed easily. Past Medical History:   Diagnosis Date   • Anemia    • Back pain, acute    • Gastritis    • GERD (gastroesophageal reflux disease)    • History of transfusion 08/2021   • Non-cardiac chest pain    • Shortness of breath     with low hgb     Past Surgical History:   Procedure Laterality Date   • EGD     • ENDOMETRIAL ABLATION N/A 1/21/2022    Procedure: HYSTEROSCOPY ABLATION ENDOMETRIAL Dae Lee;  Surgeon: Audi Carpenter MD;  [facility];  Service: Gynecology   • OK HYSTEROSCOPY BX ENDOMETRIUM&/POLYPC W/WO D&C N/A 1/21/2022    Procedure: DILATATION AND CURETTAGE (D&C) WITH HYSTEROSCOPY;  Surgeon: Audi Carpenter MD;  [facility];  Service: Gynecology   • TUBAL LIGATION         Current Outpatient Medications:   •  acetaminophen (TYLENOL) 500 mg tablet, Take 500 mg by mouth if needed for mild pain, Disp: , Rfl:   •  famotidine (PEPCID) 20 mg tablet, Take 1 tablet (20 mg total) by mouth 2 (two) times a day (Patient not taking: Reported on 8/9/2023), Disp: 180 tablet, Rfl: 3  •  fluticasone (FLONASE) 50 mcg/act nasal spray, 1 spray into each nostril daily (Patient not taking: Reported on 8/9/2023), Disp: 15.8 mL, Rfl: 1    No Known Allergies    Objective:  /90 (BP Location: Left arm, Patient Position: Sitting, Cuff Size: Adult)   Pulse 74   Temp 97.7 °F (36.5 °C)   Resp 17   Ht 5' 6" (1.676 m)   Wt 80.7 kg (178 lb)   SpO2 100%   BMI 28.73 kg/m²    Physical Exam  Vitals reviewed. Constitutional:       Appearance: Normal appearance. She is well-developed. HENT:      Head: Normocephalic and atraumatic. Eyes:      Conjunctiva/sclera: Conjunctivae normal.      Pupils: Pupils are equal, round, and reactive to light.    Pulmonary:      Effort: Pulmonary effort is normal. No respiratory distress. Musculoskeletal:         General: Normal range of motion. Cervical back: Normal range of motion. Lymphadenopathy:      Cervical: No cervical adenopathy. Skin:     General: Skin is dry. Neurological:      Mental Status: She is alert and oriented to person, place, and time. Psychiatric:         Behavior: Behavior normal.     Result Review  Labs:  Appointment on 06/25/2023   Component Date Value Ref Range Status   • TSH 3RD GENERATON 06/25/2023 0.912  0.450 - 4.500 uIU/mL Final    The recommended reference ranges for TSH during pregnancy are as follows:   First trimester 0.1 to 2.5 uIU/mL   Second trimester  0.2 to 3.0 uIU/mL   Third trimester 0.3 to 3.0 uIU/m    Note: Normal ranges may not apply to patients who are transgender, non-binary, or whose legal sex, sex at birth, and gender identity differ. Adult TSH (3rd generation) reference range follows the recommended guidelines of the American Thyroid Association, January, 2020. Office Visit on 06/05/2023   Component Date Value Ref Range Status   • WET MOUNT 06/05/2023 abnormal   Final   • Yeast, Wet Prep 06/05/2023 negative   Final   • pH 06/05/2023 5.5   Final   • Whiff Test 06/05/2023 positive   Final   • Clue Cells 06/05/2023 positive   Final   • Trich, Wet Prep 06/05/2023 negative   Final   • N gonorrhoeae, DNA Probe 06/05/2023 Negative  Negative Final   • Chlamydia trachomatis, DNA Probe 06/05/2023 Negative  Negative Final     Please note: This report has been generated by a voice recognition software system. Therefore there may be syntax, spelling, and/or grammatical errors. Please call if you have any questions.

## 2023-09-01 ENCOUNTER — HOSPITAL ENCOUNTER (OUTPATIENT)
Dept: INFUSION CENTER | Facility: HOSPITAL | Age: 48
Discharge: HOME | End: 2023-09-01
Attending: INTERNAL MEDICINE

## 2023-09-22 ENCOUNTER — VBI (OUTPATIENT)
Dept: ADMINISTRATIVE | Facility: OTHER | Age: 48
End: 2023-09-22

## 2023-09-27 DIAGNOSIS — D50.9 IRON DEFICIENCY ANEMIA, UNSPECIFIED IRON DEFICIENCY ANEMIA TYPE: ICD-10-CM

## 2023-09-27 DIAGNOSIS — N92.0 MENORRHAGIA WITH REGULAR CYCLE: Primary | ICD-10-CM

## 2023-09-27 RX ORDER — SODIUM CHLORIDE 9 MG/ML
20 INJECTION, SOLUTION INTRAVENOUS ONCE
Status: CANCELLED | OUTPATIENT
Start: 2023-09-29

## 2023-09-29 ENCOUNTER — HOSPITAL ENCOUNTER (OUTPATIENT)
Dept: INFUSION CENTER | Facility: HOSPITAL | Age: 48
End: 2023-09-29
Attending: INTERNAL MEDICINE
Payer: COMMERCIAL

## 2023-09-29 VITALS
HEART RATE: 81 BPM | SYSTOLIC BLOOD PRESSURE: 141 MMHG | DIASTOLIC BLOOD PRESSURE: 84 MMHG | OXYGEN SATURATION: 99 % | TEMPERATURE: 98 F | RESPIRATION RATE: 19 BRPM

## 2023-09-29 DIAGNOSIS — D50.9 IRON DEFICIENCY ANEMIA, UNSPECIFIED IRON DEFICIENCY ANEMIA TYPE: ICD-10-CM

## 2023-09-29 DIAGNOSIS — N92.0 MENORRHAGIA WITH REGULAR CYCLE: Primary | ICD-10-CM

## 2023-09-29 LAB
ERYTHROCYTE [DISTWIDTH] IN BLOOD BY AUTOMATED COUNT: 14.1 % (ref 11.6–15.1)
FERRITIN SERPL-MCNC: 3 NG/ML (ref 11–307)
HCT VFR BLD AUTO: 38 % (ref 34.8–46.1)
HGB BLD-MCNC: 12 G/DL (ref 11.5–15.4)
IRON SATN MFR SERPL: 5 % (ref 15–50)
IRON SERPL-MCNC: 22 UG/DL (ref 50–212)
MCH RBC QN AUTO: 24.9 PG (ref 26.8–34.3)
MCHC RBC AUTO-ENTMCNC: 31.6 G/DL (ref 31.4–37.4)
MCV RBC AUTO: 79 FL (ref 82–98)
PLATELET # BLD AUTO: 364 THOUSANDS/UL (ref 149–390)
PMV BLD AUTO: 9.8 FL (ref 8.9–12.7)
RBC # BLD AUTO: 4.82 MILLION/UL (ref 3.81–5.12)
TIBC SERPL-MCNC: 411 UG/DL (ref 250–450)
UIBC SERPL-MCNC: 389 UG/DL (ref 155–355)
WBC # BLD AUTO: 8.62 THOUSAND/UL (ref 4.31–10.16)

## 2023-09-29 PROCEDURE — 83540 ASSAY OF IRON: CPT

## 2023-09-29 PROCEDURE — 82728 ASSAY OF FERRITIN: CPT

## 2023-09-29 PROCEDURE — 96365 THER/PROPH/DIAG IV INF INIT: CPT

## 2023-09-29 PROCEDURE — 85027 COMPLETE CBC AUTOMATED: CPT

## 2023-09-29 PROCEDURE — 83550 IRON BINDING TEST: CPT

## 2023-09-29 RX ORDER — SODIUM CHLORIDE 9 MG/ML
20 INJECTION, SOLUTION INTRAVENOUS ONCE
Status: COMPLETED | OUTPATIENT
Start: 2023-09-29 | End: 2023-09-29

## 2023-09-29 RX ORDER — SODIUM CHLORIDE 9 MG/ML
20 INJECTION, SOLUTION INTRAVENOUS ONCE
OUTPATIENT
Start: 2023-10-27

## 2023-09-29 RX ADMIN — SODIUM CHLORIDE 20 ML/HR: 0.9 INJECTION, SOLUTION INTRAVENOUS at 13:21

## 2023-09-29 RX ADMIN — IRON SUCROSE 300 MG: 20 INJECTION, SOLUTION INTRAVENOUS at 13:22

## 2023-10-27 ENCOUNTER — HOSPITAL ENCOUNTER (OUTPATIENT)
Dept: INFUSION CENTER | Facility: HOSPITAL | Age: 48
Discharge: HOME | End: 2023-10-27
Attending: INTERNAL MEDICINE
Payer: COMMERCIAL

## 2023-10-27 VITALS
RESPIRATION RATE: 18 BRPM | SYSTOLIC BLOOD PRESSURE: 144 MMHG | TEMPERATURE: 97.8 F | DIASTOLIC BLOOD PRESSURE: 82 MMHG | HEART RATE: 80 BPM

## 2023-10-27 DIAGNOSIS — D50.9 IRON DEFICIENCY ANEMIA, UNSPECIFIED IRON DEFICIENCY ANEMIA TYPE: Primary | ICD-10-CM

## 2023-10-27 DIAGNOSIS — N92.0 MENORRHAGIA WITH REGULAR CYCLE: ICD-10-CM

## 2023-10-27 PROCEDURE — 96365 THER/PROPH/DIAG IV INF INIT: CPT

## 2023-10-27 PROCEDURE — 96366 THER/PROPH/DIAG IV INF ADDON: CPT

## 2023-10-27 RX ORDER — SODIUM CHLORIDE 9 MG/ML
20 INJECTION, SOLUTION INTRAVENOUS ONCE
Status: COMPLETED | OUTPATIENT
Start: 2023-10-27 | End: 2023-10-27

## 2023-10-27 RX ORDER — SODIUM CHLORIDE 9 MG/ML
20 INJECTION, SOLUTION INTRAVENOUS ONCE
OUTPATIENT
Start: 2023-11-24

## 2023-10-27 RX ADMIN — SODIUM CHLORIDE 20 ML/HR: 0.9 INJECTION, SOLUTION INTRAVENOUS at 13:22

## 2023-10-27 RX ADMIN — IRON SUCROSE 300 MG: 20 INJECTION, SOLUTION INTRAVENOUS at 13:22

## 2023-10-27 NOTE — PROGRESS NOTES
Venofer infusion given without incident. Patient future appointment confirmed. Patient declined printed AVS.  Patient to return to department as scheduled.

## 2023-11-20 ENCOUNTER — OFFICE VISIT (OUTPATIENT)
Dept: INTERNAL MEDICINE CLINIC | Facility: CLINIC | Age: 48
End: 2023-11-20

## 2023-11-20 VITALS
BODY MASS INDEX: 28.77 KG/M2 | TEMPERATURE: 98.1 F | WEIGHT: 179 LBS | HEIGHT: 66 IN | SYSTOLIC BLOOD PRESSURE: 147 MMHG | DIASTOLIC BLOOD PRESSURE: 77 MMHG | HEART RATE: 76 BPM

## 2023-11-20 DIAGNOSIS — H81.11 BENIGN PAROXYSMAL POSITIONAL VERTIGO OF RIGHT EAR: ICD-10-CM

## 2023-11-20 DIAGNOSIS — R42 VERTIGO: Primary | ICD-10-CM

## 2023-11-20 PROCEDURE — 99213 OFFICE O/P EST LOW 20 MIN: CPT | Performed by: HOSPITALIST

## 2023-11-20 PROCEDURE — 3078F DIAST BP <80 MM HG: CPT | Performed by: HOSPITALIST

## 2023-11-20 PROCEDURE — 3077F SYST BP >= 140 MM HG: CPT | Performed by: HOSPITALIST

## 2023-11-20 RX ORDER — MECLIZINE HCL 12.5 MG/1
12.5 TABLET ORAL 3 TIMES DAILY PRN
Qty: 30 TABLET | Refills: 0 | Status: SHIPPED | OUTPATIENT
Start: 2023-11-20

## 2023-11-20 NOTE — PROGRESS NOTES
Name: Leighann Woods      : 1975      MRN: [id number]  Encounter Provider: Efraín Hernandez MD  Encounter Date: 2023   Encounter department: [address]     1. Vertigo  -     meclizine (ANTIVERT) 12.5 MG tablet; Take 1 tablet (12.5 mg total) by mouth 3 (three) times a day as needed for dizziness    2. Benign paroxysmal positional vertigo of right ear  Epley maneuver done with some improvement   -     Ambulatory Referral to Physical Therapy; Future           Subjective      Dizziness  Pertinent negatives include no abdominal pain, arthralgias, chest pain, chills, coughing, fever, rash, sore throat or vomiting. 49 yo F with hx of previous BPPV presenting with dizziness daily since Tuesday of last week. She states the dizziness only occurs when she is going to bed and when she turns around to the right side when sleeping. Sometimes is occurs during the day when she turns her head to right. Denies hearing changes. Does endorse sinus pressure and rhinorhea with congestion that started last week around the same time as the vertigo. Denies headache, vision changes, lightheadedness, chest pain, SOB, fever chills, neck pain. Review of Systems   Constitutional:  Negative for chills and fever. HENT:  Negative for ear pain and sore throat. Eyes:  Negative for pain and visual disturbance. Respiratory:  Negative for cough and shortness of breath. Cardiovascular:  Negative for chest pain and palpitations. Gastrointestinal:  Negative for abdominal pain and vomiting. Genitourinary:  Negative for dysuria and hematuria. Musculoskeletal:  Negative for arthralgias and back pain. Skin:  Negative for color change and rash. Neurological:  Positive for dizziness. Negative for seizures and syncope. All other systems reviewed and are negative.       Current Outpatient Medications on File Prior to Visit   Medication Sig acetaminophen (TYLENOL) 500 mg tablet Take 500 mg by mouth if needed for mild pain (Patient not taking: Reported on 11/20/2023)       Objective     /77 (BP Location: Left arm, Patient Position: Sitting, Cuff Size: Large)   Pulse 76   Temp 98.1 °F (36.7 °C) (Temporal)   Ht 5' 6" (1.676 m)   Wt 81.2 kg (179 lb)   BMI 28.89 kg/m²     Physical Exam  Constitutional:       Appearance: Normal appearance. HENT:      Nose: Nose normal.      Mouth/Throat:      Mouth: Mucous membranes are moist.      Pharynx: Oropharynx is clear. Eyes:      Extraocular Movements: Extraocular movements intact. Conjunctiva/sclera: Conjunctivae normal.   Cardiovascular:      Rate and Rhythm: Normal rate and regular rhythm. Pulmonary:      Effort: Pulmonary effort is normal.      Breath sounds: Normal breath sounds. Abdominal:      General: Bowel sounds are normal.      Palpations: Abdomen is soft. Musculoskeletal:         General: Normal range of motion. Cervical back: Normal range of motion. Right lower leg: No edema. Left lower leg: No edema. Neurological:      General: No focal deficit present. Mental Status: She is alert and oriented to person, place, and time. Cranial Nerves: No cranial nerve deficit. Motor: No weakness.       Gait: Gait normal.      Comments: Yannick Cobb positive  Epley maneuver done        Stacy Peralta MD

## 2023-11-24 ENCOUNTER — HOSPITAL ENCOUNTER (OUTPATIENT)
Dept: INFUSION CENTER | Facility: HOSPITAL | Age: 48
End: 2023-11-24
Attending: INTERNAL MEDICINE
Payer: COMMERCIAL

## 2023-11-24 DIAGNOSIS — D50.9 IRON DEFICIENCY ANEMIA, UNSPECIFIED IRON DEFICIENCY ANEMIA TYPE: Primary | ICD-10-CM

## 2023-11-24 DIAGNOSIS — N92.0 MENORRHAGIA WITH REGULAR CYCLE: ICD-10-CM

## 2023-11-24 PROCEDURE — 96366 THER/PROPH/DIAG IV INF ADDON: CPT

## 2023-11-24 PROCEDURE — 96365 THER/PROPH/DIAG IV INF INIT: CPT

## 2023-11-24 RX ORDER — SODIUM CHLORIDE 9 MG/ML
20 INJECTION, SOLUTION INTRAVENOUS ONCE
Status: COMPLETED | OUTPATIENT
Start: 2023-11-24 | End: 2023-11-24

## 2023-11-24 RX ORDER — SODIUM CHLORIDE 9 MG/ML
20 INJECTION, SOLUTION INTRAVENOUS ONCE
OUTPATIENT
Start: 2023-12-22

## 2023-11-24 RX ADMIN — IRON SUCROSE 300 MG: 20 INJECTION, SOLUTION INTRAVENOUS at 12:59

## 2023-11-24 RX ADMIN — SODIUM CHLORIDE 20 ML/HR: 0.9 INJECTION, SOLUTION INTRAVENOUS at 12:59

## 2023-11-26 ENCOUNTER — APPOINTMENT (OUTPATIENT)
Dept: LAB | Facility: CLINIC | Age: 48
End: 2023-11-26
Payer: COMMERCIAL

## 2023-11-26 DIAGNOSIS — K21.9 GASTROESOPHAGEAL REFLUX DISEASE, UNSPECIFIED WHETHER ESOPHAGITIS PRESENT: ICD-10-CM

## 2023-11-26 LAB
ERYTHROCYTE [DISTWIDTH] IN BLOOD BY AUTOMATED COUNT: 17.7 % (ref 11.6–15.1)
FERRITIN SERPL-MCNC: 200 NG/ML (ref 11–307)
HCT VFR BLD AUTO: 44.3 % (ref 34.8–46.1)
HGB BLD-MCNC: 13.8 G/DL (ref 11.5–15.4)
IRON SATN MFR SERPL: 22 % (ref 15–50)
IRON SERPL-MCNC: 68 UG/DL (ref 50–212)
MCH RBC QN AUTO: 26.4 PG (ref 26.8–34.3)
MCHC RBC AUTO-ENTMCNC: 31.2 G/DL (ref 31.4–37.4)
MCV RBC AUTO: 85 FL (ref 82–98)
PLATELET # BLD AUTO: 262 THOUSANDS/UL (ref 149–390)
PMV BLD AUTO: 10.5 FL (ref 8.9–12.7)
RBC # BLD AUTO: 5.23 MILLION/UL (ref 3.81–5.12)
TIBC SERPL-MCNC: 316 UG/DL (ref 250–450)
UIBC SERPL-MCNC: 248 UG/DL (ref 155–355)
WBC # BLD AUTO: 5.63 THOUSAND/UL (ref 4.31–10.16)

## 2023-11-29 ENCOUNTER — EVALUATION (OUTPATIENT)
Dept: PHYSICAL THERAPY | Facility: CLINIC | Age: 48
End: 2023-11-29
Payer: COMMERCIAL

## 2023-11-29 DIAGNOSIS — H81.11 BENIGN PAROXYSMAL POSITIONAL VERTIGO OF RIGHT EAR: ICD-10-CM

## 2023-11-29 PROCEDURE — 97162 PT EVAL MOD COMPLEX 30 MIN: CPT | Performed by: PHYSICAL THERAPIST

## 2023-11-29 PROCEDURE — 95992 CANALITH REPOSITIONING PROC: CPT | Performed by: PHYSICAL THERAPIST

## 2023-11-29 NOTE — PROGRESS NOTES
PT Evaluation     Today's date: 2023  Patient name: Finn Alonso  : 1975  MRN: [id number]  Referring provider: Olga Velazquez MD  Dx:   Encounter Diagnosis     ICD-10-CM    1. Benign paroxysmal positional vertigo of right ear  H81.11 Ambulatory Referral to Physical Therapy                     Assessment/Plan    Subjective Evaluation    History of Present Illness  Mechanism of injury: Pt reports having dizziness for a few weeks. When she bends over, gets out of bed, or moves fast she gets really dizzy. She reports dizziness lasts for 3-4 seconds and then stops. She reports the sensation feels like spinning. She denies it occurring previously. She reports having a migraine currently, denies having migraines previously. Does have a history of anemia, states that it is a different kind of dizziness. 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   Exercise history: none    Treatments  Previous treatment: physical therapy        Objective           Precautions:       Manuals                                                                 Neuro Re-Ed                                                                                                        Ther Ex                                                                                                                     Ther Activity                                       Gait Training                                       Modalities

## 2023-12-01 ENCOUNTER — OFFICE VISIT (OUTPATIENT)
Dept: PHYSICAL THERAPY | Facility: CLINIC | Age: 48
End: 2023-12-01
Payer: COMMERCIAL

## 2023-12-01 DIAGNOSIS — H81.11 BENIGN PAROXYSMAL POSITIONAL VERTIGO OF RIGHT EAR: Primary | ICD-10-CM

## 2023-12-01 PROCEDURE — 97112 NEUROMUSCULAR REEDUCATION: CPT | Performed by: PHYSICAL THERAPIST

## 2023-12-01 NOTE — PROGRESS NOTES
Daily Note     Today's date: 2023  Patient name: Carlitos Ham  : 1975  MRN: [id number]  Referring provider: Becca Dsouza MD  Dx: No diagnosis found. Subjective: Patient reports to physical therapy this date with reports of improved dizziness. No spinning but feeling yesterday like spinning dizziness. Objective: See treatment diary below      Assessment:   Patient had negative testing for feeings of dizziness in all positions this session and with no nystagmus. Pt educated on reasons to return to physical therapy including non resolutin of spinning dizziness or with continued feelings of lightheaded dizziness. Plan: Continue per plan of care.       Precautions:       Manuals                                                                 Neuro Re-Ed                                                                                                        Ther Ex                                                                                                                     Ther Activity                                       Gait Training                                       Modalities

## 2024-01-16 DIAGNOSIS — N92.0 MENORRHAGIA WITH REGULAR CYCLE: Primary | ICD-10-CM

## 2024-01-16 DIAGNOSIS — D50.9 IRON DEFICIENCY ANEMIA, UNSPECIFIED IRON DEFICIENCY ANEMIA TYPE: ICD-10-CM

## 2024-01-16 RX ORDER — SODIUM CHLORIDE 9 MG/ML
20 INJECTION, SOLUTION INTRAVENOUS ONCE
OUTPATIENT
Start: 2024-01-19

## 2024-01-19 ENCOUNTER — HOSPITAL ENCOUNTER (OUTPATIENT)
Dept: INFUSION CENTER | Facility: HOSPITAL | Age: 49
Discharge: HOME | End: 2024-01-19
Attending: INTERNAL MEDICINE

## 2024-02-06 ENCOUNTER — TELEPHONE (OUTPATIENT)
Dept: HEMATOLOGY ONCOLOGY | Facility: CLINIC | Age: 49
End: 2024-02-06

## 2024-02-06 NOTE — TELEPHONE ENCOUNTER
Spoke to Jade regarding her labs to be completed.  She stated she has no insurance right now but does not want to miss the appointment.  I rescheduled her appointment until 2/29/24 because her blood work is not due until 2/18/24.  Patient was ok with that.  Appointment rescheduled.

## 2024-02-14 DIAGNOSIS — N92.0 MENORRHAGIA WITH REGULAR CYCLE: Primary | ICD-10-CM

## 2024-02-14 DIAGNOSIS — D50.9 IRON DEFICIENCY ANEMIA, UNSPECIFIED IRON DEFICIENCY ANEMIA TYPE: ICD-10-CM

## 2024-02-14 RX ORDER — SODIUM CHLORIDE 9 MG/ML
20 INJECTION, SOLUTION INTRAVENOUS ONCE
OUTPATIENT
Start: 2024-02-16

## 2024-02-16 ENCOUNTER — TELEPHONE (OUTPATIENT)
Dept: INFUSION CENTER | Facility: HOSPITAL | Age: 49
End: 2024-02-16

## 2024-02-26 ENCOUNTER — TELEPHONE (OUTPATIENT)
Dept: HEMATOLOGY ONCOLOGY | Facility: CLINIC | Age: 49
End: 2024-02-26

## 2024-02-26 DIAGNOSIS — N92.0 MENORRHAGIA WITH REGULAR CYCLE: ICD-10-CM

## 2024-02-26 DIAGNOSIS — E53.8 VITAMIN B 12 DEFICIENCY: ICD-10-CM

## 2024-02-26 DIAGNOSIS — D50.9 IRON DEFICIENCY ANEMIA, UNSPECIFIED IRON DEFICIENCY ANEMIA TYPE: Primary | ICD-10-CM

## 2024-02-26 NOTE — TELEPHONE ENCOUNTER
Message left on voicemail as a reminder to complete labs ordered before upcoming appointment with Susan.

## 2024-02-27 ENCOUNTER — APPOINTMENT (OUTPATIENT)
Dept: LAB | Facility: CLINIC | Age: 49
End: 2024-02-27

## 2024-02-27 ENCOUNTER — DOCUMENTATION (OUTPATIENT)
Dept: HEMATOLOGY ONCOLOGY | Facility: CLINIC | Age: 49
End: 2024-02-27

## 2024-02-27 DIAGNOSIS — N92.0 MENORRHAGIA WITH REGULAR CYCLE: ICD-10-CM

## 2024-02-27 DIAGNOSIS — E53.8 VITAMIN B 12 DEFICIENCY: ICD-10-CM

## 2024-02-27 DIAGNOSIS — D50.9 IRON DEFICIENCY ANEMIA, UNSPECIFIED IRON DEFICIENCY ANEMIA TYPE: ICD-10-CM

## 2024-02-27 LAB
BASOPHILS # BLD AUTO: 0.02 THOUSANDS/ÂΜL (ref 0–0.1)
BASOPHILS NFR BLD AUTO: 0 % (ref 0–1)
EOSINOPHIL # BLD AUTO: 0.16 THOUSAND/ÂΜL (ref 0–0.61)
EOSINOPHIL NFR BLD AUTO: 2 % (ref 0–6)
ERYTHROCYTE [DISTWIDTH] IN BLOOD BY AUTOMATED COUNT: 13.5 % (ref 11.6–15.1)
FERRITIN SERPL-MCNC: 7 NG/ML (ref 11–307)
HCT VFR BLD AUTO: 39.9 % (ref 34.8–46.1)
HGB BLD-MCNC: 12.9 G/DL (ref 11.5–15.4)
IMM GRANULOCYTES # BLD AUTO: 0.01 THOUSAND/UL (ref 0–0.2)
IMM GRANULOCYTES NFR BLD AUTO: 0 % (ref 0–2)
IRON SATN MFR SERPL: 7 % (ref 15–50)
IRON SERPL-MCNC: 26 UG/DL (ref 50–212)
LYMPHOCYTES # BLD AUTO: 1.71 THOUSANDS/ÂΜL (ref 0.6–4.47)
LYMPHOCYTES NFR BLD AUTO: 25 % (ref 14–44)
MCH RBC QN AUTO: 27.7 PG (ref 26.8–34.3)
MCHC RBC AUTO-ENTMCNC: 32.3 G/DL (ref 31.4–37.4)
MCV RBC AUTO: 86 FL (ref 82–98)
MONOCYTES # BLD AUTO: 0.57 THOUSAND/ÂΜL (ref 0.17–1.22)
MONOCYTES NFR BLD AUTO: 8 % (ref 4–12)
NEUTROPHILS # BLD AUTO: 4.45 THOUSANDS/ÂΜL (ref 1.85–7.62)
NEUTS SEG NFR BLD AUTO: 65 % (ref 43–75)
NRBC BLD AUTO-RTO: 0 /100 WBCS
PLATELET # BLD AUTO: 309 THOUSANDS/UL (ref 149–390)
PMV BLD AUTO: 10.1 FL (ref 8.9–12.7)
RBC # BLD AUTO: 4.65 MILLION/UL (ref 3.81–5.12)
TIBC SERPL-MCNC: 353 UG/DL (ref 250–450)
UIBC SERPL-MCNC: 327 UG/DL (ref 155–355)
WBC # BLD AUTO: 6.92 THOUSAND/UL (ref 4.31–10.16)

## 2024-02-27 PROCEDURE — 83540 ASSAY OF IRON: CPT

## 2024-02-27 PROCEDURE — 36415 COLL VENOUS BLD VENIPUNCTURE: CPT

## 2024-02-27 PROCEDURE — 85025 COMPLETE CBC W/AUTO DIFF WBC: CPT

## 2024-02-27 PROCEDURE — 82728 ASSAY OF FERRITIN: CPT

## 2024-02-27 PROCEDURE — 83550 IRON BINDING TEST: CPT

## 2024-02-27 NOTE — PROGRESS NOTES
Called pt to talk to her about ins got her voicemail left a message for pt to call me back  Checked promise pt is inactive

## 2024-02-29 ENCOUNTER — DOCUMENTATION (OUTPATIENT)
Dept: HEMATOLOGY ONCOLOGY | Facility: CLINIC | Age: 49
End: 2024-02-29

## 2024-02-29 ENCOUNTER — OFFICE VISIT (OUTPATIENT)
Dept: HEMATOLOGY ONCOLOGY | Facility: CLINIC | Age: 49
End: 2024-02-29

## 2024-02-29 VITALS
TEMPERATURE: 97.9 F | HEIGHT: 66 IN | OXYGEN SATURATION: 100 % | DIASTOLIC BLOOD PRESSURE: 90 MMHG | HEART RATE: 80 BPM | SYSTOLIC BLOOD PRESSURE: 140 MMHG | RESPIRATION RATE: 17 BRPM | BODY MASS INDEX: 28.89 KG/M2

## 2024-02-29 DIAGNOSIS — N92.0 MENORRHAGIA WITH REGULAR CYCLE: ICD-10-CM

## 2024-02-29 DIAGNOSIS — D50.0 IRON DEFICIENCY ANEMIA DUE TO CHRONIC BLOOD LOSS: Primary | ICD-10-CM

## 2024-02-29 PROCEDURE — 99214 OFFICE O/P EST MOD 30 MIN: CPT | Performed by: NURSE PRACTITIONER

## 2024-02-29 NOTE — PATIENT INSTRUCTIONS
Available on Amazon iron supplements.     [facility] Bioenvision Liquid Iron Supplement for Women Men & Kids, Iron for Healthy Blood & Oxygen, Immune Support, Sugar Free, Vegan, Non-GMO, Gluten Free,     [employer] Blood Builder - Iron Supplement Clinically Shown to Increase Iron Levels without Side Effects - Iron Supplement for Women with Vitamin C, Vitamin B12 and Folic Acid - Vegan - 90 Tabs

## 2024-02-29 NOTE — PROGRESS NOTES
2ND ATTEMPT  Recvd email from maria c ramila that the pt will be needing help for her ins & medication got her voicemail left a message for pt to call me back

## 2024-02-29 NOTE — PROGRESS NOTES
[facility]ATOLOGY ONCOL[facility]Y [facility] [place]  [address] [facility] [address]  [phone]  Hematology Ambulatory Follow-Up  Jade Donald, 1975, [id number]  2/29/2024    Assessment/Plan:    1. Iron deficiency anemia due to chronic blood loss  2. Menorrhagia with regular cycle     Patient is a 48-year-old French-speaking female with a history of menorrhagia, iron deficiency status post Venofer infusions and vitamin B12 injections.  Today's visit is assisted with [employer]  Medardo [id number].  Labs from 2/27/2024 show a normal CBC and differential, iron saturation 7% with a ferritin level of 7.  His recent iron infusion was completed 11/24/2023.  We had arranged additional infusions however patient lost her insurance and is not able to afford the infusions.  She is not able to take oral supplements secondary to abdominal pain constipation distress.  We will reach out to our finance team to help patient's secure funding/insurance.  And we will make arrangements for repeat infusions once she has reestablished insurance.  In the meantime I gave her instructions on oral supplementation and suggested Corine Zimmerman's organics versus [employer] blood builder.  She may tolerate those forms iron better.  We will monitor labs every 3 months and see her in 6 months. Patient verbalized understanding and is in agreement to the plan. Patient knows to call the Hematology/Oncology office with any questions and concerns regarding the above.    - CBC; Standing  - Iron Panel (Includes Ferritin, Iron Sat%, Iron, and TIBC); Standing     Barrier(s) to care: None  The patient is able to self care.    Susan SANCHEZ  Medical Oncology/Hematology  [facility]    Interval history: clinically stable    Review of Systems   Constitutional:  Positive for fatigue. Negative for activity change, appetite change, fever and unexpected weight change.   Respiratory:   "Negative for cough and shortness of breath.    Cardiovascular:  Negative for chest pain and leg swelling.   Gastrointestinal:  Negative for abdominal pain, constipation, diarrhea and nausea.   Endocrine: Negative for cold intolerance and heat intolerance.   Musculoskeletal:  Negative for arthralgias and myalgias.   Skin: Negative.    Neurological:  Negative for dizziness, weakness and headaches.   Hematological:  Negative for adenopathy. Does not bruise/bleed easily.     Past Medical History:   Diagnosis Date    Anemia     Back pain, acute     Gastritis     GERD (gastroesophageal reflux disease)     History of transfusion 08/2021    Non-cardiac chest pain     Shortness of breath     with low hgb     Past Surgical History:   Procedure Laterality Date    EGD      ENDOMETRIAL ABLATION N/A 1/21/2022    Procedure: HYSTEROSCOPY ABLATION ENDOMETRIAL NOVASURE;  Surgeon: Abad Ragsdale MD;  [facility];  Service: Gynecology    AL HYSTEROSCOPY BX ENDOMETRIUM&/POLYPC W/WO D&C N/A 1/21/2022    Procedure: DILATATION AND CURETTAGE (D&C) WITH HYSTEROSCOPY;  Surgeon: Abad Ragsdale MD;  [facility];  Service: Gynecology    TUBAL LIGATION       Current Outpatient Medications:     meclizine (ANTIVERT) 12.5 MG tablet, Take 1 tablet (12.5 mg total) by mouth 3 (three) times a day as needed for dizziness, Disp: 30 tablet, Rfl: 0    acetaminophen (TYLENOL) 500 mg tablet, Take 500 mg by mouth if needed for mild pain (Patient not taking: Reported on 11/20/2023), Disp: , Rfl:     No Known Allergies    Objective:  /90 (BP Location: Left arm, Patient Position: Sitting, Cuff Size: Adult)   Pulse 80   Temp 97.9 °F (36.6 °C)   Resp 17   Ht 5' 6\" (1.676 m)   SpO2 100%   BMI 28.89 kg/m²    Physical Exam  Vitals reviewed.   Constitutional:       Appearance: Normal appearance. She is well-developed.   HENT:      Head: Normocephalic and atraumatic.   Eyes:      Conjunctiva/sclera: Conjunctivae normal.      Pupils: Pupils " are equal, round, and reactive to light.   Pulmonary:      Effort: Pulmonary effort is normal. No respiratory distress.   Musculoskeletal:         General: Normal range of motion.      Cervical back: Normal range of motion.   Lymphadenopathy:      Cervical: No cervical adenopathy.   Skin:     General: Skin is dry.   Neurological:      Mental Status: She is alert and oriented to person, place, and time.   Psychiatric:         Behavior: Behavior normal.     Result Review  Labs:  Lab Results   Component Value Date    WBC 6.92 02/27/2024    HGB 12.9 02/27/2024    HCT 39.9 02/27/2024    MCV 86 02/27/2024     02/27/2024     Lab Results   Component Value Date    SODIUM 139 01/07/2023    K 4.0 01/07/2023     01/07/2023    CO2 29 01/07/2023    AGAP 5 01/07/2023    BUN 8 01/07/2023    CREATININE 0.54 (L) 01/07/2023    GLUC 122 08/30/2022    GLUF 106 (H) 01/07/2023    CALCIUM 9.2 01/07/2023    AST 11 01/07/2023    ALT 17 01/07/2023    ALKPHOS 69 01/07/2023    TP 7.6 01/07/2023    TBILI 0.50 01/07/2023    EGFR 112 01/07/2023     Imaging:  No results found.    Please note:  This report has been generated by a voice recognition software system. Therefore there may be syntax, spelling, and/or grammatical errors. Please call if you have any questions.

## 2024-03-07 ENCOUNTER — DOCUMENTATION (OUTPATIENT)
Dept: HEMATOLOGY ONCOLOGY | Facility: CLINIC | Age: 49
End: 2024-03-07

## 2024-03-07 NOTE — PROGRESS NOTES
Emailed the applicaiotn for venofer to the pt.. once she sends it back to me I will forward to the provider for her signature

## 2024-03-12 ENCOUNTER — DOCUMENTATION (OUTPATIENT)
Dept: HEMATOLOGY ONCOLOGY | Facility: CLINIC | Age: 49
End: 2024-03-12

## 2024-03-12 DIAGNOSIS — N92.0 MENORRHAGIA WITH REGULAR CYCLE: Primary | ICD-10-CM

## 2024-03-12 DIAGNOSIS — D50.9 IRON DEFICIENCY ANEMIA, UNSPECIFIED IRON DEFICIENCY ANEMIA TYPE: ICD-10-CM

## 2024-03-12 RX ORDER — SODIUM CHLORIDE 9 MG/ML
20 INJECTION, SOLUTION INTRAVENOUS ONCE
Status: CANCELLED | OUTPATIENT
Start: 2024-03-15

## 2024-03-12 NOTE — PROGRESS NOTES
Called pt to see if she recvd the application that I emailed to her on 03/07/24. She sd that the emial address she uses is her daughters & she will ask  her to look to see if she recvd it. She sd that she will call  me back later today

## 2024-03-13 ENCOUNTER — DOCUMENTATION (OUTPATIENT)
Dept: HEMATOLOGY ONCOLOGY | Facility: CLINIC | Age: 49
End: 2024-03-13

## 2024-03-13 NOTE — PROGRESS NOTES
Called pt to see if she sent back the application. She sd that her dghtr did. Pts daughter is kathy her# is [phone].  Called dghtr & she sd she did fax it but it never went thru. She refaxed it  Recvd the application however they pt signed on the Dr section. Called the pt to let her know that she needs top sign it in the pt section & not the prescriber section.  Email the application to the pt

## 2024-03-15 ENCOUNTER — HOSPITAL ENCOUNTER (OUTPATIENT)
Dept: INFUSION CENTER | Facility: HOSPITAL | Age: 49
End: 2024-03-15
Attending: INTERNAL MEDICINE

## 2024-03-15 VITALS
RESPIRATION RATE: 18 BRPM | TEMPERATURE: 97.5 F | SYSTOLIC BLOOD PRESSURE: 146 MMHG | HEART RATE: 83 BPM | DIASTOLIC BLOOD PRESSURE: 83 MMHG

## 2024-03-15 DIAGNOSIS — D50.9 IRON DEFICIENCY ANEMIA, UNSPECIFIED IRON DEFICIENCY ANEMIA TYPE: Primary | ICD-10-CM

## 2024-03-15 DIAGNOSIS — N92.0 MENORRHAGIA WITH REGULAR CYCLE: ICD-10-CM

## 2024-03-15 PROCEDURE — 96365 THER/PROPH/DIAG IV INF INIT: CPT

## 2024-03-15 RX ORDER — SODIUM CHLORIDE 9 MG/ML
20 INJECTION, SOLUTION INTRAVENOUS ONCE
Status: COMPLETED | OUTPATIENT
Start: 2024-03-15 | End: 2024-03-15

## 2024-03-15 RX ORDER — SODIUM CHLORIDE 9 MG/ML
20 INJECTION, SOLUTION INTRAVENOUS ONCE
OUTPATIENT
Start: 2024-04-12

## 2024-03-15 RX ADMIN — SODIUM CHLORIDE 20 ML/HR: 0.9 INJECTION, SOLUTION INTRAVENOUS at 14:01

## 2024-03-15 RX ADMIN — IRON SUCROSE 300 MG: 20 INJECTION, SOLUTION INTRAVENOUS at 13:59

## 2024-03-15 NOTE — PROGRESS NOTES
Pt tolerated venofer today without complications. Confirmed next appt 4-12-24 1:30, AVS provided.

## 2024-03-20 ENCOUNTER — DOCUMENTATION (OUTPATIENT)
Dept: HEMATOLOGY ONCOLOGY | Facility: CLINIC | Age: 49
End: 2024-03-20

## 2024-03-20 NOTE — PROGRESS NOTES
Called pt to find out if her dghtr sent back the application she sd that she doesn't know i can call her daughter.  Called dghtr kleber & she sd that she didn't send it back but she will  She faxed the [place] to me & I recvd it & I emailed it to maria c for her signature..

## 2024-03-21 ENCOUNTER — DOCUMENTATION (OUTPATIENT)
Dept: HEMATOLOGY ONCOLOGY | Facility: CLINIC | Age: 49
End: 2024-03-21

## 2024-03-21 NOTE — PROGRESS NOTES
Michelle from [employer] called & sd that pt was approved for free venofer  Id# [id number]  Effective 03/01/24-03/01/25  She sd that after the pt gets infused just submit  the info on the product replacement form that they are faxing to me & she sd that they don't need any flow sheets.  Called the pt & gave her that info & she thanked me for the call & the help  Emailed the team  Added to ap

## 2024-04-09 ENCOUNTER — OFFICE VISIT (OUTPATIENT)
Dept: DENTISTRY | Facility: CLINIC | Age: 49
End: 2024-04-09

## 2024-04-09 ENCOUNTER — OFFICE VISIT (OUTPATIENT)
Dept: INTERNAL MEDICINE CLINIC | Facility: CLINIC | Age: 49
End: 2024-04-09

## 2024-04-09 VITALS
BODY MASS INDEX: 29.21 KG/M2 | DIASTOLIC BLOOD PRESSURE: 90 MMHG | TEMPERATURE: 97.9 F | SYSTOLIC BLOOD PRESSURE: 159 MMHG | WEIGHT: 181 LBS | HEART RATE: 82 BPM

## 2024-04-09 VITALS — SYSTOLIC BLOOD PRESSURE: 161 MMHG | HEART RATE: 78 BPM | DIASTOLIC BLOOD PRESSURE: 86 MMHG

## 2024-04-09 DIAGNOSIS — I20.89 STABLE ANGINA: ICD-10-CM

## 2024-04-09 DIAGNOSIS — Z01.20 ENCOUNTER FOR DENTAL EXAMINATION: Primary | ICD-10-CM

## 2024-04-09 DIAGNOSIS — M54.2 NECK PAIN: Primary | ICD-10-CM

## 2024-04-09 PROCEDURE — 93000 ELECTROCARDIOGRAM COMPLETE: CPT | Performed by: STUDENT IN AN ORGANIZED HEALTH CARE EDUCATION/TRAINING PROGRAM

## 2024-04-09 PROCEDURE — D0120 PERIODIC ORAL EVALUATION - ESTABLISHED PATIENT: HCPCS

## 2024-04-09 PROCEDURE — 99213 OFFICE O/P EST LOW 20 MIN: CPT | Performed by: STUDENT IN AN ORGANIZED HEALTH CARE EDUCATION/TRAINING PROGRAM

## 2024-04-09 NOTE — PROGRESS NOTES
[facility]  INTERNAL MEDICINE OFFICE VISIT     PATIENT INFORMATION     Jade Donald   48 y.o. female   MRN: [id number]    ASSESSMENT/PLAN     Diagnoses and all orders for this visit:    Neck pain  Symptoms concerning for infectious versus malignant versus autoimmune etiology given fevers, chills, visual changes.  Will obtain workup as below, unclear of vaccination history so we will obtain antibodies as below.  Treatment plan depending on results of studies below.  Discussed patient urgency of obtaining lab work and should symptoms worsen or patient spikes a high-grade fever or persistent loss of vision, patient should immediately present to the ED.  Patient is understanding and will obtain workup.  -     Sedimentation rate, automated; Future  -     C-reactive protein; Future  -     CBC and differential; Future  -     Comprehensive metabolic panel; Future  -     Hepatitis B Immunity Panel; Future  -     Measles/Mumps/Rubella Immunity; Future  -     Lyme Total AB W Reflex to IGM/IGG; Future  -     Varicella Zoster Virus DNA,PCR; Future  -     Varicella zoster antibody, IgM; Future  -     Varicella zoster antibody, IgG; Future  -     West Nile antibodies, IgG and IgM; Future  -     West Nile virus, CSF; Future  -     CTA head and neck w wo contrast; Future  -     MRI brain w wo contrast; Future  -     MRI soft tissue neck wo and w contrast; Future    Stable angina  We will repeat EKG at next visit once symptoms resolve, nonspecific T wave inversions present without active chest pain.  If persistent, will recommend TTE and cardiology referral for stress test.  -     POCT ECG    Schedule a follow-up appointment in one month.    HEALTH MAINTENANCE     Immunization History   Administered Date(s) Administered    Tdap 04/11/2016, 04/11/2016     CHIEF COMPLAINT     Chief Complaint   Patient presents with    Neck Pain     Neck pain going up the back of her head x 3 weeks       HISTORY OF PRESENT ILLNESS     Jade Donald is a 48 y.o. patient who presents to the clinic for ongoing neck pain for the past three weeks.    Denies any trauma to the region, any recent travel, recent illnesses.  Patient does endorse fevers and chills as well as blurry vision.  Patient states pain starts at the bottom of her neck and moved to the back of her head.  Occasionally will have nausea, appears to be secondary to severe pain.  Pain does worsen with flexion or extension of the neck.  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  Denies any tobacco, alcohol, recreational drug use.  Only medication currently is meclizine.  Denies any numbness, tingling in her extremities.    Patient does also endorse chest tightness which is separate from her neck pain episodes.  Chest tightness will occur with exertion, for example going up the stairs with associated shortness of breath.  Patient did have a stress test in the past which is negative for any ischemic changes.    REVIEW OF SYSTEMS     Review of Systems   All other systems reviewed and are negative.    OBJECTIVE     Vitals:    04/09/24 1418   BP: 159/90   BP Location: Right arm   Patient Position: Sitting   Cuff Size: Standard   Pulse: 82   Temp: 97.9 °F (36.6 °C)   TempSrc: Temporal   Weight: 82.1 kg (181 lb)     Physical Exam  Vitals reviewed.   Constitutional:       General: She is not in acute distress.     Appearance: Normal appearance.   HENT:      Head: Normocephalic and atraumatic.   Eyes:      Conjunctiva/sclera: Conjunctivae normal.   Neck:      Meningeal: Brudzinski's sign and Kernig's sign absent.      Comments: Midline tenderness of the cervical spine  Cardiovascular:      Rate and Rhythm: Normal rate and regular rhythm.      Pulses: Normal pulses.      Heart sounds: Normal heart sounds. No murmur heard.  Pulmonary:      Effort: Pulmonary effort is normal. No respiratory  distress.      Breath sounds: Normal breath sounds.   Abdominal:      General: Abdomen is flat. Bowel sounds are normal. There is no distension.      Palpations: Abdomen is soft.      Tenderness: There is no abdominal tenderness. There is no guarding.   Musculoskeletal:         General: No swelling.      Cervical back: No edema, erythema or signs of trauma. Pain with movement (Flexion) present.      Right lower leg: No edema.      Left lower leg: No edema.   Skin:     General: Skin is warm.      Capillary Refill: Capillary refill takes less than 2 seconds.   Neurological:      Mental Status: She is alert. Mental status is at baseline.      Gait: Gait normal.   Psychiatric:         Mood and Affect: Mood normal.         Behavior: Behavior normal.       CURRENT MEDICATIONS     Current Outpatient Medications:     acetaminophen (TYLENOL) 500 mg tablet, Take 500 mg by mouth if needed for mild pain (Patient not taking: Reported on 11/20/2023), Disp: , Rfl:     meclizine (ANTIVERT) 12.5 MG tablet, Take 1 tablet (12.5 mg total) by mouth 3 (three) times a day as needed for dizziness, Disp: 30 tablet, Rfl: 0    PAST MEDICAL & SURGICAL HISTORY     Past Medical History:   Diagnosis Date    Anemia     Back pain, acute     Gastritis     GERD (gastroesophageal reflux disease)     History of transfusion 08/2021    Non-cardiac chest pain     Shortness of breath     with low hgb     Past Surgical History:   Procedure Laterality Date    EGD      ENDOMETRIAL ABLATION N/A 1/21/2022    Procedure: HYSTEROSCOPY ABLATION ENDOMETRIAL NOVASURE;  Surgeon: Abad Ragsdale MD;  [facility];  Service: Gynecology    MS HYSTEROSCOPY BX ENDOMETRIUM&/POLYPC W/WO D&C N/A 1/21/2022    Procedure: DILATATION AND CURETTAGE (D&C) WITH HYSTEROSCOPY;  Surgeon: Abad Ragsdale MD;  [facility];  Service: Gynecology    TUBAL LIGATION       SOCIAL & FAMILY HISTORY     Social History     Socioeconomic History    Marital status: Single     Spouse  name: Not on file    Number of children: Not on file    Years of education: Not on file    Highest education level: Not on file   Occupational History    Not on file   Tobacco Use    Smoking status: Never    Smokeless tobacco: Never   Vaping Use    Vaping status: Never Used   Substance and Sexual Activity    Alcohol use: Never    Drug use: Never    Sexual activity: Yes     Partners: Male     Birth control/protection: Female Sterilization   Other Topics Concern    Not on file   Social History Narrative    Not on file     Social Determinants of Health     Financial Resource Strain: Low Risk  (4/9/2024)    Overall Financial Resource Strain (CARDIA)     Difficulty of Paying Living Expenses: Not hard at all   Food Insecurity: No Food Insecurity (4/9/2024)    Hunger Vital Sign     Worried About Running Out of Food in the Last Year: Never true     Ran Out of Food in the Last Year: Never true   Transportation Needs: No Transportation Needs (4/9/2024)    PRAPARE - Transportation     Lack of Transportation (Medical): No     Lack of Transportation (Non-Medical): No   Physical Activity: Inactive (7/14/2021)    Exercise Vital Sign     Days of Exercise per Week: 0 days     Minutes of Exercise per Session: 0 min   Stress: No Stress Concern Present (7/14/2021)    [place] of [employer] - Occupational Stress Questionnaire     Feeling of Stress : Not at all   Social Connections: Socially Isolated (7/14/2021)    Social Connection and Isolation Panel [NHANES]     Frequency of Communication with Friends and Family: More than three times a week     Frequency of Social Gatherings with Friends and Family: More than three times a week     Attends Alevism Services: Never     Active Member of Clubs or Organizations: No     Attends Club or Organization Meetings: Never     Marital Status: Never    Intimate Partner Violence: Not At Risk (7/14/2021)    Humiliation, Afraid, Rape, and Kick questionnaire     Fear of  Current or Ex-Partner: No     Emotionally Abused: No     Physically Abused: No     Sexually Abused: No   Housing Stability: Low Risk  (4/9/2024)    Housing Stability Vital Sign     Unable to Pay for Housing in the Last Year: No     Number of Places Lived in the Last Year: 1     Unstable Housing in the Last Year: No     Social History     Substance and Sexual Activity   Alcohol Use Never       Social History     Substance and Sexual Activity   Drug Use Never     Social History     Tobacco Use   Smoking Status Never   Smokeless Tobacco Never     Family History   Problem Relation Age of Onset    Hypertension Mother     Stroke Mother     No Known Problems Father     Hypertension Maternal Grandmother     Stroke Maternal Grandmother     Hypertension Maternal Uncle     Stroke Maternal Uncle     Stroke Paternal Aunt     No Known Problems Sister     HIV Brother     No Known Problems Daughter     No Known Problems Son     No Known Problems Maternal Grandfather     No Known Problems Paternal Grandmother     No Known Problems Paternal Grandfather     No Known Problems Sister     No Known Problems Daughter          ==  Ali Faisal, DO  [facility]'s Internal Medicine Residency, PGY-III    [address], [address]  [place] [address]  Office: [phone]  Fax: [phone]     ====================================  PLEASE NOTE:  This encounter was completed utilizing the Dragon Medical One Voice Recognition Software. Grammatical errors, random word insertions, pronoun errors and incomplete sentences are occasional consequences of the system due to software limitations, ambient noise and hardware issues.These may be missed by proof reading prior to affixing electronic signature. Any questions or concerns about the content, text or information contained within the body of this dictation should be directly addressed to the physician for clarification. Please do not hesitate to call me  directly if you have any any questions or concerns.

## 2024-04-09 NOTE — DENTAL PROCEDURE DETAILS
Periodic exam    Jade Rosepaulino Odilon 48 y.o. female presents with self to [facility] for Periodic exam  PMH reviewed, no changes, ASA II. Significant medical history: N. Significant allergies: N. Significant medications: N.  Pain level 0/10    Chief complaint: Wants to do something about #7    Oral cancer screening: no abnormalities detected    Soft tissue exam: within normal limits    Hard tissue exam: within normal limits    Radiographs: No new radiographs, films are current    Restorative needs: #7 RCT, post/core, crown  #7 has large caries into the pulp  Margin of caries is supragingival, good restorative prognosis with RCT, post/core, and crown. Tx options reviewed including Implant, RCT/post-core/crown, and removable. Patient understands and opts for RCT.  Other teeth with caries: As charted.    Perio spot probing: PD >4, minimal BOP, gingiva is pink, stippled, non-inflammed.     Tx needs:   Prophy  Caries control  #7 RCT, post/core, crown    Patient dismissed ambulatory and alert    NV: #7 RCT.  NV2: Prophy    Attending Dr. Boo

## 2024-04-10 ENCOUNTER — HOSPITAL ENCOUNTER (OUTPATIENT)
Dept: CT IMAGING | Facility: HOSPITAL | Age: 49
Discharge: HOME | End: 2024-04-10

## 2024-04-10 ENCOUNTER — APPOINTMENT (OUTPATIENT)
Dept: LAB | Facility: CLINIC | Age: 49
End: 2024-04-10

## 2024-04-10 DIAGNOSIS — M54.2 NECK PAIN: ICD-10-CM

## 2024-04-10 LAB
ALBUMIN SERPL BCP-MCNC: 4.2 G/DL (ref 3.5–5)
ALP SERPL-CCNC: 62 U/L (ref 34–104)
ALT SERPL W P-5'-P-CCNC: 15 U/L (ref 7–52)
ANION GAP SERPL CALCULATED.3IONS-SCNC: 9 MMOL/L (ref 4–13)
AST SERPL W P-5'-P-CCNC: 18 U/L (ref 13–39)
B BURGDOR IGG+IGM SER QL IA: NEGATIVE
BASOPHILS # BLD AUTO: 0.03 THOUSANDS/ÂΜL (ref 0–0.1)
BASOPHILS NFR BLD AUTO: 1 % (ref 0–1)
BILIRUB SERPL-MCNC: 0.47 MG/DL (ref 0.2–1)
BUN SERPL-MCNC: 11 MG/DL (ref 5–25)
CALCIUM SERPL-MCNC: 9.6 MG/DL (ref 8.4–10.2)
CHLORIDE SERPL-SCNC: 103 MMOL/L (ref 96–108)
CO2 SERPL-SCNC: 28 MMOL/L (ref 21–32)
CREAT SERPL-MCNC: 0.54 MG/DL (ref 0.6–1.3)
CRP SERPL QL: 6.7 MG/L
EOSINOPHIL # BLD AUTO: 0.19 THOUSAND/ÂΜL (ref 0–0.61)
EOSINOPHIL NFR BLD AUTO: 4 % (ref 0–6)
ERYTHROCYTE [DISTWIDTH] IN BLOOD BY AUTOMATED COUNT: 13.7 % (ref 11.6–15.1)
ERYTHROCYTE [SEDIMENTATION RATE] IN BLOOD: 50 MM/HOUR (ref 0–19)
GFR SERPL CREATININE-BSD FRML MDRD: 111 ML/MIN/1.73SQ M
GLUCOSE P FAST SERPL-MCNC: 91 MG/DL (ref 65–99)
HBV CORE AB SER QL: NORMAL
HBV SURFACE AB SER-ACNC: 40 MIU/ML
HCT VFR BLD AUTO: 42.7 % (ref 34.8–46.1)
HGB BLD-MCNC: 13.6 G/DL (ref 11.5–15.4)
IMM GRANULOCYTES # BLD AUTO: 0.02 THOUSAND/UL (ref 0–0.2)
IMM GRANULOCYTES NFR BLD AUTO: 0 % (ref 0–2)
LYMPHOCYTES # BLD AUTO: 1.28 THOUSANDS/ÂΜL (ref 0.6–4.47)
LYMPHOCYTES NFR BLD AUTO: 24 % (ref 14–44)
MCH RBC QN AUTO: 27.1 PG (ref 26.8–34.3)
MCHC RBC AUTO-ENTMCNC: 31.9 G/DL (ref 31.4–37.4)
MCV RBC AUTO: 85 FL (ref 82–98)
MONOCYTES # BLD AUTO: 0.41 THOUSAND/ÂΜL (ref 0.17–1.22)
MONOCYTES NFR BLD AUTO: 8 % (ref 4–12)
NEUTROPHILS # BLD AUTO: 3.5 THOUSANDS/ÂΜL (ref 1.85–7.62)
NEUTS SEG NFR BLD AUTO: 63 % (ref 43–75)
NRBC BLD AUTO-RTO: 0 /100 WBCS
PLATELET # BLD AUTO: 321 THOUSANDS/UL (ref 149–390)
PMV BLD AUTO: 9.6 FL (ref 8.9–12.7)
POTASSIUM SERPL-SCNC: 3.9 MMOL/L (ref 3.5–5.3)
PROT SERPL-MCNC: 7.4 G/DL (ref 6.4–8.4)
RBC # BLD AUTO: 5.01 MILLION/UL (ref 3.81–5.12)
SODIUM SERPL-SCNC: 140 MMOL/L (ref 135–147)
VZV IGG SER QL IA: NORMAL
WBC # BLD AUTO: 5.43 THOUSAND/UL (ref 4.31–10.16)

## 2024-04-10 PROCEDURE — 86765 RUBEOLA ANTIBODY: CPT

## 2024-04-10 PROCEDURE — 86789 WEST NILE VIRUS ANTIBODY: CPT

## 2024-04-10 PROCEDURE — 86787 VARICELLA-ZOSTER ANTIBODY: CPT

## 2024-04-10 PROCEDURE — 86762 RUBELLA ANTIBODY: CPT

## 2024-04-10 PROCEDURE — 70496 CT ANGIOGRAPHY HEAD: CPT

## 2024-04-10 PROCEDURE — 86618 LYME DISEASE ANTIBODY: CPT

## 2024-04-10 PROCEDURE — 86788 WEST NILE VIRUS AB IGM: CPT

## 2024-04-10 PROCEDURE — 86140 C-REACTIVE PROTEIN: CPT

## 2024-04-10 PROCEDURE — 86735 MUMPS ANTIBODY: CPT

## 2024-04-10 PROCEDURE — 87798 DETECT AGENT NOS DNA AMP: CPT

## 2024-04-10 PROCEDURE — 86704 HEP B CORE ANTIBODY TOTAL: CPT

## 2024-04-10 PROCEDURE — 85652 RBC SED RATE AUTOMATED: CPT

## 2024-04-10 PROCEDURE — 80053 COMPREHEN METABOLIC PANEL: CPT

## 2024-04-10 PROCEDURE — 36415 COLL VENOUS BLD VENIPUNCTURE: CPT

## 2024-04-10 PROCEDURE — 70498 CT ANGIOGRAPHY NECK: CPT

## 2024-04-10 PROCEDURE — 86706 HEP B SURFACE ANTIBODY: CPT

## 2024-04-10 PROCEDURE — 85025 COMPLETE CBC W/AUTO DIFF WBC: CPT

## 2024-04-10 RX ADMIN — IOHEXOL 75 ML: 350 INJECTION, SOLUTION INTRAVENOUS at 12:08

## 2024-04-11 ENCOUNTER — HOSPITAL ENCOUNTER (OUTPATIENT)
Dept: MRI IMAGING | Facility: HOSPITAL | Age: 49
End: 2024-04-11
Payer: COMMERCIAL

## 2024-04-11 DIAGNOSIS — M54.2 NECK PAIN: ICD-10-CM

## 2024-04-11 LAB
MEV IGG SER IA-ACNC: <13.5 AU/ML
MUV IGG SER IA-ACNC: <9 AU/ML
RUBV IGG SERPL IA-ACNC: 6.79 INDEX
VZV IGM SER IA-ACNC: <0.91 INDEX (ref 0–0.9)

## 2024-04-11 PROCEDURE — A9585 GADOBUTROL INJECTION: HCPCS | Performed by: STUDENT IN AN ORGANIZED HEALTH CARE EDUCATION/TRAINING PROGRAM

## 2024-04-11 PROCEDURE — 70553 MRI BRAIN STEM W/O & W/DYE: CPT

## 2024-04-11 RX ORDER — GADOBUTROL 604.72 MG/ML
8 INJECTION INTRAVENOUS
Status: COMPLETED | OUTPATIENT
Start: 2024-04-11 | End: 2024-04-11

## 2024-04-11 RX ADMIN — GADOBUTROL 8 ML: 604.72 INJECTION INTRAVENOUS at 11:34

## 2024-04-12 ENCOUNTER — HOSPITAL ENCOUNTER (OUTPATIENT)
Dept: MRI IMAGING | Facility: HOSPITAL | Age: 49
End: 2024-04-12

## 2024-04-12 ENCOUNTER — HOSPITAL ENCOUNTER (OUTPATIENT)
Dept: INFUSION CENTER | Facility: HOSPITAL | Age: 49
End: 2024-04-12
Attending: INTERNAL MEDICINE
Payer: COMMERCIAL

## 2024-04-12 VITALS
HEART RATE: 83 BPM | SYSTOLIC BLOOD PRESSURE: 136 MMHG | TEMPERATURE: 97 F | RESPIRATION RATE: 20 BRPM | DIASTOLIC BLOOD PRESSURE: 89 MMHG

## 2024-04-12 DIAGNOSIS — D50.9 IRON DEFICIENCY ANEMIA, UNSPECIFIED IRON DEFICIENCY ANEMIA TYPE: Primary | ICD-10-CM

## 2024-04-12 DIAGNOSIS — M54.2 NECK PAIN: ICD-10-CM

## 2024-04-12 DIAGNOSIS — N92.0 MENORRHAGIA WITH REGULAR CYCLE: ICD-10-CM

## 2024-04-12 LAB
WNV IGG SER QL IA: POSITIVE
WNV IGM SER QL IA: NEGATIVE

## 2024-04-12 PROCEDURE — 70543 MRI ORBT/FAC/NCK W/O &W/DYE: CPT

## 2024-04-12 PROCEDURE — 96366 THER/PROPH/DIAG IV INF ADDON: CPT

## 2024-04-12 PROCEDURE — A9585 GADOBUTROL INJECTION: HCPCS | Performed by: STUDENT IN AN ORGANIZED HEALTH CARE EDUCATION/TRAINING PROGRAM

## 2024-04-12 PROCEDURE — 96365 THER/PROPH/DIAG IV INF INIT: CPT

## 2024-04-12 RX ORDER — SODIUM CHLORIDE 9 MG/ML
20 INJECTION, SOLUTION INTRAVENOUS ONCE
OUTPATIENT
Start: 2024-05-10

## 2024-04-12 RX ORDER — GADOBUTROL 604.72 MG/ML
8 INJECTION INTRAVENOUS
Status: COMPLETED | OUTPATIENT
Start: 2024-04-12 | End: 2024-04-12

## 2024-04-12 RX ORDER — SODIUM CHLORIDE 9 MG/ML
20 INJECTION, SOLUTION INTRAVENOUS ONCE
Status: COMPLETED | OUTPATIENT
Start: 2024-04-12 | End: 2024-04-12

## 2024-04-12 RX ADMIN — SODIUM CHLORIDE 20 ML/HR: 0.9 INJECTION, SOLUTION INTRAVENOUS at 13:34

## 2024-04-12 RX ADMIN — IRON SUCROSE 300 MG: 20 INJECTION, SOLUTION INTRAVENOUS at 13:34

## 2024-04-12 RX ADMIN — GADOBUTROL 8 ML: 604.72 INJECTION INTRAVENOUS at 11:55

## 2024-04-12 NOTE — PROGRESS NOTES
Jade Donald  tolerated treatment well with no complications.      Jade Donald is aware of future appt on 5/10 at 130.     AVS printed and given to Jade Donald:  Yes

## 2024-04-13 LAB — VZV DNA SPEC QL NAA+PROBE: NEGATIVE

## 2024-04-16 ENCOUNTER — OFFICE VISIT (OUTPATIENT)
Dept: INTERNAL MEDICINE CLINIC | Facility: CLINIC | Age: 49
End: 2024-04-16

## 2024-04-16 ENCOUNTER — TELEPHONE (OUTPATIENT)
Dept: INTERNAL MEDICINE CLINIC | Facility: CLINIC | Age: 49
End: 2024-04-16

## 2024-04-16 VITALS
RESPIRATION RATE: 18 BRPM | BODY MASS INDEX: 29.01 KG/M2 | DIASTOLIC BLOOD PRESSURE: 89 MMHG | TEMPERATURE: 97.9 F | HEIGHT: 66 IN | SYSTOLIC BLOOD PRESSURE: 150 MMHG | HEART RATE: 76 BPM | WEIGHT: 180.5 LBS | OXYGEN SATURATION: 99 %

## 2024-04-16 DIAGNOSIS — M54.2 NECK PAIN: Primary | ICD-10-CM

## 2024-04-16 DIAGNOSIS — Z12.31 ENCOUNTER FOR SCREENING MAMMOGRAM FOR BREAST CANCER: ICD-10-CM

## 2024-04-16 PROCEDURE — 99213 OFFICE O/P EST LOW 20 MIN: CPT | Performed by: STUDENT IN AN ORGANIZED HEALTH CARE EDUCATION/TRAINING PROGRAM

## 2024-04-16 NOTE — PROGRESS NOTES
"[facility]  INTERNAL MEDICINE OFFICE VISIT     PATIENT INFORMATION     Jade Donald   48 y.o. female   MRN: [id number]    ASSESSMENT/PLAN     Diagnoses and all orders for this visit:    Neck pain  Pain has significantly improved since last visit. No longer with chills, fevers. Unclear etiology but work up from last week unremarkable. Patient to notify clinic if symptoms recur.     Encounter for screening mammogram for breast cancer  -     Mammo screening bilateral w 3d & cad; Future    Schedule a follow-up appointment for annual physical.    HEALTH MAINTENANCE     Immunization History   Administered Date(s) Administered    Tdap 04/11/2016, 04/11/2016     CHIEF COMPLAINT     No chief complaint on file.     HISTORY OF PRESENT ILLNESS     Jade Donald is a 48 y.o. patient who presents to the clinic for follow up and discussion of neck pain, blood work and imaging.    Notes that neck pain is much improved. No longer with fevers, chills, nausea. No longer with blurry vision, severe headache. Neck pain is mild and well controlled with Tylenol.    Imaging was unremarkable aside from some mild adenopathy. Imaging with elevated inflammatory markers in ESR/CRP, positive IgG West Nile. Denies any known exposure to West Nile int he past. Born in [place] and lived there for roughly 26 years. Does not remember any hospitalizations as a kid.    Lab work indicates lack of immunity against Varicella, MMR. Discussed importance of vaccinations which patient originally agreed to but then declined.    REVIEW OF SYSTEMS     Review of Systems   All other systems reviewed and are negative.    OBJECTIVE     Vitals:    04/16/24 1446   BP: 150/89   Pulse: 76   Resp: 18   Temp: 97.9 °F (36.6 °C)   TempSrc: Temporal   SpO2: 99%   Weight: 81.9 kg (180 lb 8 oz)   Height: 5' 6\" (1.676 m)     Physical Exam  Vitals reviewed.   Constitutional:       General: She is not in acute " distress.     Appearance: Normal appearance.   HENT:      Head: Normocephalic and atraumatic.   Eyes:      Conjunctiva/sclera: Conjunctivae normal.   Cardiovascular:      Rate and Rhythm: Normal rate and regular rhythm.   Pulmonary:      Effort: Pulmonary effort is normal.   Musculoskeletal:         General: No swelling.      Cervical back: Normal range of motion. No rigidity or tenderness.   Lymphadenopathy:      Cervical: No cervical adenopathy.   Neurological:      Mental Status: She is alert.   Psychiatric:         Mood and Affect: Mood normal.         Behavior: Behavior normal.       CURRENT MEDICATIONS     Current Outpatient Medications:     acetaminophen (TYLENOL) 500 mg tablet, Take 500 mg by mouth if needed for mild pain (Patient not taking: Reported on 11/20/2023), Disp: , Rfl:     meclizine (ANTIVERT) 12.5 MG tablet, Take 1 tablet (12.5 mg total) by mouth 3 (three) times a day as needed for dizziness, Disp: 30 tablet, Rfl: 0    PAST MEDICAL & SURGICAL HISTORY     Past Medical History:   Diagnosis Date    Anemia     Back pain, acute     Gastritis     GERD (gastroesophageal reflux disease)     History of transfusion 08/2021    Non-cardiac chest pain     Shortness of breath     with low hgb     Past Surgical History:   Procedure Laterality Date    EGD      ENDOMETRIAL ABLATION N/A 1/21/2022    Procedure: HYSTEROSCOPY ABLATION ENDOMETRIAL NOVASURE;  Surgeon: Abad Ragsdale MD;  [facility];  Service: Gynecology    HI HYSTEROSCOPY BX ENDOMETRIUM&/POLYPC W/WO D&C N/A 1/21/2022    Procedure: DILATATION AND CURETTAGE (D&C) WITH HYSTEROSCOPY;  Surgeon: Abad Ragsdale MD;  [facility];  Service: Gynecology    TUBAL LIGATION       SOCIAL & FAMILY HISTORY     Social History     Socioeconomic History    Marital status: Single     Spouse name: Not on file    Number of children: Not on file    Years of education: Not on file    Highest education level: Not on file   Occupational History    Not on file    Tobacco Use    Smoking status: Never    Smokeless tobacco: Never   Vaping Use    Vaping status: Never Used   Substance and Sexual Activity    Alcohol use: Never    Drug use: Never    Sexual activity: Yes     Partners: Male     Birth control/protection: Female Sterilization   Other Topics Concern    Not on file   Social History Narrative    Not on file     Social Determinants of Health     Financial Resource Strain: Low Risk  (4/9/2024)    Overall Financial Resource Strain (CARDIA)     Difficulty of Paying Living Expenses: Not hard at all   Food Insecurity: No Food Insecurity (4/9/2024)    Hunger Vital Sign     Worried About Running Out of Food in the Last Year: Never true     Ran Out of Food in the Last Year: Never true   Transportation Needs: No Transportation Needs (4/9/2024)    PRAPARE - Transportation     Lack of Transportation (Medical): No     Lack of Transportation (Non-Medical): No   Physical Activity: Inactive (7/14/2021)    Exercise Vital Sign     Days of Exercise per Week: 0 days     Minutes of Exercise per Session: 0 min   Stress: No Stress Concern Present (7/14/2021)    [employer] of Occupational Health - Occupational Stress Questionnaire     Feeling of Stress : Not at all   Social Connections: Socially Isolated (7/14/2021)    Social Connection and Isolation Panel [NHANES]     Frequency of Communication with Friends and Family: More than three times a week     Frequency of Social Gatherings with Friends and Family: More than three times a week     Attends Gnosticist Services: Never     Active Member of Clubs or Organizations: No     Attends Club or Organization Meetings: Never     Marital Status: Never    Intimate Partner Violence: Not At Risk (7/14/2021)    Humiliation, Afraid, Rape, and Kick questionnaire     Fear of Current or Ex-Partner: No     Emotionally Abused: No     Physically Abused: No     Sexually Abused: No   Housing Stability: Low Risk  (4/9/2024)    Housing Stability Vital  Sign     Unable to Pay for Housing in the Last Year: No     Number of Places Lived in the Last Year: 1     Unstable Housing in the Last Year: No     Social History     Substance and Sexual Activity   Alcohol Use Never       Social History     Substance and Sexual Activity   Drug Use Never     Social History     Tobacco Use   Smoking Status Never   Smokeless Tobacco Never     Family History   Problem Relation Age of Onset    Hypertension Mother     Stroke Mother     No Known Problems Father     Hypertension Maternal Grandmother     Stroke Maternal Grandmother     Hypertension Maternal Uncle     Stroke Maternal Uncle     Stroke Paternal Aunt     No Known Problems Sister     HIV Brother     No Known Problems Daughter     No Known Problems Son     No Known Problems Maternal Grandfather     No Known Problems Paternal Grandmother     No Known Problems Paternal Grandfather     No Known Problems Sister     No Known Problems Daughter          ==  Ali Faisal, DO  [facility] Internal Medicine Residency, PGY-III    [address], [address]  [place] [address]  Office: [phone]  Fax: [phone]     ====================================  PLEASE NOTE:  This encounter was completed utilizing the Dragon [employer] One Voice Recognition Software. Grammatical errors, random word insertions, pronoun errors and incomplete sentences are occasional consequences of the system due to software limitations, ambient noise and hardware issues.These may be missed by proof reading prior to affixing electronic signature. Any questions or concerns about the content, text or information contained within the body of this dictation should be directly addressed to the physician for clarification. Please do not hesitate to call me directly if you have any any questions or concerns.

## 2024-04-16 NOTE — TELEPHONE ENCOUNTER
Spoke with Radha with the [facility] at . Introduced self and role. Radha stated she was able to reach out to the state and received the answers that she needed.     No other questions/concerns noted.

## 2024-04-16 NOTE — TELEPHONE ENCOUNTER
Received call from Radha with [facility] at . 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 We have to track that for the Department of Health.     Please review and advise.

## 2024-04-16 NOTE — TELEPHONE ENCOUNTER
I do not believe she has active West Nile virus but she may have had exposure in the past based on her labs. I will re-evaluate her at today's visit

## 2024-06-11 DIAGNOSIS — D50.9 IRON DEFICIENCY ANEMIA, UNSPECIFIED IRON DEFICIENCY ANEMIA TYPE: ICD-10-CM

## 2024-06-11 DIAGNOSIS — N92.0 MENORRHAGIA WITH REGULAR CYCLE: Primary | ICD-10-CM

## 2024-06-11 RX ORDER — SODIUM CHLORIDE 9 MG/ML
20 INJECTION, SOLUTION INTRAVENOUS ONCE
OUTPATIENT
Start: 2024-06-14

## 2024-08-26 ENCOUNTER — TELEPHONE (OUTPATIENT)
Dept: HEMATOLOGY ONCOLOGY | Facility: CLINIC | Age: 49
End: 2024-08-26

## 2024-08-27 ENCOUNTER — OFFICE VISIT (OUTPATIENT)
Dept: INTERNAL MEDICINE CLINIC | Facility: CLINIC | Age: 49
End: 2024-08-27

## 2024-08-27 VITALS
DIASTOLIC BLOOD PRESSURE: 81 MMHG | BODY MASS INDEX: 31.44 KG/M2 | HEIGHT: 64 IN | WEIGHT: 184.13 LBS | SYSTOLIC BLOOD PRESSURE: 144 MMHG | OXYGEN SATURATION: 97 % | TEMPERATURE: 98.3 F | HEART RATE: 89 BPM

## 2024-08-27 DIAGNOSIS — R53.83 FATIGUE, UNSPECIFIED TYPE: ICD-10-CM

## 2024-08-27 DIAGNOSIS — R07.89 CHEST PRESSURE: ICD-10-CM

## 2024-08-27 DIAGNOSIS — I20.89 STABLE ANGINA: Primary | ICD-10-CM

## 2024-08-27 DIAGNOSIS — D50.9 IRON DEFICIENCY ANEMIA, UNSPECIFIED IRON DEFICIENCY ANEMIA TYPE: ICD-10-CM

## 2024-08-27 DIAGNOSIS — K21.9 GASTROESOPHAGEAL REFLUX DISEASE, UNSPECIFIED WHETHER ESOPHAGITIS PRESENT: ICD-10-CM

## 2024-08-27 PROCEDURE — 99212 OFFICE O/P EST SF 10 MIN: CPT | Performed by: STUDENT IN AN ORGANIZED HEALTH CARE EDUCATION/TRAINING PROGRAM

## 2024-08-27 RX ORDER — FAMOTIDINE 20 MG/1
20 TABLET, FILM COATED ORAL
Qty: 30 TABLET | Refills: 2 | Status: SHIPPED | OUTPATIENT
Start: 2024-08-27 | End: 2024-11-25

## 2024-08-27 NOTE — PROGRESS NOTES
"Ambulatory Visit  Name: Jade Donald      : 1975      MRN: [id number]  Encounter Provider: Luiz Venegas MD  Encounter Date: 2024   Encounter department: [facility] ODETTE Donald is a 49 year woman w/ PMH of stable angina, Schatzki's ring, and iron deficiency anemia who presents with chest pressure.     Assessment & Plan   1. Stable angina  - History of stable angina with negative previous exercise stress test and stress ECHO  - Atypical but stable   - POC EKG showed no acute ischemia, significant for low voltage across all leads   - Current chest pain possibly cardiac in origin: ddx of pericarditis vs GERD   - Plan:  -     See \"5. chest pressure\"    2. Gastroesophageal reflux disease, unspecified whether esophagitis present  - Symptomatic and poorly controlled  - Plan:  -     H. pylori antigen, stool; Future  -     famotidine (PEPCID) 20 mg tablet; Take 1 tablet (20 mg total) by mouth daily at bedtime    3. Iron deficiency anemia, unspecified iron deficiency anemia type  - History of DYLAN w/ previous baseine Hb of  13.6 (2024)  - Plan:  -     CBC and differential; Future    4. Chest pressure  - History of stable angina with negative previous exercise stress test and stress ECHO  - POC EKG showed no acute ischemia, significant for low voltage across all leads   - Current chest pain possibly cardiac in origin: ddx of GERD vs pericarditis vs cardiac amyloidosis   - Plan:  - H. pylori antigen, stool ordered  - famotidine (PEPCID) 20 mg tablet; Take 1 tablet (20 mg total) by mouth daily at bedtime and assess symptoms after  - CRP and ESR ordered as they were previously elevated; monitor trend  - TSH + free T4 ordered to assess potential effect on palpitations   - If pepcid doesn't improve symptoms, consider trial of NSAIDs to treat a pericarditis     BMI Counseling: Body mass index is 31.6 kg/m². The BMI is above normal. Nutrition recommendations include " "encouraging healthy choices of fruits and vegetables. No pharmacotherapy was ordered. Rationale for BMI follow-up plan is due to patient being overweight or obese.     Depression Screening and Follow-up Plan: Patient was screened for depression during today's encounter. They screened negative with a PHQ-2 score of 0.      History of Present Illness     Fatigue  Associated symptoms include fatigue.     Jade Donald is a 49 year woman w/ PMH of stable angina, Schatzki's ring, and iron deficiency anemia who presents with chest pressure.     She describes a chest pressure sensation that's been occurring for the past three to four weeks. It lasts for moments and does worsen with activity but she also mentions having it at rest. She mentions burning in her chest after eating and a bad taste in her mouth upon waking up. She mentions these symptoms are worse when lying down and improved with sitting up. She also describes associated back pain, shortness of breath with exertion, and lightheadedness. She doesn't smoke, drink alcohol, or use other drugs. She has a family history on her mother's side of numerous heart attacks and strokes in grandmas, aunts, and sisters.     She describes no history of associated nausea, vomiting, cough, fevers, chills, or abdominal pain. She notes no travel history at all - she's always lived in [place]. She also reports describes no night sweats, or recent weight loss.     Objective     /81 (BP Location: Right arm, Patient Position: Sitting, Cuff Size: Standard)   Pulse 89   Temp 98.3 °F (36.8 °C) (Temporal)   Ht 5' 4\" (1.626 m)   Wt 83.5 kg (184 lb 2 oz)   SpO2 97%   BMI 31.60 kg/m²     Physical Exam  Constitutional:       General: She is not in acute distress.     Appearance: She is not ill-appearing or toxic-appearing.   HENT:      Head: Normocephalic and atraumatic.   Eyes:      Comments: Pallor bilaterally   Cardiovascular:      Rate and Rhythm: Normal rate and regular " rhythm.      Pulses: Normal pulses.      Heart sounds: Normal heart sounds.   Pulmonary:      Effort: Pulmonary effort is normal. No respiratory distress.      Breath sounds: Normal breath sounds. No stridor. No wheezing, rhonchi or rales.   Musculoskeletal:      Cervical back: Normal range of motion.       Administrative Statements       EKG read and interpreted as significant for abnormal T wave findings consistent with last EKG. Overall low voltage amplitudes across all leads.

## 2024-08-28 ENCOUNTER — APPOINTMENT (OUTPATIENT)
Dept: LAB | Facility: CLINIC | Age: 49
End: 2024-08-28

## 2024-08-28 DIAGNOSIS — N92.0 MENORRHAGIA WITH REGULAR CYCLE: ICD-10-CM

## 2024-08-28 DIAGNOSIS — K21.9 GASTROESOPHAGEAL REFLUX DISEASE, UNSPECIFIED WHETHER ESOPHAGITIS PRESENT: ICD-10-CM

## 2024-08-28 DIAGNOSIS — E53.8 VITAMIN B 12 DEFICIENCY: ICD-10-CM

## 2024-08-28 DIAGNOSIS — D50.9 IRON DEFICIENCY ANEMIA, UNSPECIFIED IRON DEFICIENCY ANEMIA TYPE: ICD-10-CM

## 2024-08-28 DIAGNOSIS — D50.0 IRON DEFICIENCY ANEMIA DUE TO CHRONIC BLOOD LOSS: ICD-10-CM

## 2024-08-28 DIAGNOSIS — R07.89 CHEST PRESSURE: ICD-10-CM

## 2024-08-28 LAB
BASOPHILS # BLD AUTO: 0.04 THOUSANDS/ÂΜL (ref 0–0.1)
BASOPHILS NFR BLD AUTO: 1 % (ref 0–1)
EOSINOPHIL # BLD AUTO: 0.2 THOUSAND/ÂΜL (ref 0–0.61)
EOSINOPHIL NFR BLD AUTO: 3 % (ref 0–6)
ERYTHROCYTE [DISTWIDTH] IN BLOOD BY AUTOMATED COUNT: 13.4 % (ref 11.6–15.1)
FERRITIN SERPL-MCNC: 5 NG/ML (ref 11–307)
HCT VFR BLD AUTO: 35.6 % (ref 34.8–46.1)
HGB BLD-MCNC: 11.7 G/DL (ref 11.5–15.4)
IMM GRANULOCYTES # BLD AUTO: 0.01 THOUSAND/UL (ref 0–0.2)
IMM GRANULOCYTES NFR BLD AUTO: 0 % (ref 0–2)
IRON SATN MFR SERPL: 6 % (ref 15–50)
IRON SERPL-MCNC: 21 UG/DL (ref 50–212)
LYMPHOCYTES # BLD AUTO: 2.01 THOUSANDS/ÂΜL (ref 0.6–4.47)
LYMPHOCYTES NFR BLD AUTO: 26 % (ref 14–44)
MCH RBC QN AUTO: 26.8 PG (ref 26.8–34.3)
MCHC RBC AUTO-ENTMCNC: 32.9 G/DL (ref 31.4–37.4)
MCV RBC AUTO: 82 FL (ref 82–98)
MONOCYTES # BLD AUTO: 0.61 THOUSAND/ÂΜL (ref 0.17–1.22)
MONOCYTES NFR BLD AUTO: 8 % (ref 4–12)
NEUTROPHILS # BLD AUTO: 4.88 THOUSANDS/ÂΜL (ref 1.85–7.62)
NEUTS SEG NFR BLD AUTO: 62 % (ref 43–75)
NRBC BLD AUTO-RTO: 0 /100 WBCS
PLATELET # BLD AUTO: 352 THOUSANDS/UL (ref 149–390)
PMV BLD AUTO: 10.1 FL (ref 8.9–12.7)
RBC # BLD AUTO: 4.36 MILLION/UL (ref 3.81–5.12)
TIBC SERPL-MCNC: 340 UG/DL (ref 250–450)
UIBC SERPL-MCNC: 319 UG/DL (ref 155–355)
WBC # BLD AUTO: 7.75 THOUSAND/UL (ref 4.31–10.16)

## 2024-08-28 PROCEDURE — 85025 COMPLETE CBC W/AUTO DIFF WBC: CPT

## 2024-08-28 PROCEDURE — 36415 COLL VENOUS BLD VENIPUNCTURE: CPT

## 2024-08-28 PROCEDURE — 83550 IRON BINDING TEST: CPT

## 2024-08-28 PROCEDURE — 83540 ASSAY OF IRON: CPT

## 2024-08-28 PROCEDURE — 82728 ASSAY OF FERRITIN: CPT

## 2024-08-29 ENCOUNTER — TELEPHONE (OUTPATIENT)
Dept: HEMATOLOGY ONCOLOGY | Facility: CLINIC | Age: 49
End: 2024-08-29

## 2024-08-29 NOTE — TELEPHONE ENCOUNTER
Called PT for No show appt with [facility], PT completed labs but did not make it to the appt because she has no insurance coverage and did not want to create a larger bill, I assured her her we care for her health and referred her the name and number of our Financial counselor so she can seek Financial assistance.  She will call us and set up appt after she speaks with the financial counselor.

## 2024-08-30 PROCEDURE — 93000 ELECTROCARDIOGRAM COMPLETE: CPT | Performed by: STUDENT IN AN ORGANIZED HEALTH CARE EDUCATION/TRAINING PROGRAM

## 2024-09-04 ENCOUNTER — PATIENT MESSAGE (OUTPATIENT)
Dept: INTERNAL MEDICINE CLINIC | Age: 49
End: 2024-09-04

## 2024-10-22 ENCOUNTER — TELEPHONE (OUTPATIENT)
Dept: INTERNAL MEDICINE CLINIC | Facility: CLINIC | Age: 49
End: 2024-10-22

## 2025-01-03 ENCOUNTER — OFFICE VISIT (OUTPATIENT)
Dept: OBGYN CLINIC | Facility: CLINIC | Age: 50
End: 2025-01-03

## 2025-01-03 VITALS
HEART RATE: 74 BPM | BODY MASS INDEX: 29.88 KG/M2 | DIASTOLIC BLOOD PRESSURE: 84 MMHG | WEIGHT: 175 LBS | SYSTOLIC BLOOD PRESSURE: 148 MMHG | HEIGHT: 64 IN

## 2025-01-03 DIAGNOSIS — Z11.3 SCREEN FOR STD (SEXUALLY TRANSMITTED DISEASE): Primary | ICD-10-CM

## 2025-01-03 DIAGNOSIS — N76.0 BV (BACTERIAL VAGINOSIS): ICD-10-CM

## 2025-01-03 DIAGNOSIS — B96.89 BV (BACTERIAL VAGINOSIS): ICD-10-CM

## 2025-01-03 DIAGNOSIS — B37.31 VULVOVAGINAL CANDIDIASIS: ICD-10-CM

## 2025-01-03 LAB
BV WHIFF TEST VAG QL: POSITIVE
CLUE CELLS SPEC QL WET PREP: POSITIVE
PH SMN: 5.5 [PH]
SL AMB POCT WET MOUNT: ABNORMAL
T VAGINALIS VAG QL WET PREP: NEGATIVE
YEAST VAG QL WET PREP: POSITIVE

## 2025-01-03 PROCEDURE — 87491 CHLMYD TRACH DNA AMP PROBE: CPT | Performed by: NURSE PRACTITIONER

## 2025-01-03 PROCEDURE — 87591 N.GONORRHOEAE DNA AMP PROB: CPT | Performed by: NURSE PRACTITIONER

## 2025-01-03 PROCEDURE — 87210 SMEAR WET MOUNT SALINE/INK: CPT | Performed by: NURSE PRACTITIONER

## 2025-01-03 PROCEDURE — 99213 OFFICE O/P EST LOW 20 MIN: CPT | Performed by: NURSE PRACTITIONER

## 2025-01-03 RX ORDER — FLUCONAZOLE 150 MG/1
150 TABLET ORAL
Qty: 2 TABLET | Refills: 0 | Status: SHIPPED | OUTPATIENT
Start: 2025-01-03 | End: 2025-01-11

## 2025-01-03 RX ORDER — METRONIDAZOLE 500 MG/1
500 TABLET ORAL 2 TIMES DAILY
Qty: 14 TABLET | Refills: 0 | Status: SHIPPED | OUTPATIENT
Start: 2025-01-03 | End: 2025-01-10

## 2025-01-03 NOTE — PROGRESS NOTES
PROBLEM GYNECOLOGICAL VISIT    Jade Donald is a 49 y.o. female who presents today with complaint of vaginitis.  Her general medical history has been reviewed and she reports it as follows:    Past Medical History:   Diagnosis Date    Anemia     Back pain, acute     Gastritis     GERD (gastroesophageal reflux disease)     History of transfusion 2021    Non-cardiac chest pain     Shortness of breath     with low hgb     Past Surgical History:   Procedure Laterality Date    EGD      ENDOMETRIAL ABLATION N/A 2022    Procedure: HYSTEROSCOPY ABLATION ENDOMETRIAL NOVASURE;  Surgeon: Abad Ragsdale MD;  [facility];  Service: Gynecology    CO HYSTEROSCOPY BX ENDOMETRIUM&/POLYPC W/WO D&C N/A 2022    Procedure: DILATATION AND CURETTAGE (D&C) WITH HYSTEROSCOPY;  Surgeon: Abad Ragsdale MD;  Location: [facility];  Service: Gynecology    TUBAL LIGATION       OB History          3    Para   3    Term   3            AB        Living   3         SAB        IAB        Ectopic        Multiple        Live Births                   Social History     Tobacco Use    Smoking status: Never    Smokeless tobacco: Never   Vaping Use    Vaping status: Never Used   Substance Use Topics    Alcohol use: Never    Drug use: Never     Social History     Substance and Sexual Activity   Sexual Activity Yes    Partners: Male    Birth control/protection: Female Sterilization       Current Outpatient Medications   Medication Instructions    acetaminophen (TYLENOL) 500 mg, As needed    famotidine (PEPCID) 20 mg, Oral, Daily at bedtime    fluconazole (DIFLUCAN) 150 mg, Oral, Every 7 days    meclizine (ANTIVERT) 12.5 mg, Oral, 3 times daily PRN    metroNIDAZOLE (FLAGYL) 500 mg, Oral, 2 times daily       History of Present Illness:   Jade presents today with a suspected vaginal infection. Symptoms initially started about 3 weeks ago but have become much worse this week. She has been experiencing a large  "amount of yellow discharge with a foul odor. She is also having intense vaginal/vulvar burning. She denies any new hygiene products. No new sexual partners though did just discover that her  has been unfaithful.     Review of Systems:  Review of Systems   Constitutional: Negative.    Gastrointestinal: Negative.    Genitourinary:  Positive for vaginal discharge and vaginal pain.       Physical Exam:  /84 (BP Location: Right arm, Patient Position: Sitting)   Pulse 74   Ht 5' 4\" (1.626 m)   Wt 79.4 kg (175 lb)   LMP 10/02/2024 (Approximate)   BMI 30.04 kg/m²   Physical Exam  Constitutional:       General: She is not in acute distress.     Appearance: Normal appearance.   Genitourinary:      Vulva normal.      No lesions in the vagina.      Vaginal discharge, erythema and tenderness present.      No vaginal ulceration.        Right Adnexa: not tender.     Left Adnexa: not tender.     No cervical motion tenderness or lesion.   Neurological:      Mental Status: She is alert.   Skin:     General: Skin is warm and dry.   Psychiatric:         Mood and Affect: Mood normal.         Behavior: Behavior normal.   Vitals reviewed.         Point of Care Testing:   -Wet mount: +clue cells, +yeast, -trich    -KOH mount: +yeast   -Whiff: positive    -ph 5.5     Assessment:   1. Bacterial vaginosis    2. Vulvovaginal candidiasis    3. STI screening     Plan:   1. Rx for Diflucan and flagyl    2. GC/CT culture collected.    3. Orders placed for serum HIV, hep C and syphilis screening.    4. Patient's depression screening was assessed with a PHQ-2 score of 0. Clinically patient does not have depression. No treatment is required.    5. Return for annual exam     Reviewed with patient that [facility], but that they will not necessarily be reviewed by me immediately.  Explained that I will review results at my earliest opportunity and contact patient appropriately.  "

## 2025-01-06 LAB
C TRACH DNA SPEC QL NAA+PROBE: NEGATIVE
N GONORRHOEA DNA SPEC QL NAA+PROBE: NEGATIVE

## 2025-01-07 ENCOUNTER — RESULTS FOLLOW-UP (OUTPATIENT)
Dept: OBGYN CLINIC | Facility: CLINIC | Age: 50
End: 2025-01-07

## 2025-03-05 ENCOUNTER — DOCUMENTATION (OUTPATIENT)
Dept: HEMATOLOGY ONCOLOGY | Facility: CLINIC | Age: 50
End: 2025-03-05

## 2025-03-05 NOTE — PROGRESS NOTES
PT's AR Assist, Venofer [facility] has been withdrawn due to the 12 month enrollment expiration.       Mary Luke MPH  Phone: [phone]  Email: [email or web address]

## 2025-05-27 ENCOUNTER — HOSPITAL ENCOUNTER (EMERGENCY)
Facility: HOSPITAL | Age: 50
Discharge: HOME | End: 2025-05-27
Attending: EMERGENCY MEDICINE | Admitting: EMERGENCY MEDICINE
Payer: COMMERCIAL

## 2025-05-27 ENCOUNTER — APPOINTMENT (EMERGENCY)
Dept: RADIOLOGY | Facility: HOSPITAL | Age: 50
End: 2025-05-27
Payer: COMMERCIAL

## 2025-05-27 VITALS
TEMPERATURE: 98 F | SYSTOLIC BLOOD PRESSURE: 158 MMHG | RESPIRATION RATE: 16 BRPM | DIASTOLIC BLOOD PRESSURE: 78 MMHG | OXYGEN SATURATION: 97 % | HEART RATE: 74 BPM

## 2025-05-27 DIAGNOSIS — Z75.8 DOES NOT HAVE PRIMARY CARE PROVIDER: ICD-10-CM

## 2025-05-27 DIAGNOSIS — D64.9 SYMPTOMATIC ANEMIA: Primary | ICD-10-CM

## 2025-05-27 DIAGNOSIS — N92.0 MENORRHAGIA: ICD-10-CM

## 2025-05-27 LAB
ALBUMIN SERPL BCG-MCNC: 4.2 G/DL (ref 3.5–5)
ALP SERPL-CCNC: 69 U/L (ref 34–104)
ALT SERPL W P-5'-P-CCNC: 11 U/L (ref 7–52)
ANION GAP SERPL CALCULATED.3IONS-SCNC: 8 MMOL/L (ref 4–13)
AST SERPL W P-5'-P-CCNC: 17 U/L (ref 13–39)
BASOPHILS # BLD AUTO: 0.04 THOUSANDS/ÂΜL (ref 0–0.1)
BASOPHILS NFR BLD AUTO: 1 % (ref 0–1)
BILIRUB SERPL-MCNC: 0.37 MG/DL (ref 0.2–1)
BUN SERPL-MCNC: 9 MG/DL (ref 5–25)
CALCIUM SERPL-MCNC: 9 MG/DL (ref 8.4–10.2)
CARDIAC TROPONIN I PNL SERPL HS: <2 NG/L (ref ?–50)
CHLORIDE SERPL-SCNC: 105 MMOL/L (ref 96–108)
CO2 SERPL-SCNC: 26 MMOL/L (ref 21–32)
CREAT SERPL-MCNC: 0.53 MG/DL (ref 0.6–1.3)
EOSINOPHIL # BLD AUTO: 0.3 THOUSAND/ÂΜL (ref 0–0.61)
EOSINOPHIL NFR BLD AUTO: 6 % (ref 0–6)
ERYTHROCYTE [DISTWIDTH] IN BLOOD BY AUTOMATED COUNT: 20 % (ref 11.6–15.1)
EXT PREGNANCY TEST URINE: NEGATIVE
EXT. CONTROL: NORMAL
GFR SERPL CREATININE-BSD FRML MDRD: 111 ML/MIN/1.73SQ M
GLUCOSE SERPL-MCNC: 104 MG/DL (ref 65–140)
HCT VFR BLD AUTO: 33.9 % (ref 34.8–46.1)
HGB BLD-MCNC: 9.5 G/DL (ref 11.5–15.4)
IMM GRANULOCYTES # BLD AUTO: 0.01 THOUSAND/UL (ref 0–0.2)
IMM GRANULOCYTES NFR BLD AUTO: 0 % (ref 0–2)
LYMPHOCYTES # BLD AUTO: 1.46 THOUSANDS/ÂΜL (ref 0.6–4.47)
LYMPHOCYTES NFR BLD AUTO: 28 % (ref 14–44)
MCH RBC QN AUTO: 20.4 PG (ref 26.8–34.3)
MCHC RBC AUTO-ENTMCNC: 28 G/DL (ref 31.4–37.4)
MCV RBC AUTO: 73 FL (ref 82–98)
MONOCYTES # BLD AUTO: 0.46 THOUSAND/ÂΜL (ref 0.17–1.22)
MONOCYTES NFR BLD AUTO: 9 % (ref 4–12)
NEUTROPHILS # BLD AUTO: 2.9 THOUSANDS/ÂΜL (ref 1.85–7.62)
NEUTS SEG NFR BLD AUTO: 56 % (ref 43–75)
NRBC BLD AUTO-RTO: 0 /100 WBCS
PLATELET # BLD AUTO: 343 THOUSANDS/UL (ref 149–390)
PMV BLD AUTO: 9.2 FL (ref 8.9–12.7)
POTASSIUM SERPL-SCNC: 3.4 MMOL/L (ref 3.5–5.3)
PROT SERPL-MCNC: 7 G/DL (ref 6.4–8.4)
RBC # BLD AUTO: 4.65 MILLION/UL (ref 3.81–5.12)
SODIUM SERPL-SCNC: 139 MMOL/L (ref 135–147)
WBC # BLD AUTO: 5.17 THOUSAND/UL (ref 4.31–10.16)

## 2025-05-27 PROCEDURE — 99285 EMERGENCY DEPT VISIT HI MDM: CPT

## 2025-05-27 PROCEDURE — 99285 EMERGENCY DEPT VISIT HI MDM: CPT | Performed by: EMERGENCY MEDICINE

## 2025-05-27 PROCEDURE — 71046 X-RAY EXAM CHEST 2 VIEWS: CPT

## 2025-05-27 PROCEDURE — 93005 ELECTROCARDIOGRAM TRACING: CPT

## 2025-05-27 PROCEDURE — 85025 COMPLETE CBC W/AUTO DIFF WBC: CPT

## 2025-05-27 PROCEDURE — 84484 ASSAY OF TROPONIN QUANT: CPT

## 2025-05-27 PROCEDURE — 36415 COLL VENOUS BLD VENIPUNCTURE: CPT

## 2025-05-27 PROCEDURE — 80053 COMPREHEN METABOLIC PANEL: CPT

## 2025-05-27 PROCEDURE — 81025 URINE PREGNANCY TEST: CPT

## 2025-05-27 NOTE — ED PROVIDER NOTES
Time reflects when diagnosis was documented in both MDM as applicable and the Disposition within this note       Time User Action Codes Description Comment    5/27/2025 12:18 PM Shantel Ornelas Add [Z75.8] Does not have primary care provider     5/27/2025  1:42 PM Shantel Ornelas Add [D64.9] Symptomatic anemia     5/27/2025  1:43 PM JohnsonShantel kelly Add [N92.0] Menorrhagia     5/27/2025  1:43 PM JohnsonShantel kelly Modify [Z75.8] Does not have primary care provider     5/27/2025  1:43 PM JohnsonShantel kelly Modify [D64.9] Symptomatic anemia           ED Disposition       ED Disposition   Discharge    Condition   Stable    Date/Time   Tue May 27, 2025  1:41 PM    Comment   Jade Donald discharge to home/self care.                   Assessment & Plan       Medical Decision Making  Amount and/or Complexity of Data Reviewed  Labs: ordered.  Radiology: independent interpretation performed.        Patient is a 50 y.o. female  PMH iron deficiency anemia, GERD, gastritis, AUB s/p endometrial ablation who presents to the ED with CC dizziness, and shortness of breath for the last few weeks, worsening over the last two days. Denies any chest pain to me though I do see it was in the triage note. Has had similar symptoms in the past which were related to her hemoglobin. Describes her symptoms as fatigue and like the room is spinning around her. Says when she is just sitting she has a sensation of this dizziness. When she is active, she has chest tightness with shortness of breath. No specific left sided chest pain. Endorses these symptoms, including the dizziness, are the exact same as the last time she had low hemoglobin.  Has had some heavy vaginal bleeding over the last week and is, currently menstruating. Labs drawn in triage show hemoglobin 9.5 from 11.7 previously. States for the first three days of her period she was changing a saturated regular sized pad (not a maxi pad) three times an hour, but this has been slowing over the  "last two days. Today, she has minimal bleeding and is yet to fill a pad.     Lately her period has been \"regular\" in the sense that it has been occurring every three months. Has hx of endometrial ablation for this heavy bleeding. Follows with OBGYN and take iron supplements.     Vital signs stable, normotensive, not tachycardic. Exam as listed below.    Differential diagnosis includes but is not limited to symptomatic anemia due to abnormal uterine bleeding, ACS, dehydration, dysrhythmia, electrolyte derangement     Plan BC, CMP, troponin, EKG, chest x-ray, urine pregnancy.    View ED course above for further discussion on patient workup.     All labs reviewed and utilized in the medical decision making process  All radiology studies independently viewed by me and interpreted by the radiologist.  I reviewed all testing with the patient.     Upon re-evaluation hemoglobin is 9.5.  Discussed patient could benefit from transfusion given she is symptomatic and that hemoglobins less than 7 necessitate transfusion.  Patient states however that her bleeding has almost completely stopped over the course the last 2 days.  She would prefer instead to see if symptoms improve, and his bleeding has slowed down.  Discussed strict return precautions for persistent bleeding with worsening of her current symptoms or persistence of her symptoms.  Patient expresses understanding.  Also confirmed plan for her to follow-up with OB/GYN this week.    Medications - No data to display    ED Risk Strat Scores   HEART Risk Score      Flowsheet Row Most Recent Value   Heart Score Risk Calculator    History 0 Filed at: 05/27/2025 1938   ECG 0 Filed at: 05/27/2025 1938   Age 1 Filed at: 05/27/2025 1938   Risk Factors 1 Filed at: 05/27/2025 1938   Troponin 0 Filed at: 05/27/2025 1938   HEART Score 2 Filed at: 05/27/2025 1938          HEART Risk Score      Flowsheet Row Most Recent Value   Heart Score Risk Calculator    History 0 Filed at: " 05/27/2025 1938   ECG 0 Filed at: 05/27/2025 1938   Age 1 Filed at: 05/27/2025 1938   Risk Factors 1 Filed at: 05/27/2025 1938   Troponin 0 Filed at: 05/27/2025 1938   HEART Score 2 Filed at: 05/27/2025 1938                      No data recorded        SBIRT 22yo+      Flowsheet Row Most Recent Value   Initial Alcohol Screen: US AUDIT-C     1. How often do you have a drink containing alcohol? 0 Filed at: 05/27/2025 1335   2. How many drinks containing alcohol do you have on a typical day you are drinking?  0 Filed at: 05/27/2025 1335   3a. Male UNDER 65: How often do you have five or more drinks on one occasion? 0 Filed at: 05/27/2025 1335   3b. FEMALE Any Age, or MALE 65+: How often do you have 4 or more drinks on one occassion? 0 Filed at: 05/27/2025 1335   Audit-C Score 0 Filed at: 05/27/2025 1335   OSCAR: How many times in the past year have you...    Used an illegal drug or used a prescription medication for non-medical reasons? Never Filed at: 05/27/2025 1335                            History of Present Illness       Chief Complaint   Patient presents with    Chest Pain     C/o CP, SOB and dizziness that started a couple of weeks ago. Increasingly worse today. Denies n/v/d       Past Medical History[1]   Past Surgical History[2]   Family History[3]   Social History[4]   E-Cigarette/Vaping    E-Cigarette Use Never User       E-Cigarette/Vaping Substances    Nicotine No     THC No     CBD No     Flavoring No     Other No     Unknown No       I have reviewed and agree with the history as documented.     50-year-old female chief complaint  dizziness and shortness of breath        Review of Systems   Constitutional:  Negative for appetite change, chills and fever.   HENT:  Negative for congestion and rhinorrhea.    Respiratory:  Negative for cough and shortness of breath.    Cardiovascular:  Negative for chest pain and palpitations.   Gastrointestinal:  Negative for abdominal distention, abdominal pain, nausea and  vomiting.   Genitourinary:  Positive for vaginal bleeding. Negative for flank pain and pelvic pain.   Skin:  Negative for color change and rash.   Neurological:  Positive for dizziness, weakness and light-headedness. Negative for syncope, numbness and headaches.   Psychiatric/Behavioral:  Negative for confusion.            Objective       ED Triage Vitals   Temperature Pulse Blood Pressure Respirations SpO2 Patient Position - Orthostatic VS   05/27/25 1025 05/27/25 1025 05/27/25 1025 05/27/25 1025 05/27/25 1025 --   98 °F (36.7 °C) 79 (!) 184/88 18 100 %       Temp Source Heart Rate Source BP Location FiO2 (%) Pain Score    05/27/25 1025 05/27/25 1300 -- -- --    Temporal Monitor         Vitals      Date and Time Temp Pulse SpO2 Resp BP Pain Score FACES Pain Rating User   05/27/25 1300 -- 74 97 % 16 158/78 -- -- KM   05/27/25 1025 98 °F (36.7 °C) 79 100 % 18 184/88 -- -- CS            Physical Exam  Constitutional:       General: She is not in acute distress.     Appearance: Normal appearance. She is not ill-appearing or diaphoretic.   HENT:      Head: Normocephalic and atraumatic.      Nose: Nose normal.      Mouth/Throat:      Mouth: Mucous membranes are moist.     Eyes:      Conjunctiva/sclera: Conjunctivae normal.      Pupils: Pupils are equal, round, and reactive to light.       Cardiovascular:      Rate and Rhythm: Normal rate.      Pulses: Normal pulses.   Pulmonary:      Effort: Pulmonary effort is normal.   Abdominal:      General: Abdomen is flat. There is no distension.      Tenderness: There is no abdominal tenderness. There is no guarding.     Musculoskeletal:      Cervical back: Neck supple.     Skin:     General: Skin is warm and dry.      Capillary Refill: Capillary refill takes less than 2 seconds.      Coloration: Skin is not jaundiced or pale.      Findings: No bruising.     Neurological:      General: No focal deficit present.      Mental Status: She is alert and oriented to person, place, and  time.         Results Reviewed       Procedure Component Value Units Date/Time    POCT pregnancy, urine [[id number]]  (Normal) Collected: 05/27/25 1309    Lab Status: Final result Updated: 05/27/25 1309     EXT Preg Test, Ur Negative     Control Valid    Comprehensive metabolic panel [[id number]]  (Abnormal) Collected: 05/27/25 1028    Lab Status: Final result Specimen: Blood from Arm, Left Updated: 05/27/25 1104     Sodium 139 mmol/L      Potassium 3.4 mmol/L      Chloride 105 mmol/L      CO2 26 mmol/L      ANION GAP 8 mmol/L      BUN 9 mg/dL      Creatinine 0.53 mg/dL      Glucose 104 mg/dL      Calcium 9.0 mg/dL      AST 17 U/L      ALT 11 U/L      Alkaline Phosphatase 69 U/L      Total Protein 7.0 g/dL      Albumin 4.2 g/dL      Total Bilirubin 0.37 mg/dL      eGFR 111 ml/min/1.73sq m     Narrative:      National Kidney Disease Foundation guidelines for Chronic Kidney Disease (CKD):     Stage 1 with normal or high GFR (GFR > 90 mL/min/1.73 square meters)    Stage 2 Mild CKD (GFR = 60-89 mL/min/1.73 square meters)    Stage 3A Moderate CKD (GFR = 45-59 mL/min/1.73 square meters)    Stage 3B Moderate CKD (GFR = 30-44 mL/min/1.73 square meters)    Stage 4 Severe CKD (GFR = 15-29 mL/min/1.73 square meters)    Stage 5 End Stage CKD (GFR <15 mL/min/1.73 square meters)  Note: GFR calculation is accurate only with a steady state creatinine    HS Troponin 0hr (reflex protocol) [[id number]]  (Normal) Collected: 05/27/25 1028    Lab Status: Final result Specimen: Blood from Arm, Left Updated: 05/27/25 1057     hs TnI 0hr <2 ng/L     CBC and differential [[id number]]  (Abnormal) Collected: 05/27/25 1028    Lab Status: Final result Specimen: Blood from Arm, Left Updated: 05/27/25 1042     WBC 5.17 Thousand/uL      RBC 4.65 Million/uL      Hemoglobin 9.5 g/dL      Hematocrit 33.9 %      MCV 73 fL      MCH 20.4 pg      MCHC 28.0 g/dL      RDW 20.0 %      MPV 9.2 fL      Platelets 343 Thousands/uL      nRBC 0 /100 WBCs       Segmented % 56 %      Immature Grans % 0 %      Lymphocytes % 28 %      Monocytes % 9 %      Eosinophils Relative 6 %      Basophils Relative 1 %      Absolute Neutrophils 2.90 Thousands/µL      Absolute Immature Grans 0.01 Thousand/uL      Absolute Lymphocytes 1.46 Thousands/µL      Absolute Monocytes 0.46 Thousand/µL      Eosinophils Absolute 0.30 Thousand/µL      Basophils Absolute 0.04 Thousands/µL             XR chest 2 views   ED Interpretation by Shantel Ornelas MD (05/27 1341)   Normal CXR       Final Interpretation by Kelsie Castellanos MD (05/27 5417)   No acute cardiopulmonary disease.            Workstation performed: [id number]             Procedures    ED Medication and Procedure Management   Prior to Admission Medications   Prescriptions Last Dose Informant Patient Reported? Taking?   acetaminophen (TYLENOL) 500 mg tablet  Self Yes No   Sig: Take 500 mg by mouth if needed for mild pain   Patient not taking: Reported on 11/20/2023   famotidine (PEPCID) 20 mg tablet   No No   Sig: Take 1 tablet (20 mg total) by mouth daily at bedtime   meclizine (ANTIVERT) 12.5 MG tablet   No No   Sig: Take 1 tablet (12.5 mg total) by mouth 3 (three) times a day as needed for dizziness      Facility-Administered Medications: None     Discharge Medication List as of 5/27/2025  1:46 PM        CONTINUE these medications which have NOT CHANGED    Details   acetaminophen (TYLENOL) 500 mg tablet Take 500 mg by mouth if needed for mild pain, Historical Med      famotidine (PEPCID) 20 mg tablet Take 1 tablet (20 mg total) by mouth daily at bedtime, Starting Tue 8/27/2024, Until Mon 11/25/2024, Normal      meclizine (ANTIVERT) 12.5 MG tablet Take 1 tablet (12.5 mg total) by mouth 3 (three) times a day as needed for dizziness, Starting Mon 11/20/2023, Normal             ED SEPSIS DOCUMENTATION   Time reflects when diagnosis was documented in both MDM as applicable and the Disposition within this note       Time User Action Codes  Description Comment    5/27/2025 12:18 PM Shantel Ornelas Add [Z75.8] Does not have primary care provider     5/27/2025  1:42 PM Shantel Ornelas Add [D64.9] Symptomatic anemia     5/27/2025  1:43 PM Shantel Ornelas Add [N92.0] Menorrhagia     5/27/2025  1:43 PM Shantel Ornelas Modify [Z75.8] Does not have primary care provider     5/27/2025  1:43 PM Shantel Ornelas Modify [D64.9] Symptomatic anemia                    [1]   Past Medical History:  Diagnosis Date    Anemia     Back pain, acute     Gastritis     GERD (gastroesophageal reflux disease)     History of transfusion 08/2021    Non-cardiac chest pain     Shortness of breath     with low hgb   [2]   Past Surgical History:  Procedure Laterality Date    EGD      ENDOMETRIAL ABLATION N/A 1/21/2022    Procedure: HYSTEROSCOPY ABLATION ENDOMETRIAL NOVASURE;  Surgeon: Abad Ragsdale MD;  Location[facility];  Service: Gynecology    GA HYSTEROSCOPY BX ENDOMETRIUM&/POLYPC W/WO D&C N/A 1/21/2022    Procedure: DILATATION AND CURETTAGE (D&C) WITH HYSTEROSCOPY;  Surgeon: Abad Ragsdale MD;  Location[facility];  Service: Gynecology    TUBAL LIGATION     [3]   Family History  Problem Relation Name Age of Onset    Hypertension Mother      Stroke Mother      No Known Problems Father      Hypertension Maternal Grandmother      Stroke Maternal Grandmother      Hypertension Maternal Uncle      Stroke Maternal Uncle      Stroke Paternal Aunt      No Known Problems Sister      HIV Brother      No Known Problems Daughter      No Known Problems Son      No Known Problems Maternal Grandfather      No Known Problems Paternal Grandmother      No Known Problems Paternal Grandfather      No Known Problems Sister      No Known Problems Daughter     [4]   Social History  Tobacco Use    Smoking status: Never    Smokeless tobacco: Never   Vaping Use    Vaping status: Never Used   Substance Use Topics    Alcohol use: Never    Drug use: Never        Shantel Ornelas MD  05/27/25 1938

## 2025-05-27 NOTE — DISCHARGE INSTRUCTIONS
You were seen today for shortness and breath and dizziness. Your cardiac evaluation was normal. I believe this is from your low hemoglobin. We did discuss transfusion and ultimately decided against it as your hemoglobin is 9.5 and bleeding has slowed down significantly. Please return to the ER if you have heavy bleeding and continued symptoms. Follow up with OBGYN this week for continued management.

## 2025-05-28 LAB
ATRIAL RATE: 73 BPM
P AXIS: 19 DEGREES
PR INTERVAL: 160 MS
QRS AXIS: 92 DEGREES
QRSD INTERVAL: 82 MS
QT INTERVAL: 390 MS
QTC INTERVAL: 430 MS
T WAVE AXIS: -17 DEGREES
VENTRICULAR RATE: 73 BPM

## 2025-05-28 PROCEDURE — 93010 ELECTROCARDIOGRAM REPORT: CPT | Performed by: INTERNAL MEDICINE

## 2025-05-28 NOTE — ED ATTENDING ATTESTATION
5/27/2025  I, Dalton Donahue DO, saw and evaluated the patient. I have discussed the patient with the resident/non-physician practitioner and agree with the resident's/non-physician practitioner's findings, Plan of Care, and MDM as documented in the resident's/non-physician practitioner's note, except where noted. All available labs and Radiology studies were reviewed.  I was present for key portions of any procedure(s) performed by the resident/non-physician practitioner and I was immediately available to provide assistance.       At this point I agree with the current assessment done in the Emergency Department.  I have conducted an independent evaluation of this patient a history and physical is as follows:    Background: 50 y.o. female with chest pain likely from symptomatic anemia     Differential DX includes but is not limited to: acs/mi, pe, pleurisy, dissection, pneumonia, musculoskeletal chest pain    Plan: cardiac workup      ED Course         Critical Care Time  Procedures

## 2025-06-12 NOTE — H&P
H&P- Gynecology  Gato Callahan 55 y o  female MRN: [id number]  Unit/Bed#: OR POOL Encounter: [id number]        Assessment/Plan       * Menorrhagia with regular cycle  Assessment & Plan  To OR for endometrial ablation         History of Present Illness     Subspeciality: General GYN  HPI: Gato Callahan is a 55 y o   female who presents with regular heavy menses lasting 7 to 10 days  Patient with long history of heavy menses  A pelvic ultrasound was performed last 20 with report of anteverted uterus measuring 10 2 x 5 2 x 6 3 cm  Somewhat lobular in appearance, suggestive of adenomyosis, endometrial thickness 16 mm  No fibroids present   EMB 20 and again 21 without hyperplasia or malignancy  She desires endometrial ablation  She has a BTL for contraception  All other review of symptoms are negative         Historical Information   Past Medical History:   Diagnosis Date    Anemia     Back pain, acute     Gastritis     GERD (gastroesophageal reflux disease)     History of transfusion 2021    Non-cardiac chest pain     Shortness of breath     with low hgb     Past Surgical History:   Procedure Laterality Date    EGD      TUBAL LIGATION       OB History    Para Term  AB Living   3 3 3     3   SAB IAB Ectopic Multiple Live Births                  # Outcome Date GA Lbr Chau/2nd Weight Sex Delivery Anes PTL Lv   3 Term            2 Term            1 Term              Family History   Problem Relation Age of Onset    Hypertension Mother     Stroke Mother     No Known Problems Father     Hypertension Maternal Grandmother     Stroke Maternal Grandmother     Hypertension Maternal Uncle     Stroke Maternal Uncle     Stroke Paternal Aunt     No Known Problems Sister     HIV Brother     No Known Problems Daughter     No Known Problems Son     No Known Problems Maternal Grandfather     No Known Problems Paternal Grandmother     No Known Problems Paternal Grandfather     No Known Problems Sister     No Known Problems Daughter      Social History   Social History     Substance and Sexual Activity   Alcohol Use Never     Social History     Substance and Sexual Activity   Drug Use Never     Social History     Tobacco Use   Smoking Status Never Smoker   Smokeless Tobacco Never Used     No Known Allergies    Objective   Vitals: Blood pressure 146/89, pulse 82, temperature (!) 97 4 °F (36 3 °C), temperature source Tympanic, resp  rate 18, height 5' 6" (1 676 m), weight 77 6 kg (171 lb), last menstrual period 01/03/2022, SpO2 100 %, not currently breastfeeding  Body mass index is 27 6 kg/m²  Invasive Devices  Report    None                 Physical Exam  Vitals reviewed  Exam conducted with a chaperone present  Constitutional:       Appearance: Normal appearance  HENT:      Head: Normocephalic and atraumatic  Mouth/Throat:      Mouth: Mucous membranes are moist    Cardiovascular:      Rate and Rhythm: Normal rate and regular rhythm  Pulses: Normal pulses  Heart sounds: Normal heart sounds  Pulmonary:      Effort: Pulmonary effort is normal  No respiratory distress  Breath sounds: Normal breath sounds  Chest:      Chest wall: No mass, lacerations or swelling  Breasts: Breasts are symmetrical       Right: Normal  No bleeding, axillary adenopathy or supraclavicular adenopathy  Left: Normal  No bleeding, axillary adenopathy or supraclavicular adenopathy  Abdominal:      General: Abdomen is flat  There is no distension  Palpations: Abdomen is soft  There is no mass  Hernia: There is no hernia in the left inguinal area or right inguinal area  Genitourinary:     Exam position: Lithotomy position  Labia:         Right: No rash  Left: No rash  Urethra: No prolapse  Comments: Deferred to OR  Musculoskeletal:         General: Normal range of motion  Cervical back: Normal range of motion  Lymphadenopathy:      Upper Body:      Right upper body: No supraclavicular or axillary adenopathy  Left upper body: No supraclavicular or axillary adenopathy  Skin:     General: Skin is warm and dry  Neurological:      Mental Status: She is alert and oriented to person, place, and time     Psychiatric:         Mood and Affect: Mood normal          Behavior: Behavior normal          Lab Results:   Recent Results (from the past 24 hour(s))   Urine Pregnancy (Holding Area Only) POCT    Collection Time: 01/21/22 12:28 PM   Result Value Ref Range    EXT Preg Test, Ur Negative Negative    Control Valid Valid       Kiet Hayes DO  1/21/2022  1:45 PM 114

## (undated) DEVICE — BETHLEHEM UNIVERSAL MINOR VAG: Brand: CARDINAL HEALTH

## (undated) DEVICE — UNDER BUTTOCKS DRAPE W/FLUID CONTROL POUCH: Brand: CONVERTORS

## (undated) DEVICE — NOVASURE ADVANCED DEVICE

## (undated) DEVICE — PVC URETHRAL CATHETER: Brand: DOVER

## (undated) DEVICE — SCD SEQUENTIAL COMPRESSION COMFORT SLEEVE MEDIUM KNEE LENGTH: Brand: KENDALL SCD

## (undated) DEVICE — GLOVE PI ULTRA TOUCH SZ.7.0

## (undated) DEVICE — GLOVE INDICATOR PI UNDERGLOVE SZ 7.5 BLUE

## (undated) DEVICE — CYSTO TUBING SINGLE IRRIGATION